# Patient Record
Sex: FEMALE | Race: WHITE | NOT HISPANIC OR LATINO | URBAN - METROPOLITAN AREA
[De-identification: names, ages, dates, MRNs, and addresses within clinical notes are randomized per-mention and may not be internally consistent; named-entity substitution may affect disease eponyms.]

---

## 2017-03-10 ENCOUNTER — EMERGENCY (EMERGENCY)
Facility: HOSPITAL | Age: 82
LOS: 1 days | Discharge: HOME | End: 2017-03-10
Attending: EMERGENCY MEDICINE | Admitting: EMERGENCY MEDICINE
Payer: MEDICARE

## 2017-03-10 VITALS
TEMPERATURE: 98 F | OXYGEN SATURATION: 97 % | RESPIRATION RATE: 20 BRPM | DIASTOLIC BLOOD PRESSURE: 68 MMHG | HEART RATE: 82 BPM | SYSTOLIC BLOOD PRESSURE: 150 MMHG

## 2017-03-10 VITALS
RESPIRATION RATE: 19 BRPM | SYSTOLIC BLOOD PRESSURE: 186 MMHG | HEART RATE: 76 BPM | OXYGEN SATURATION: 98 % | DIASTOLIC BLOOD PRESSURE: 73 MMHG

## 2017-03-10 DIAGNOSIS — M54.12 RADICULOPATHY, CERVICAL REGION: ICD-10-CM

## 2017-03-10 DIAGNOSIS — I10 ESSENTIAL (PRIMARY) HYPERTENSION: ICD-10-CM

## 2017-03-10 DIAGNOSIS — Z91.018 ALLERGY TO OTHER FOODS: ICD-10-CM

## 2017-03-10 DIAGNOSIS — Z88.1 ALLERGY STATUS TO OTHER ANTIBIOTIC AGENTS STATUS: ICD-10-CM

## 2017-03-10 DIAGNOSIS — Z88.0 ALLERGY STATUS TO PENICILLIN: ICD-10-CM

## 2017-03-10 DIAGNOSIS — R51 HEADACHE: ICD-10-CM

## 2017-03-10 LAB
ALBUMIN SERPL ELPH-MCNC: 4 G/DL — SIGNIFICANT CHANGE UP (ref 3.3–5)
ALP SERPL-CCNC: 81 U/L — SIGNIFICANT CHANGE UP (ref 40–120)
ALT FLD-CCNC: 17 U/L RC — SIGNIFICANT CHANGE UP (ref 10–45)
ANION GAP SERPL CALC-SCNC: 13 MMOL/L — SIGNIFICANT CHANGE UP (ref 5–17)
APTT BLD: 31.4 SEC — SIGNIFICANT CHANGE UP (ref 27.5–37.4)
AST SERPL-CCNC: 19 U/L — SIGNIFICANT CHANGE UP (ref 10–40)
BASOPHILS # BLD AUTO: 0 K/UL — SIGNIFICANT CHANGE UP (ref 0–0.2)
BASOPHILS NFR BLD AUTO: 0.8 % — SIGNIFICANT CHANGE UP (ref 0–2)
BILIRUB SERPL-MCNC: 0.3 MG/DL — SIGNIFICANT CHANGE UP (ref 0.2–1.2)
BUN SERPL-MCNC: 16 MG/DL — SIGNIFICANT CHANGE UP (ref 7–23)
CALCIUM SERPL-MCNC: 9.3 MG/DL — SIGNIFICANT CHANGE UP (ref 8.4–10.5)
CHLORIDE SERPL-SCNC: 104 MMOL/L — SIGNIFICANT CHANGE UP (ref 96–108)
CO2 SERPL-SCNC: 27 MMOL/L — SIGNIFICANT CHANGE UP (ref 22–31)
CREAT SERPL-MCNC: 0.72 MG/DL — SIGNIFICANT CHANGE UP (ref 0.5–1.3)
EOSINOPHIL # BLD AUTO: 0.4 K/UL — SIGNIFICANT CHANGE UP (ref 0–0.5)
EOSINOPHIL NFR BLD AUTO: 6.1 % — HIGH (ref 0–6)
GLUCOSE SERPL-MCNC: 104 MG/DL — HIGH (ref 70–99)
HCT VFR BLD CALC: 44.2 % — SIGNIFICANT CHANGE UP (ref 34.5–45)
HGB BLD-MCNC: 14.3 G/DL — SIGNIFICANT CHANGE UP (ref 11.5–15.5)
INR BLD: 1.03 RATIO — SIGNIFICANT CHANGE UP (ref 0.88–1.16)
LYMPHOCYTES # BLD AUTO: 1.5 K/UL — SIGNIFICANT CHANGE UP (ref 1–3.3)
LYMPHOCYTES # BLD AUTO: 23.7 % — SIGNIFICANT CHANGE UP (ref 13–44)
MCHC RBC-ENTMCNC: 30.7 PG — SIGNIFICANT CHANGE UP (ref 27–34)
MCHC RBC-ENTMCNC: 32.4 GM/DL — SIGNIFICANT CHANGE UP (ref 32–36)
MCV RBC AUTO: 94.9 FL — SIGNIFICANT CHANGE UP (ref 80–100)
MONOCYTES # BLD AUTO: 0.5 K/UL — SIGNIFICANT CHANGE UP (ref 0–0.9)
MONOCYTES NFR BLD AUTO: 8.2 % — SIGNIFICANT CHANGE UP (ref 2–14)
NEUTROPHILS # BLD AUTO: 3.8 K/UL — SIGNIFICANT CHANGE UP (ref 1.8–7.4)
NEUTROPHILS NFR BLD AUTO: 61.2 % — SIGNIFICANT CHANGE UP (ref 43–77)
PLATELET # BLD AUTO: 246 K/UL — SIGNIFICANT CHANGE UP (ref 150–400)
POTASSIUM SERPL-MCNC: 4.1 MMOL/L — SIGNIFICANT CHANGE UP (ref 3.5–5.3)
POTASSIUM SERPL-SCNC: 4.1 MMOL/L — SIGNIFICANT CHANGE UP (ref 3.5–5.3)
PROT SERPL-MCNC: 7.2 G/DL — SIGNIFICANT CHANGE UP (ref 6–8.3)
PROTHROM AB SERPL-ACNC: 11.2 SEC — SIGNIFICANT CHANGE UP (ref 10–13.1)
RBC # BLD: 4.66 M/UL — SIGNIFICANT CHANGE UP (ref 3.8–5.2)
RBC # FLD: 13.4 % — SIGNIFICANT CHANGE UP (ref 10.3–14.5)
SODIUM SERPL-SCNC: 144 MMOL/L — SIGNIFICANT CHANGE UP (ref 135–145)
WBC # BLD: 6.2 K/UL — SIGNIFICANT CHANGE UP (ref 3.8–10.5)
WBC # FLD AUTO: 6.2 K/UL — SIGNIFICANT CHANGE UP (ref 3.8–10.5)

## 2017-03-10 PROCEDURE — 83605 ASSAY OF LACTIC ACID: CPT

## 2017-03-10 PROCEDURE — 70450 CT HEAD/BRAIN W/O DYE: CPT | Mod: 26

## 2017-03-10 PROCEDURE — 99284 EMERGENCY DEPT VISIT MOD MDM: CPT | Mod: 25,GC

## 2017-03-10 PROCEDURE — 93005 ELECTROCARDIOGRAM TRACING: CPT

## 2017-03-10 PROCEDURE — 85730 THROMBOPLASTIN TIME PARTIAL: CPT

## 2017-03-10 PROCEDURE — 99284 EMERGENCY DEPT VISIT MOD MDM: CPT | Mod: 25

## 2017-03-10 PROCEDURE — 70450 CT HEAD/BRAIN W/O DYE: CPT

## 2017-03-10 PROCEDURE — 71010: CPT | Mod: 26

## 2017-03-10 PROCEDURE — 82330 ASSAY OF CALCIUM: CPT

## 2017-03-10 PROCEDURE — 72125 CT NECK SPINE W/O DYE: CPT

## 2017-03-10 PROCEDURE — 72125 CT NECK SPINE W/O DYE: CPT | Mod: 26

## 2017-03-10 PROCEDURE — 84295 ASSAY OF SERUM SODIUM: CPT

## 2017-03-10 PROCEDURE — 82803 BLOOD GASES ANY COMBINATION: CPT

## 2017-03-10 PROCEDURE — 85610 PROTHROMBIN TIME: CPT

## 2017-03-10 PROCEDURE — 96374 THER/PROPH/DIAG INJ IV PUSH: CPT

## 2017-03-10 PROCEDURE — 84132 ASSAY OF SERUM POTASSIUM: CPT

## 2017-03-10 PROCEDURE — 82947 ASSAY GLUCOSE BLOOD QUANT: CPT

## 2017-03-10 PROCEDURE — 85027 COMPLETE CBC AUTOMATED: CPT

## 2017-03-10 PROCEDURE — 80053 COMPREHEN METABOLIC PANEL: CPT

## 2017-03-10 PROCEDURE — 85014 HEMATOCRIT: CPT

## 2017-03-10 PROCEDURE — 93010 ELECTROCARDIOGRAM REPORT: CPT

## 2017-03-10 PROCEDURE — 82435 ASSAY OF BLOOD CHLORIDE: CPT

## 2017-03-10 PROCEDURE — 71045 X-RAY EXAM CHEST 1 VIEW: CPT

## 2017-03-10 RX ORDER — ACETAMINOPHEN 500 MG
1000 TABLET ORAL ONCE
Qty: 0 | Refills: 0 | Status: COMPLETED | OUTPATIENT
Start: 2017-03-10 | End: 2017-03-10

## 2017-03-10 RX ORDER — METOCLOPRAMIDE HCL 10 MG
10 TABLET ORAL ONCE
Qty: 0 | Refills: 0 | Status: COMPLETED | OUTPATIENT
Start: 2017-03-10 | End: 2017-03-10

## 2017-03-10 RX ADMIN — Medication 400 MILLIGRAM(S): at 12:22

## 2017-03-10 RX ADMIN — Medication 1000 MILLIGRAM(S): at 13:08

## 2017-03-10 NOTE — ED ADULT NURSE NOTE - OBJECTIVE STATEMENT
89 y/o female patient presents ambulatory to ED brought in by EMS from home with family complaining of headache. Patient states she woke up this morning at 0945am with left sided headache with radiation down the left neck/arm associated with numbness. Patient ambulatory this AM and strength equal bilaterally. Per patient she took 1 advil with minimal relief. Patient currently being treated for bronchitis with antibiotics from PMD. Patient denies SOB and CP, no N/V/D, afebrile in ED, no headache, no lightheadedness/dizziness, no weakness, no abdominal pain or tenderness, no syncope. Patient has hx of HTN.

## 2017-03-10 NOTE — ED PROVIDER NOTE - OBJECTIVE STATEMENT
89F hx HTN presents with posterior HA radiating down LUE beginning this morning.  As per sister who lives with patient, no facial droop withdrawal or other focal deficits noted.  Patient took ibuprofen at home with some improvement.  Has had similar pain previously and follows with chiropractor.  Denies chest pain, sob, fever/chills, visual changes.
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
## 2017-03-10 NOTE — ED PROVIDER NOTE - PLAN OF CARE
Use Tylenol 650mg and/or Motrin 600mg.  Follow-up with your primary doctor.  Follow-up with spinal surgery.  Return for any new/worsening symptoms or concerns.

## 2017-03-10 NOTE — ED PROVIDER NOTE - CARE PLAN
Principal Discharge DX:	Cervical radiculopathy  Instructions for follow-up, activity and diet:	Use Tylenol 650mg and/or Motrin 600mg.  Follow-up with your primary doctor.  Follow-up with spinal surgery.  Return for any new/worsening symptoms or concerns.

## 2017-03-10 NOTE — ED ADULT NURSE REASSESSMENT NOTE - NS ED NURSE REASSESS COMMENT FT1
MD Garcia & Anne made aware of patients elevated BP. Pt denies SOB/chest pain/headache/visual disturbances. Pt to take normal dose of BP meds at home tonight at scheduled time. Pt OK to be discharged as per MD.

## 2017-03-10 NOTE — ED PROVIDER NOTE - ATTENDING CONTRIBUTION TO CARE
I, Dr. Ankit Rodríguez, interviewed the patient and performed a physical exam and spoke to the resident about the plan of care for this patient.  Pt with left side posterior head/neck pain with numbness of L arm, improved with Aleve.  Exam: td paraspinal and trapezius L neck, NV intact, no motor weakness.

## 2018-01-16 ENCOUNTER — INPATIENT (INPATIENT)
Facility: HOSPITAL | Age: 83
LOS: 2 days | Discharge: HOME | DRG: 948 | End: 2018-01-19
Attending: INTERNAL MEDICINE | Admitting: INTERNAL MEDICINE
Payer: MEDICARE

## 2018-01-16 VITALS — RESPIRATION RATE: 18 BRPM | SYSTOLIC BLOOD PRESSURE: 174 MMHG | HEART RATE: 86 BPM | DIASTOLIC BLOOD PRESSURE: 92 MMHG

## 2018-01-16 DIAGNOSIS — Z29.9 ENCOUNTER FOR PROPHYLACTIC MEASURES, UNSPECIFIED: ICD-10-CM

## 2018-01-16 DIAGNOSIS — M25.551 PAIN IN RIGHT HIP: ICD-10-CM
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
## 2018-01-16 DIAGNOSIS — I10 ESSENTIAL (PRIMARY) HYPERTENSION: ICD-10-CM

## 2018-01-16 DIAGNOSIS — M10.9 GOUT, UNSPECIFIED: ICD-10-CM

## 2018-01-16 LAB
ALBUMIN SERPL ELPH-MCNC: 4.2 G/DL — SIGNIFICANT CHANGE UP (ref 3.3–5)
ALP SERPL-CCNC: 76 U/L — SIGNIFICANT CHANGE UP (ref 40–120)
ALT FLD-CCNC: 22 U/L RC — SIGNIFICANT CHANGE UP (ref 10–45)
ANION GAP SERPL CALC-SCNC: 12 MMOL/L — SIGNIFICANT CHANGE UP (ref 5–17)
AST SERPL-CCNC: 22 U/L — SIGNIFICANT CHANGE UP (ref 10–40)
BASOPHILS # BLD AUTO: 0 K/UL — SIGNIFICANT CHANGE UP (ref 0–0.2)
BASOPHILS NFR BLD AUTO: 0 % — SIGNIFICANT CHANGE UP (ref 0–2)
BILIRUB SERPL-MCNC: 0.3 MG/DL — SIGNIFICANT CHANGE UP (ref 0.2–1.2)
BUN SERPL-MCNC: 16 MG/DL — SIGNIFICANT CHANGE UP (ref 7–23)
CALCIUM SERPL-MCNC: 9.6 MG/DL — SIGNIFICANT CHANGE UP (ref 8.4–10.5)
CHLORIDE SERPL-SCNC: 104 MMOL/L — SIGNIFICANT CHANGE UP (ref 96–108)
CO2 SERPL-SCNC: 28 MMOL/L — SIGNIFICANT CHANGE UP (ref 22–31)
CREAT SERPL-MCNC: 0.7 MG/DL — SIGNIFICANT CHANGE UP (ref 0.5–1.3)
EOSINOPHIL # BLD AUTO: 0.2 K/UL — SIGNIFICANT CHANGE UP (ref 0–0.5)
EOSINOPHIL NFR BLD AUTO: 1.9 % — SIGNIFICANT CHANGE UP (ref 0–6)
GLUCOSE SERPL-MCNC: 98 MG/DL — SIGNIFICANT CHANGE UP (ref 70–99)
HCT VFR BLD CALC: 44 % — SIGNIFICANT CHANGE UP (ref 34.5–45)
HGB BLD-MCNC: 14.6 G/DL — SIGNIFICANT CHANGE UP (ref 11.5–15.5)
LYMPHOCYTES # BLD AUTO: 1.2 K/UL — SIGNIFICANT CHANGE UP (ref 1–3.3)
LYMPHOCYTES # BLD AUTO: 10.5 % — LOW (ref 13–44)
MAGNESIUM SERPL-MCNC: 2.2 MG/DL — SIGNIFICANT CHANGE UP (ref 1.6–2.6)
MCHC RBC-ENTMCNC: 32.4 PG — SIGNIFICANT CHANGE UP (ref 27–34)
MCHC RBC-ENTMCNC: 33.1 GM/DL — SIGNIFICANT CHANGE UP (ref 32–36)
MCV RBC AUTO: 97.8 FL — SIGNIFICANT CHANGE UP (ref 80–100)
MONOCYTES # BLD AUTO: 0.6 K/UL — SIGNIFICANT CHANGE UP (ref 0–0.9)
MONOCYTES NFR BLD AUTO: 5.4 % — SIGNIFICANT CHANGE UP (ref 2–14)
NEUTROPHILS # BLD AUTO: 9.8 K/UL — HIGH (ref 1.8–7.4)
NEUTROPHILS NFR BLD AUTO: 82.2 % — HIGH (ref 43–77)
PHOSPHATE SERPL-MCNC: 2.7 MG/DL — SIGNIFICANT CHANGE UP (ref 2.5–4.5)
PLATELET # BLD AUTO: 192 K/UL — SIGNIFICANT CHANGE UP (ref 150–400)
POTASSIUM SERPL-MCNC: 4.1 MMOL/L — SIGNIFICANT CHANGE UP (ref 3.5–5.3)
POTASSIUM SERPL-SCNC: 4.1 MMOL/L — SIGNIFICANT CHANGE UP (ref 3.5–5.3)
PROT SERPL-MCNC: 7.8 G/DL — SIGNIFICANT CHANGE UP (ref 6–8.3)
RBC # BLD: 4.5 M/UL — SIGNIFICANT CHANGE UP (ref 3.8–5.2)
RBC # FLD: 12.5 % — SIGNIFICANT CHANGE UP (ref 10.3–14.5)
SODIUM SERPL-SCNC: 144 MMOL/L — SIGNIFICANT CHANGE UP (ref 135–145)
WBC # BLD: 11.9 K/UL — HIGH (ref 3.8–10.5)
WBC # FLD AUTO: 11.9 K/UL — HIGH (ref 3.8–10.5)

## 2018-01-16 PROCEDURE — 72125 CT NECK SPINE W/O DYE: CPT | Mod: 26

## 2018-01-16 PROCEDURE — 72170 X-RAY EXAM OF PELVIS: CPT | Mod: 26

## 2018-01-16 PROCEDURE — 93010 ELECTROCARDIOGRAM REPORT: CPT

## 2018-01-16 PROCEDURE — 99285 EMERGENCY DEPT VISIT HI MDM: CPT | Mod: 25,GC

## 2018-01-16 PROCEDURE — 99223 1ST HOSP IP/OBS HIGH 75: CPT

## 2018-01-16 PROCEDURE — 70450 CT HEAD/BRAIN W/O DYE: CPT | Mod: 26

## 2018-01-16 PROCEDURE — 71250 CT THORAX DX C-: CPT | Mod: 26

## 2018-01-16 RX ORDER — ACETAMINOPHEN 500 MG
650 TABLET ORAL EVERY 6 HOURS
Qty: 0 | Refills: 0 | Status: DISCONTINUED | OUTPATIENT
Start: 2018-01-16 | End: 2018-01-19

## 2018-01-16 RX ORDER — AMLODIPINE BESYLATE 2.5 MG/1
5 TABLET ORAL DAILY
Qty: 0 | Refills: 0 | Status: DISCONTINUED | OUTPATIENT
Start: 2018-01-16 | End: 2018-01-17

## 2018-01-16 RX ORDER — ENOXAPARIN SODIUM 100 MG/ML
40 INJECTION SUBCUTANEOUS EVERY 24 HOURS
Qty: 0 | Refills: 0 | Status: DISCONTINUED | OUTPATIENT
Start: 2018-01-16 | End: 2018-01-19

## 2018-01-16 RX ORDER — SODIUM CHLORIDE 9 MG/ML
500 INJECTION INTRAMUSCULAR; INTRAVENOUS; SUBCUTANEOUS ONCE
Qty: 0 | Refills: 0 | Status: COMPLETED | OUTPATIENT
Start: 2018-01-16 | End: 2018-01-16

## 2018-01-16 RX ORDER — ACETAMINOPHEN 500 MG
1000 TABLET ORAL ONCE
Qty: 0 | Refills: 0 | Status: COMPLETED | OUTPATIENT
Start: 2018-01-16 | End: 2018-01-16

## 2018-01-16 RX ADMIN — Medication 400 MILLIGRAM(S): at 18:10

## 2018-01-16 RX ADMIN — Medication 1000 MILLIGRAM(S): at 20:30

## 2018-01-16 RX ADMIN — SODIUM CHLORIDE 500 MILLILITER(S): 9 INJECTION INTRAMUSCULAR; INTRAVENOUS; SUBCUTANEOUS at 18:10

## 2018-01-16 NOTE — H&P ADULT - PROBLEM SELECTOR PLAN 4
Reportedly diagnosed recently. Not on meds. Prefers natural medicine. Will limit ETOH and red meats.

## 2018-01-16 NOTE — ED PROVIDER NOTE - PROGRESS NOTE DETAILS
HUFFMAN: A+O x3, GCS 15, C collar , No C/T/L/S spne midine ttp. nonfocal C-spine cleared, c-collar taken off. Patient with too much pain to tolerate walking unassisted. C-spine cleared, c-collar taken off. Patient with too much pain to tolerate walking unassisted. Refusing any opiates. Will give warm compress and admit for PT and pain control.

## 2018-01-16 NOTE — ED PROVIDER NOTE - MEDICAL DECISION MAKING DETAILS
89yo F with slip and fall with b/l hip pain. Will get basic labs, CT head, neck, chest and XR pelvis. Tylenol for pain. 89 yo no PMH presents with slip and fall on stairs. Patient was at [employer] and was walking down stairs when she slipped and fell, landed on buttocks and her neck bent backwards. She denies hitting head, LOC,   Will get basic labs, CT head, neck, chest and XR pelvis. Tylenol for pain. Reassess

## 2018-01-16 NOTE — ED PROVIDER NOTE - NS ED ROS FT
REVIEW OF SYSTEMS:  CONSTITUTIONAL: No fever, weight loss, or fatigue  EYES: No eye pain, visual disturbances, or discharge  ENMT:  No difficulty hearing, tinnitus, vertigo; No sinus or throat pain  NECK: +cervical neck pain  BREASTS: No pain, masses, or nipple discharge  RESPIRATORY: No cough, wheezing, chills or hemoptysis; No shortness of breath  CARDIOVASCULAR: No chest pain, palpitations, dizziness, or leg swelling  GASTROINTESTINAL: No abdominal or epigastric pain. No nausea, vomiting, or hematemesis; No diarrhea or constipation. No melena or hematochezia.  GENITOURINARY: No dysuria, frequency, hematuria, or incontinence  NEUROLOGICAL: No headaches, memory loss, loss of strength, numbness, or tremors  SKIN: No itching, burning, rashes, or lesions   LYMPH NODES: No enlarged glands  ENDOCRINE: No heat or cold intolerance; No hair loss  MUSCULOSKELETAL: +b/l hip pain, R rib pain  PSYCHIATRIC: No depression, anxiety, mood swings, or difficulty sleeping  HEME/LYMPH: No easy bruising, or bleeding gums  ALLERY AND IMMUNOLOGIC: No hives or eczema

## 2018-01-16 NOTE — H&P ADULT - PROBLEM SELECTOR PLAN 2
Seems to be mechanical fall. Pt endorses one episode of syncope almost 10 yrs ago possibly related to hot weather in the summer. Pan imaging negative for fracture.  -F/u final read of imaging  -If pt endorses pain in new areas may need additional scans  -PT consult.

## 2018-01-16 NOTE — ED ADULT TRIAGE NOTE - CHIEF COMPLAINT QUOTE
Witnessed slip and fall this afternoon from sitting position. No head injury, no LOC. Patient c/o bilateral hip pain. NO AC. Patient at baseline mental status A&Ox3.

## 2018-01-16 NOTE — H&P ADULT - NSHPPHYSICALEXAM_GEN_ALL_CORE
Vital Signs Last 24 Hrs  T(C): 37.1 (01-16-18 @ 17:25), Max: 37.1 (01-16-18 @ 17:25)  T(F): 98.8 (01-16-18 @ 17:25), Max: 98.8 (01-16-18 @ 17:25)  HR: 89 (01-16-18 @ 17:30) (86 - 104)  BP: 182/90 (01-16-18 @ 17:30) (174/92 - 200/97)  BP(mean): --  RR: 17 (01-16-18 @ 17:30) (17 - 18)  SpO2: 95% (01-16-18 @ 17:30) (85% - 95%)

## 2018-01-16 NOTE — ED PROVIDER NOTE - ATTENDING CONTRIBUTION TO CARE
89 yo no PMH presents with slip and fall on stairs. Patient was at [employer] and was walking down stairs when she slipped and fell, landed on buttocks and her neck bent backwards. She denies hitting head, LOC,   Will get basic labs, CT head, neck, chest and XR pelvis. Tylenol for pain. Reassess

## 2018-01-16 NOTE — H&P ADULT - PROBLEM SELECTOR PLAN 1
R posterior hip pain was limiting ability to stand. Imaging negative for fractures. Overall, pain seems somewhat improved compared to presentation. minimally reproducible on palpation in R posterior hip. Less so in neck and R shoulder pain no longer present.  -Cont. tylenol PRN pain control  -PT consult  -Pt is amenable to [facility] but may be able to d/c home if closer to baseline tomorrow

## 2018-01-16 NOTE — H&P ADULT - NSHPSOCIALHISTORY_GEN_ALL_CORE
Lives with her sister. No ambulatory assist devices. Usually very active. Denies tobacco or ETOH Lives with her sister. No ambulatory assist devices. Usually very active. Denies tobacco or ETOH. No children, Friend and niece jointly act as HCPs

## 2018-01-16 NOTE — H&P ADULT - HISTORY OF PRESENT ILLNESS
89F w/ hx of HTN, CAD s/p BREANNA decades ago?, PAD, gout? p/w fall and persistent pain. Pt states she was in her usual state of health and was visiting her  today. While walking up the stairs at the house she reached out for the railing, missed the railing and fell backwards on her back down 3 steps. Pt states she hit her lower back and neck on the stairs. She denies chest pain, SOB, palpitations, LOC, dizziness. She laid on the floor waiting for the ambulance to come after 911 was called. While in ER patient attempted to stand and had severe pain in her back R hip, L shoulder and neck. The pain the hip limited her ability to remain standing.    In ER: Given tylenol 1gm, NS 500ccs

## 2018-01-16 NOTE — ED ADULT NURSE NOTE - OBJECTIVE STATEMENT
89 yo female with PMH HTN brought to ED by EMS s/p slip and fall. Patient was getting her hair done when she slipped and fell down two steps landing on her buttocks. Patient denies head injury and LOC. Now reporting low back pain and right sided neck pain. Patient stayed on ground for ~half an hour before EMS arrived. On arrival to ED, patient A&Ox4. C-collar in place. Respirations unlabored. Abdomen is soft, nondistended, nontender to palpation. Full ROM x4 extremities. +low back pain and tenderness to palpation. Skin intact. No rash. Family at bedside

## 2018-01-16 NOTE — ED PROVIDER NOTE - PHYSICAL EXAMINATION
PHYSICAL EXAM:  GENERAL: NAD, well-groomed, c-collar in place, comfortable  HEAD:  Atraumatic, Normocephalic  EYES: EOMI, PERRLA, conjunctiva and sclera clear  ENMT: No tonsillar erythema, exudates, or enlargement; Dry mucous membranes  NECK: Supple, No cervical spine point tenderness, no step-offs  CHEST/LUNG: Clear to percussion bilaterally; No rales, rhonchi, wheezing, or rubs  HEART: Regular rate and rhythm; No murmurs, rubs, or gallops  ABDOMEN: Soft, Nontender, Nondistended; Bowel sounds present  VASCULAR:  2+ Peripheral Pulses, No clubbing, cyanosis, or edema  LYMPH: No lymphadenopathy noted  SKIN: No rashes or lesions  NERVOUS SYSTEM:  AAOx3, moving all extremities, pain with passive flexion of hips bilaterally

## 2018-01-16 NOTE — ED PROVIDER NOTE - CARE PLAN
Principal Discharge DX:	Fall  Goal:	r/o fracture Principal Discharge DX:	Fall  Goal:	r/o fracture  Secondary Diagnosis:	Hip pain, acute, right

## 2018-01-16 NOTE — ED PROVIDER NOTE - OBJECTIVE STATEMENT
89 yo no PMH presents with slip and fall on stairs. Patient was at [facility] and was walking down stairs when she slipped and fell, landed on buttocks and her neck bent backwards. She denies hitting head, LOC, seizure. She laid still until EMS arrived. She reports R scapular pain along with b/l hip pain. She denies confusion, gait disturbance, headache, incontinence.
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
## 2018-01-17 LAB
ANION GAP SERPL CALC-SCNC: 15 MMOL/L — SIGNIFICANT CHANGE UP (ref 5–17)
APPEARANCE UR: CLEAR — SIGNIFICANT CHANGE UP
BASOPHILS # BLD AUTO: 0.03 K/UL — SIGNIFICANT CHANGE UP (ref 0–0.2)
BASOPHILS NFR BLD AUTO: 0.3 % — SIGNIFICANT CHANGE UP (ref 0–2)
BILIRUB UR-MCNC: NEGATIVE — SIGNIFICANT CHANGE UP
BUN SERPL-MCNC: 11 MG/DL — SIGNIFICANT CHANGE UP (ref 7–23)
CALCIUM SERPL-MCNC: 9.2 MG/DL — SIGNIFICANT CHANGE UP (ref 8.4–10.5)
CHLORIDE SERPL-SCNC: 103 MMOL/L — SIGNIFICANT CHANGE UP (ref 96–108)
CO2 SERPL-SCNC: 23 MMOL/L — SIGNIFICANT CHANGE UP (ref 22–31)
COLOR SPEC: YELLOW — SIGNIFICANT CHANGE UP
CREAT SERPL-MCNC: 0.6 MG/DL — SIGNIFICANT CHANGE UP (ref 0.5–1.3)
DIFF PNL FLD: NEGATIVE — SIGNIFICANT CHANGE UP
EOSINOPHIL # BLD AUTO: 0.41 K/UL — SIGNIFICANT CHANGE UP (ref 0–0.5)
EOSINOPHIL NFR BLD AUTO: 3.6 % — SIGNIFICANT CHANGE UP (ref 0–6)
GLUCOSE SERPL-MCNC: 113 MG/DL — HIGH (ref 70–99)
GLUCOSE UR QL: NEGATIVE — SIGNIFICANT CHANGE UP
HCT VFR BLD CALC: 42.2 % — SIGNIFICANT CHANGE UP (ref 34.5–45)
HGB BLD-MCNC: 13.7 G/DL — SIGNIFICANT CHANGE UP (ref 11.5–15.5)
IMM GRANULOCYTES NFR BLD AUTO: 0.2 % — SIGNIFICANT CHANGE UP (ref 0–1.5)
KETONES UR-MCNC: NEGATIVE — SIGNIFICANT CHANGE UP
LEUKOCYTE ESTERASE UR-ACNC: NEGATIVE — SIGNIFICANT CHANGE UP
LYMPHOCYTES # BLD AUTO: 1.43 K/UL — SIGNIFICANT CHANGE UP (ref 1–3.3)
LYMPHOCYTES # BLD AUTO: 12.7 % — LOW (ref 13–44)
MAGNESIUM SERPL-MCNC: 2.1 MG/DL — SIGNIFICANT CHANGE UP (ref 1.6–2.6)
MCHC RBC-ENTMCNC: 30.9 PG — SIGNIFICANT CHANGE UP (ref 27–34)
MCHC RBC-ENTMCNC: 32.5 GM/DL — SIGNIFICANT CHANGE UP (ref 32–36)
MCV RBC AUTO: 95 FL — SIGNIFICANT CHANGE UP (ref 80–100)
MONOCYTES # BLD AUTO: 0.82 K/UL — SIGNIFICANT CHANGE UP (ref 0–0.9)
MONOCYTES NFR BLD AUTO: 7.3 % — SIGNIFICANT CHANGE UP (ref 2–14)
NEUTROPHILS # BLD AUTO: 8.58 K/UL — HIGH (ref 1.8–7.4)
NEUTROPHILS NFR BLD AUTO: 75.9 % — SIGNIFICANT CHANGE UP (ref 43–77)
NITRITE UR-MCNC: NEGATIVE — SIGNIFICANT CHANGE UP
PH UR: 6.5 — SIGNIFICANT CHANGE UP (ref 5–8)
PLATELET # BLD AUTO: 201 K/UL — SIGNIFICANT CHANGE UP (ref 150–400)
POTASSIUM SERPL-MCNC: 4.1 MMOL/L — SIGNIFICANT CHANGE UP (ref 3.5–5.3)
POTASSIUM SERPL-SCNC: 4.1 MMOL/L — SIGNIFICANT CHANGE UP (ref 3.5–5.3)
PROT UR-MCNC: SIGNIFICANT CHANGE UP
RBC # BLD: 4.44 M/UL — SIGNIFICANT CHANGE UP (ref 3.8–5.2)
RBC # FLD: 14.3 % — SIGNIFICANT CHANGE UP (ref 10.3–14.5)
SODIUM SERPL-SCNC: 141 MMOL/L — SIGNIFICANT CHANGE UP (ref 135–145)
SP GR SPEC: 1.02 — SIGNIFICANT CHANGE UP (ref 1.01–1.02)
UROBILINOGEN FLD QL: NEGATIVE — SIGNIFICANT CHANGE UP
WBC # BLD: 11.29 K/UL — HIGH (ref 3.8–10.5)
WBC # FLD AUTO: 11.29 K/UL — HIGH (ref 3.8–10.5)

## 2018-01-17 PROCEDURE — 99232 SBSQ HOSP IP/OBS MODERATE 35: CPT

## 2018-01-17 RX ORDER — ACETAMINOPHEN 500 MG
650 TABLET ORAL ONCE
Qty: 0 | Refills: 0 | Status: COMPLETED | OUTPATIENT
Start: 2018-01-17 | End: 2018-01-17

## 2018-01-17 RX ORDER — AMLODIPINE BESYLATE 2.5 MG/1
5 TABLET ORAL ONCE
Qty: 0 | Refills: 0 | Status: COMPLETED | OUTPATIENT
Start: 2018-01-17 | End: 2018-01-17

## 2018-01-17 RX ORDER — SIMETHICONE 80 MG/1
80 TABLET, CHEWABLE ORAL THREE TIMES A DAY
Qty: 0 | Refills: 0 | Status: DISCONTINUED | OUTPATIENT
Start: 2018-01-17 | End: 2018-01-19

## 2018-01-17 RX ORDER — POLYETHYLENE GLYCOL 3350 17 G/17G
17 POWDER, FOR SOLUTION ORAL DAILY
Qty: 0 | Refills: 0 | Status: DISCONTINUED | OUTPATIENT
Start: 2018-01-17 | End: 2018-01-19

## 2018-01-17 RX ORDER — AMLODIPINE BESYLATE 2.5 MG/1
10 TABLET ORAL DAILY
Qty: 0 | Refills: 0 | Status: DISCONTINUED | OUTPATIENT
Start: 2018-01-18 | End: 2018-01-19

## 2018-01-17 RX ADMIN — Medication 650 MILLIGRAM(S): at 05:13

## 2018-01-17 RX ADMIN — Medication 650 MILLIGRAM(S): at 21:10

## 2018-01-17 RX ADMIN — Medication 650 MILLIGRAM(S): at 20:40

## 2018-01-17 RX ADMIN — Medication 650 MILLIGRAM(S): at 00:30

## 2018-01-17 RX ADMIN — POLYETHYLENE GLYCOL 3350 17 GRAM(S): 17 POWDER, FOR SOLUTION ORAL at 21:58

## 2018-01-17 RX ADMIN — ENOXAPARIN SODIUM 40 MILLIGRAM(S): 100 INJECTION SUBCUTANEOUS at 20:40

## 2018-01-17 RX ADMIN — AMLODIPINE BESYLATE 5 MILLIGRAM(S): 2.5 TABLET ORAL at 05:15

## 2018-01-17 RX ADMIN — AMLODIPINE BESYLATE 5 MILLIGRAM(S): 2.5 TABLET ORAL at 21:58

## 2018-01-17 RX ADMIN — Medication 650 MILLIGRAM(S): at 13:01

## 2018-01-17 NOTE — PROGRESS NOTE ADULT - PROBLEM SELECTOR PLAN 3
Reportedly diagnosed recently. Not on meds. Prefers natural medicine. Will limit alcohol and red meat intake.

## 2018-01-17 NOTE — PHYSICAL THERAPY INITIAL EVALUATION ADULT - ADDITIONAL COMMENTS
Pt lives w/ sister (care for sister) in a condo apt w/ 5 steps to enter. PTA indep w/o an assist device for all ADLs.

## 2018-01-17 NOTE — PROGRESS NOTE ADULT - SUBJECTIVE AND OBJECTIVE BOX
CC: Patient is a 89y old  Female who presents with a chief complaint of Pain after fall (2018 23:04)    SUBJECTIVE:  Resting in ED stretcher.  Feels neck and should pain improved.  Still with lateral lower back discomfort, particularly with sitting up and laying back.  Ambulated with PT and was able to bear weight.  No SOB.  No N/V.  NAD.     MEDICATIONS  (STANDING):  amLODIPine   Tablet 5 milliGRAM(s) Oral daily  enoxaparin Injectable 40 milliGRAM(s) SubCutaneous every 24 hours    MEDICATIONS  (PRN):  acetaminophen   Tablet. 650 milliGRAM(s) Oral every 6 hours PRN Moderate Pain (4 - 6)      Vital Signs Last 24 Hrs  T(C): 36.6 (2018 07:34), Max: 37.1 (2018 17:25)  T(F): 97.8 (2018 07:34), Max: 98.8 (2018 17:25)  HR: 71 (2018 12:25) (67 - 104)  BP: 161/70 (2018 12:25) (141/66 - 200/97)  BP(mean): --  RR: 17 (2018 07:34) (16 - 18)  SpO2: 95% (2018 07:34) (85% - 95%)    CAPILLARY BLOOD GLUCOSE        I&O's Summary      PHYSICAL EXAM:  GENERAL: Looks stated age, NAD  CARDIOVASCULAR: Normal S1, S2  PULMONARY: Lungs clear to auscultation bilaterally. No wheezes/rales/rhonchi  GI: Abdomen soft, Nontender, Nondistended; Bowel sounds present  MSK/Ext:  Trace leg edema bilaterally.  No calf tenderness bilaterally  PSYCH: Normal Affect.      LABS:                        13.7   11.29 )-----------( 201      ( 2018 07:34 )             42.2     -    141  |  103  |  11  ----------------------------<  113<H>  4.1   |  23  |  0.60    Ca    9.2      2018 07:24  Phos  2.7     01-16  Mg     2.1     -17    TPro  7.8  /  Alb  4.2  /  TBili  0.3  /  DBili  x   /  AST  22  /  ALT  22  /  AlkPhos  76  01-16          Urinalysis Basic - ( 2018 11:44 )    Color: Yellow / Appearance: Clear / S.024 / pH: x  Gluc: x / Ketone: Negative  / Bili: Negative / Urobili: Negative   Blood: x / Protein: Trace / Nitrite: Negative   Leuk Esterase: Negative / RBC: x / WBC x   Sq Epi: x / Non Sq Epi: x / Bacteria: x        RADIOLOGY & ADDITIONAL TESTS:    < from: Xray Pelvis AP only (18 @ 18:43) >  IMPRESSION:     There is no acute displaced fracture or dislocation. Soft tissues and   bowel gas pattern is unremarkable. There are productive changes at both   sacroiliac joints. Degenerative changes of the lower lumbar spine. Mild   axial hip joint space narrowing bilaterally. Generalized osteopenia   limiting detailed evaluation.    < end of copied text >  < from: CT Head No Cont (18 @ 19:41) >  IMPRESSION:     CT BRAIN:     No acute intracranial bleeding, mass effect, or shift. No calvarial   fracture.    Air-fluid levels in the right maxillary and left frontal sinuses,   correlate for the presence of acute sinusitis.    CT CERVICAL SPINE:     No fracture or subluxation. No prevertebral soft tissue swelling.   Degenerative changes.    < end of copied text >  < from: CT Chest No Cont (18 @ 19:43) >  IMPRESSION: No displaced rib fracture or pneumothorax.    < end of copied text >

## 2018-01-17 NOTE — PROVIDER CONTACT NOTE (OTHER) - ACTION/TREATMENT ORDERED:
Dr. Gurrola made aware. Will continue to monitor and follow up with orders.
Dr. Gurrola made aware. Will continue to monitor and follow up with orders.
5 of amlodipine given as ordered and 650 of tylenol given as ordered. repeat BP in a hour. no further action at this time. will continue to monitor.

## 2018-01-17 NOTE — PROVIDER CONTACT NOTE (OTHER) - SITUATION
/79 /79  Patient softly distended; bowel sounds present. Patient states, "My last bowel movement was yesterday but I feel full"

## 2018-01-17 NOTE — PROGRESS NOTE ADULT - PROBLEM SELECTOR PLAN 1
Mechanical fall.  Imaging negative for fracture.  Able to ambulate with PT with minimum assist.  Continue Tylenol PRN for pain.  PT recommending rehab.

## 2018-01-17 NOTE — PHYSICAL THERAPY INITIAL EVALUATION ADULT - PERTINENT HX OF CURRENT PROBLEM, REHAB EVAL
89F w/ hx of HTN, CAD s/p BREANNA decades ago?, PAD, gout? p/w fall and persistent pain. CTH, CT C/S (-); Xray Pelvis (-).

## 2018-01-17 NOTE — ED ADULT NURSE REASSESSMENT NOTE - NS ED NURSE REASSESS COMMENT FT1
1915 - report taken from [facility]. Pt. A&Ox4. Pt. in c- collar for neck pain and reports lower back pain s/p mechanical fall down 2 steps. pt. denies LOC/hitting head. Pt. not on blood thinners. Pt. able to move all extremities. No numbness/tingling. Skin warm, dry and intact. Pt. reports pain at a scale of 7/10 but just received IV Tylenol. MD aware. Family at bedside. Safety and comfort provided.

## 2018-01-18 PROCEDURE — 99233 SBSQ HOSP IP/OBS HIGH 50: CPT | Mod: GC

## 2018-01-18 PROCEDURE — 72131 CT LUMBAR SPINE W/O DYE: CPT | Mod: 26

## 2018-01-18 RX ORDER — MORPHINE SULFATE 50 MG/1
2 CAPSULE, EXTENDED RELEASE ORAL ONCE
Qty: 0 | Refills: 0 | Status: DISCONTINUED | OUTPATIENT
Start: 2018-01-18 | End: 2018-01-18

## 2018-01-18 RX ORDER — LIDOCAINE 4 G/100G
1 CREAM TOPICAL DAILY
Qty: 0 | Refills: 0 | Status: DISCONTINUED | OUTPATIENT
Start: 2018-01-18 | End: 2018-01-19

## 2018-01-18 RX ADMIN — Medication 650 MILLIGRAM(S): at 22:35

## 2018-01-18 RX ADMIN — Medication 650 MILLIGRAM(S): at 11:03

## 2018-01-18 RX ADMIN — Medication 650 MILLIGRAM(S): at 23:00

## 2018-01-18 RX ADMIN — Medication 650 MILLIGRAM(S): at 11:33

## 2018-01-18 RX ADMIN — LIDOCAINE 1 PATCH: 4 CREAM TOPICAL at 16:49

## 2018-01-18 RX ADMIN — Medication 650 MILLIGRAM(S): at 06:30

## 2018-01-18 RX ADMIN — ENOXAPARIN SODIUM 40 MILLIGRAM(S): 100 INJECTION SUBCUTANEOUS at 19:59

## 2018-01-18 RX ADMIN — AMLODIPINE BESYLATE 10 MILLIGRAM(S): 2.5 TABLET ORAL at 05:57

## 2018-01-18 RX ADMIN — Medication 650 MILLIGRAM(S): at 05:57

## 2018-01-18 NOTE — PROGRESS NOTE ADULT - PROBLEM SELECTOR PLAN 2
With elevated BP on amlodipine 10 mg   - likely with component of pain and anxiety  - c/w amlodipine 10 mg for now  - will add addl anti-HTN agent if persistently elevated despite adequate pain control

## 2018-01-18 NOTE — PROGRESS NOTE ADULT - ASSESSMENT
89 PMH of HTN, CAD s/p  BREANNA (remote hx), PAD, and gout, who p/w mechanical fall, pending placement for TIFFANIE.

## 2018-01-18 NOTE — PROGRESS NOTE ADULT - PROBLEM SELECTOR PLAN 1
Patient with mechanical fall on stairs, with trauma to neck and lower back. Previously ambulatory without assist devices at baseline. No apparent other cardiac, cerebral, or neurologic etiology. CT head, c-spine, chest, and lumbar spine negative for fracture or pathology.   - evaluated by PT, recommending rehabilitation   - c/w pain management w/ lidoderm patch, tylenol  - awaiting placement for TIFFANIE  - ambulate w/ assistance  - avoid sedating agents

## 2018-01-18 NOTE — PROGRESS NOTE ADULT - SUBJECTIVE AND OBJECTIVE BOX
Jose Denis, PGY1  Pager: [phone]  After 7: Night Float pager  Alternate contact:     Patient is a 89y old  Female who presents with a chief complaint of Pain after fall (2018 23:04)      SUBJECTIVE / OVERNIGHT EVENTS:  Patient with poor sleep overnight due to noise/ disturbances. Otherwise, endorsing pain in left aspect of neck and lower back pain- 6/10. Denying fevers, chills, chest pain, dyspnea, or abdominal pain.       MEDICATIONS  (STANDING):  amLODIPine   Tablet 10 milliGRAM(s) Oral daily  enoxaparin Injectable 40 milliGRAM(s) SubCutaneous every 24 hours    MEDICATIONS  (PRN):  acetaminophen   Tablet. 650 milliGRAM(s) Oral every 6 hours PRN Moderate Pain (4 - 6)  polyethylene glycol 3350 17 Gram(s) Oral daily PRN Constipation  simethicone 80 milliGRAM(s) Chew three times a day PRN Gas      Vital Signs Last 24 Hrs  T(C): 36.7 (2018 13:37), Max: 36.8 (2018 04:54)  T(F): 98 (2018 13:37), Max: 98.3 (2018 04:54)  HR: 73 (2018 13:37) (73 - 81)  BP: 154/76 (2018 13:37) (154/76 - 192/79)  BP(mean): --  RR: 18 (2018 13:37) (16 - 18)  SpO2: 91% (2018 13:37) (89% - 94%)  CAPILLARY BLOOD GLUCOSE        I&O's Summary    2018 07:  -  2018 07:00  --------------------------------------------------------  IN: 480 mL / OUT: 700 mL / NET: -220 mL    2018 07:01  -  2018 15:36  --------------------------------------------------------  IN: 0 mL / OUT: 300 mL / NET: -300 mL        PHYSICAL EXAM:  GENERAL: NAD, resting in bed.   HEENT: NCAT. cervical paravertebral tenderness  EYES: PERRL, conjunctiva and sclera clear, no photophobia  NECK: No JVD  CHEST/LUNG: Clear to auscultation bilaterally; No wheeze  HEART: Regular rate and rhythm; No murmurs   ABDOMEN: Soft, Nontender, Nondistended; Bowel sounds present  EXTREMITIES:  2+ Peripheral Pulses. Mild discoloration in bilateral lower extremities. Tenderness overlying shins.   PSYCH: AAOx2-3  NEUROLOGY: 3/5 strength in upper and lower extremities. Sensation in tact throughout.   SKIN: No rashes or lesions    LABS:                        13.7   11.29 )-----------( 201      ( 2018 07:34 )             42.2         141  |  103  |  11  ----------------------------<  113<H>  4.1   |  23  |  0.60    Ca    9.2      2018 07:24  Phos  2.7       Mg     2.1         TPro  7.8  /  Alb  4.2  /  TBili  0.3  /  DBili  x   /  AST  22  /  ALT  22  /  AlkPhos  76            Urinalysis Basic - ( 2018 11:44 )    Color: Yellow / Appearance: Clear / S.024 / pH: x  Gluc: x / Ketone: Negative  / Bili: Negative / Urobili: Negative   Blood: x / Protein: Trace / Nitrite: Negative   Leuk Esterase: Negative / RBC: x / WBC x   Sq Epi: x / Non Sq Epi: x / Bacteria: x        RADIOLOGY & ADDITIONAL TESTS:    Imaging Personally Reviewed:    Consultant(s) Notes Reviewed:      Care Discussed with Consultants/Other Providers: Jose Denis, PGY1  Pager: [phone]  After 7: Night Float pager  Alternate contact:     Patient is a 89y old  Female who presents with a chief complaint of Pain after fall (2018 23:04)      SUBJECTIVE / OVERNIGHT EVENTS:  Patient with poor sleep overnight due to noise/ disturbances. Otherwise, endorsing pain in left aspect of neck and lower back pain- 6/10. Denying fevers, chills, chest pain, dyspnea, or abdominal pain.       MEDICATIONS  (STANDING):  amLODIPine   Tablet 10 milliGRAM(s) Oral daily  enoxaparin Injectable 40 milliGRAM(s) SubCutaneous every 24 hours    MEDICATIONS  (PRN):  acetaminophen   Tablet. 650 milliGRAM(s) Oral every 6 hours PRN Moderate Pain (4 - 6)  polyethylene glycol 3350 17 Gram(s) Oral daily PRN Constipation  simethicone 80 milliGRAM(s) Chew three times a day PRN Gas      Vital Signs Last 24 Hrs  T(C): 36.7 (2018 13:37), Max: 36.8 (2018 04:54)  T(F): 98 (2018 13:37), Max: 98.3 (2018 04:54)  HR: 73 (2018 13:37) (73 - 81)  BP: 154/76 (2018 13:37) (154/76 - 192/79)  BP(mean): --  RR: 18 (2018 13:37) (16 - 18)  SpO2: 91% (2018 13:37) (89% - 94%)  CAPILLARY BLOOD GLUCOSE        I&O's Summary    2018 07:  -  2018 07:00  --------------------------------------------------------  IN: 480 mL / OUT: 700 mL / NET: -220 mL    2018 07:01  -  2018 15:36  --------------------------------------------------------  IN: 0 mL / OUT: 300 mL / NET: -300 mL        PHYSICAL EXAM:  GENERAL: NAD, resting in bed.   HEENT: NCAT. cervical paravertebral tenderness  EYES: PERRL, conjunctiva and sclera clear, no photophobia  NECK: No JVD  CHEST/LUNG: Clear to auscultation bilaterally; No wheeze  HEART: Regular rate and rhythm; No murmurs   ABDOMEN: Soft, Nontender, Nondistended; Bowel sounds present  EXTREMITIES:  2+ Peripheral Pulses. Mild discoloration in bilateral lower extremities. Tenderness overlying shins.  4/5 muscle strenghtr b/l hip flexors.   PSYCH: AAOx2-3  NEUROLOGY: 4/5 strength in upper and lower extremities. Sensation in tact throughout.   SKIN: No rashes or lesions  MSK: left trapezious hypertonicity and TTP.  L5 spinal tenderness to palpation.      LABS:                        13.7   11.29 )-----------( 201      ( 2018 07:34 )             42.2         141  |  103  |  11  ----------------------------<  113<H>  4.1   |  23  |  0.60    Ca    9.2      2018 07:24  Phos  2.7       Mg     2.1         TPro  7.8  /  Alb  4.2  /  TBili  0.3  /  DBili  x   /  AST  22  /  ALT  22  /  AlkPhos  76  -          Urinalysis Basic - ( 2018 11:44 )    Color: Yellow / Appearance: Clear / S.024 / pH: x  Gluc: x / Ketone: Negative  / Bili: Negative / Urobili: Negative   Blood: x / Protein: Trace / Nitrite: Negative   Leuk Esterase: Negative / RBC: x / WBC x   Sq Epi: x / Non Sq Epi: x / Bacteria: x        RADIOLOGY & ADDITIONAL TESTS:    Imaging Personally Reviewed:    Consultant(s) Notes Reviewed:      Care Discussed with Consultants/Other Providers:

## 2018-01-19 ENCOUNTER — TRANSCRIPTION ENCOUNTER (OUTPATIENT)
Age: 83
End: 2018-01-19

## 2018-01-19 VITALS
HEART RATE: 63 BPM | OXYGEN SATURATION: 96 % | TEMPERATURE: 98 F | RESPIRATION RATE: 18 BRPM | DIASTOLIC BLOOD PRESSURE: 86 MMHG | SYSTOLIC BLOOD PRESSURE: 174 MMHG

## 2018-01-19 PROCEDURE — 93005 ELECTROCARDIOGRAM TRACING: CPT

## 2018-01-19 PROCEDURE — 85027 COMPLETE CBC AUTOMATED: CPT

## 2018-01-19 PROCEDURE — 99285 EMERGENCY DEPT VISIT HI MDM: CPT | Mod: 25

## 2018-01-19 PROCEDURE — 84100 ASSAY OF PHOSPHORUS: CPT

## 2018-01-19 PROCEDURE — 99239 HOSP IP/OBS DSCHRG MGMT >30: CPT

## 2018-01-19 PROCEDURE — 83735 ASSAY OF MAGNESIUM: CPT

## 2018-01-19 PROCEDURE — 72170 X-RAY EXAM OF PELVIS: CPT

## 2018-01-19 PROCEDURE — 81003 URINALYSIS AUTO W/O SCOPE: CPT

## 2018-01-19 PROCEDURE — 72131 CT LUMBAR SPINE W/O DYE: CPT

## 2018-01-19 PROCEDURE — 80048 BASIC METABOLIC PNL TOTAL CA: CPT

## 2018-01-19 PROCEDURE — 96374 THER/PROPH/DIAG INJ IV PUSH: CPT

## 2018-01-19 PROCEDURE — 80053 COMPREHEN METABOLIC PANEL: CPT

## 2018-01-19 PROCEDURE — 97162 PT EVAL MOD COMPLEX 30 MIN: CPT

## 2018-01-19 PROCEDURE — 72125 CT NECK SPINE W/O DYE: CPT

## 2018-01-19 PROCEDURE — 71250 CT THORAX DX C-: CPT

## 2018-01-19 PROCEDURE — 70450 CT HEAD/BRAIN W/O DYE: CPT

## 2018-01-19 RX ORDER — AMLODIPINE BESYLATE 2.5 MG/1
1 TABLET ORAL
Qty: 0 | Refills: 0 | COMMUNITY

## 2018-01-19 RX ORDER — AMLODIPINE BESYLATE 2.5 MG/1
1 TABLET ORAL
Qty: 0 | Refills: 0 | COMMUNITY
Start: 2018-01-19

## 2018-01-19 RX ORDER — DOCUSATE SODIUM 100 MG
1 CAPSULE ORAL
Qty: 1 | Refills: 0 | OUTPATIENT
Start: 2018-01-19

## 2018-01-19 RX ORDER — POLYETHYLENE GLYCOL 3350 17 G/17G
17 POWDER, FOR SOLUTION ORAL
Qty: 0 | Refills: 0 | COMMUNITY
Start: 2018-01-19

## 2018-01-19 RX ORDER — SENNA PLUS 8.6 MG/1
2 TABLET ORAL
Qty: 0 | Refills: 0 | COMMUNITY
Start: 2018-01-19

## 2018-01-19 RX ORDER — ACETAMINOPHEN 500 MG
2 TABLET ORAL
Qty: 0 | Refills: 0 | COMMUNITY
Start: 2018-01-19

## 2018-01-19 RX ORDER — AMLODIPINE BESYLATE 2.5 MG/1
1 TABLET ORAL
Qty: 0 | Refills: 0 | DISCHARGE
Start: 2018-01-19

## 2018-01-19 RX ORDER — IPRATROPIUM BROMIDE 21 MCG
2 AEROSOL, SPRAY (ML) NASAL
Qty: 0 | Refills: 0 | COMMUNITY

## 2018-01-19 RX ORDER — DOCUSATE SODIUM 100 MG
100 CAPSULE ORAL DAILY
Qty: 0 | Refills: 0 | Status: DISCONTINUED | OUTPATIENT
Start: 2018-01-19 | End: 2018-01-19

## 2018-01-19 RX ORDER — DOCUSATE SODIUM 100 MG
1 CAPSULE ORAL
Qty: 0 | Refills: 0 | COMMUNITY
Start: 2018-01-19

## 2018-01-19 RX ORDER — SENNA PLUS 8.6 MG/1
2 TABLET ORAL AT BEDTIME
Qty: 0 | Refills: 0 | Status: DISCONTINUED | OUTPATIENT
Start: 2018-01-19 | End: 2018-01-19

## 2018-01-19 RX ORDER — SIMETHICONE 80 MG/1
1 TABLET, CHEWABLE ORAL
Qty: 0 | Refills: 0 | COMMUNITY
Start: 2018-01-19

## 2018-01-19 RX ADMIN — LIDOCAINE 1 PATCH: 4 CREAM TOPICAL at 05:00

## 2018-01-19 RX ADMIN — Medication 100 MILLIGRAM(S): at 11:19

## 2018-01-19 RX ADMIN — LIDOCAINE 1 PATCH: 4 CREAM TOPICAL at 11:19

## 2018-01-19 RX ADMIN — Medication 650 MILLIGRAM(S): at 11:26

## 2018-01-19 RX ADMIN — AMLODIPINE BESYLATE 10 MILLIGRAM(S): 2.5 TABLET ORAL at 06:16

## 2018-01-19 NOTE — PROGRESS NOTE ADULT - ATTENDING COMMENTS
Agree with above.  No fracture on CT.  Pain improved with lidocaine patch. D/c today 43 minutes.  Bowel regimen to ensure BM
Agree with above.  Mechanical fall,a ble to walk with PT.  Plan for rehab.   CT ordered for L5 spinal tenderness, negative.  D/C planning.  Leukocytosis likely reactive to fall .  Avoid opioids as prior psychosis with it per friend at bedside.

## 2018-01-19 NOTE — DISCHARGE NOTE ADULT - PLAN OF CARE
Prevention You came to the hospital after a fall. On exam, you were noted to have significant weakness. No underlying reason for your fall such as infection, heart or neurologic was found. You were recommended to go to subacute rehabilitation to improve balance training. Please avoid sedating agents to prevent inciting future falls. Maintenance of normal blood pressure You blood pressures were noted to be elevated. Please continue to amlodipine 10 mg daily. Patient has not had bowel movement during hospital admission. Started on miralax and senna. If no bowel movement in 2 day, please uptitrate colace until bowel movement achieved. Patient has not had bowel movement during hospital admission. Started on miralax and senna. If no bowel movement in 2 day, please uptitrate bowel regimen until bowel movement achieved.

## 2018-01-19 NOTE — DISCHARGE NOTE ADULT - CARE PLAN
Principal Discharge DX:	Fall  Goal:	Prevention  Assessment and plan of treatment:	You came to the hospital after a fall. On exam, you were noted to have significant weakness. No underlying reason for your fall such as infection, heart or neurologic was found. You were recommended to go to subacute rehabilitation to improve balance training. Please avoid sedating agents to prevent inciting future falls.  Secondary Diagnosis:	Hypertension  Goal:	Maintenance of normal blood pressure  Assessment and plan of treatment:	You blood pressures were noted to be elevated. Please continue to amlodipine 10 mg daily. Principal Discharge DX:	Fall  Goal:	Prevention  Assessment and plan of treatment:	You came to the hospital after a fall. On exam, you were noted to have significant weakness. No underlying reason for your fall such as infection, heart or neurologic was found. You were recommended to go to subacute rehabilitation to improve balance training. Please avoid sedating agents to prevent inciting future falls.  Secondary Diagnosis:	Hypertension  Goal:	Maintenance of normal blood pressure  Assessment and plan of treatment:	You blood pressures were noted to be elevated. Please continue to amlodipine 10 mg daily.  Secondary Diagnosis:	Constipation  Assessment and plan of treatment:	Patient has not had bowel movement during hospital admission. Started on miralax and senna. If no bowel movement in 2 day, please uptitrate colace until bowel movement achieved. Principal Discharge DX:	Fall  Goal:	Prevention  Assessment and plan of treatment:	You came to the hospital after a fall. On exam, you were noted to have significant weakness. No underlying reason for your fall such as infection, heart or neurologic was found. You were recommended to go to subacute rehabilitation to improve balance training. Please avoid sedating agents to prevent inciting future falls.  Secondary Diagnosis:	Hypertension  Goal:	Maintenance of normal blood pressure  Assessment and plan of treatment:	You blood pressures were noted to be elevated. Please continue to amlodipine 10 mg daily.  Secondary Diagnosis:	Constipation  Assessment and plan of treatment:	Patient has not had bowel movement during hospital admission. Started on miralax and senna. If no bowel movement in 2 day, please uptitrate bowel regimen until bowel movement achieved.

## 2018-01-19 NOTE — PROGRESS NOTE ADULT - PROBLEM SELECTOR PLAN 1
Patient with mechanical fall on stairs, with trauma to neck and lower back. Previously ambulatory without assist devices at baseline. No apparent other cardiac, cerebral, or neurologic etiology. CT head, c-spine, chest, and lumbar spine negative for fracture or pathology. CT lumbar neg for vertebral fracture  - evaluated by PT, recommending rehabilitation   - c/w pain management w/ lidoderm patch, tylenol  - avoid opioids as patient intolerant  - awaiting placement for TIFFANIE  - ambulate w/ assistance  - avoid sedating agents  - c/w incentive spirometry

## 2018-01-19 NOTE — DISCHARGE NOTE ADULT - HOSPITAL COURSE
89F w/ hx of HTN, CAD s/p BREANNA decades ago?, PAD, gout? p/w fall and persistent pain. Pt states she was in her usual state of health and was visiting her  today. While walking up the stairs at the house she reached out for the railing, missed the railing and fell backwards on her back down 3 steps. Pt states she hit her lower back and neck on the stairs. She denies chest pain, SOB, palpitations, LOC, dizziness. She laid on the floor waiting for the ambulance to come after 911 was called. While in ER patient attempted to stand and had severe pain in her back R hip, L shoulder and neck. The pain the hip limited her ability to remain standing. 89 F PMH of HTN, CAD s/p BREANNA (remote), PAD, and gout who p/w persistent pain and inability ambulate after mechanical fall. Per patient, she fell backwards after missing the rail while going up stairs.  She endorses trauma to her lower back and neck. She denies any precipitating chest pain, SOB, palpitations, LOC, or dizziness. In ER, patient with stable VS, no over trauma noted on exam. However, she was noted to have significant weakness in b/l upper and lower extremities. Lab work and EKG not suggestive of cardiac, cerebral, or neurologic etiology. CT head, c-spine, chest, and lumbar spine negative for fracture or hemorrhage. Patient's pain was well managed with Tylenol and lidoderm patch; she declined opioids given intolerance in past. Patient previously ambulatory without assist devices at baseline, lived with sister. She was evaluated by PT, recommending rehabilitation. Patient medically stable for discharge with outpatient follow up.

## 2018-01-19 NOTE — DISCHARGE NOTE ADULT - CARE PROVIDER_API CALL
Esequiel [place]apoka  Internal Medicine  [address] [place] [address]  Phone: [phone]  Fax: (       -

## 2018-01-19 NOTE — PROGRESS NOTE ADULT - SUBJECTIVE AND OBJECTIVE BOX
Jose Denis, PGY1  Pager: [phone]  After 7: Night Float pager  Alternate contact:     Patient is a 89y old  Female who presents with a chief complaint of Pain after fall (2018 23:04)      SUBJECTIVE / OVERNIGHT EVENTS:  No acute events overnight. Pain overall improving. Denies fever, chest pain, abdominal pain.       MEDICATIONS  (STANDING):  amLODIPine   Tablet 10 milliGRAM(s) Oral daily  enoxaparin Injectable 40 milliGRAM(s) SubCutaneous every 24 hours  lidocaine   Patch 1 Patch Transdermal daily    MEDICATIONS  (PRN):  acetaminophen   Tablet. 650 milliGRAM(s) Oral every 6 hours PRN Moderate Pain (4 - 6)  polyethylene glycol 3350 17 Gram(s) Oral daily PRN Constipation  simethicone 80 milliGRAM(s) Chew three times a day PRN Gas      Vital Signs Last 24 Hrs  T(C): 36.4 (2018 05:51), Max: 36.7 (2018 13:37)  T(F): 97.5 (2018 05:51), Max: 98 (2018 13:37)  HR: 71 (2018 05:51) (71 - 83)  BP: 169/74 (2018 05:51) (154/76 - 169/74)  BP(mean): --  RR: 18 (2018 05:51) (17 - 18)  SpO2: 93% (2018 05:51) (89% - 93%)  CAPILLARY BLOOD GLUCOSE        I&O's Summary    2018 07:01  -  2018 07:00  --------------------------------------------------------  IN: 480 mL / OUT: 300 mL / NET: 180 mL        PHYSICAL EXAM:  GENERAL: NAD, resting in bed.   HEENT: NCAT. cervical paravertebral tenderness  EYES: PERRL, conjunctiva and sclera clear, no photophobia  NECK: No JVD  CHEST/LUNG: Clear to auscultation bilaterally; No wheeze  HEART: Regular rate and rhythm; No murmurs   ABDOMEN: Soft, Nontender, Nondistended; Bowel sounds present  EXTREMITIES:  2+ Peripheral Pulses. Mild discoloration in bilateral lower extremities. Tenderness overlying shins.    PSYCH: AAOx2-3  NEUROLOGY: 4/5 strength in upper and lower extremities. Sensation in tact throughout.   SKIN: No rashes or lesions  MSK: left trapezius hypertonicity and TTP.  Mild L5 spinal tenderness to palpation.        LABS:                        13.7   11.29 )-----------( 201      ( 2018 07:34 )             42.2                 Urinalysis Basic - ( 2018 11:44 )    Color: Yellow / Appearance: Clear / S.024 / pH: x  Gluc: x / Ketone: Negative  / Bili: Negative / Urobili: Negative   Blood: x / Protein: Trace / Nitrite: Negative   Leuk Esterase: Negative / RBC: x / WBC x   Sq Epi: x / Non Sq Epi: x / Bacteria: x        RADIOLOGY & ADDITIONAL TESTS:    Imaging Personally Reviewed:    < from: CT Lumbar Spine No Cont (18 @ 12:07) >  There is generalized.. Mild Schmorl's nodes are identified at level. No   acute fractures patient's are identified. There is a grade 1   anterolisthesis of L4 on L5. Degenerative facet changes are identified at   L4-5. Sclerosis within the S1 vertebral body is stable since the prior   exam.    < end of copied text >      Consultant(s) Notes Reviewed:      Care Discussed with Consultants/Other Providers:

## 2018-01-19 NOTE — DISCHARGE NOTE ADULT - MEDICATION SUMMARY - MEDICATIONS TO TAKE
I will START or STAY ON the medications listed below when I get home from the hospital:    acetaminophen 325 mg oral tablet  -- 2 tab(s) by mouth every 6 hours, As needed, for pain  -- Indication: For Pain    amLODIPine 10 mg oral tablet  -- 1 tab(s) by mouth once a day  -- Indication: For Hypertension    docusate sodium 100 mg oral capsule  -- 1 cap(s) by mouth once a day  -- Indication: For constipation    polyethylene glycol 3350 oral powder for reconstitution  -- 17 gram(s) by mouth once a day, As needed, Constipation  -- Indication: For constipation    senna oral tablet  -- 2 tab(s) by mouth once a day (at bedtime)  -- Indication: For constipation    simethicone 80 mg oral tablet, chewable  -- 1 tab(s) by mouth 3 times a day, As needed, Gas  -- Indication: For Gas distention

## 2018-01-19 NOTE — DISCHARGE NOTE ADULT - MEDICATION SUMMARY - MEDICATIONS TO STOP TAKING
I will STOP taking the medications listed below when I get home from the hospital:    Colace 50 mg oral capsule  -- 1 cap(s) by mouth 2 times a day as needed for constipation  -- Medication should be taken with plenty of water.

## 2018-01-19 NOTE — DISCHARGE NOTE ADULT - PATIENT PORTAL LINK FT
“You can access the Follow[employer] Patient Portal, offered by [facility], by registering with the following website: [email or web address]”

## 2018-08-29 ENCOUNTER — INPATIENT (INPATIENT)
Facility: HOSPITAL | Age: 83
LOS: 7 days | Discharge: NURSING, REHAB OR LONG-TERM CARE | DRG: 391 | End: 2018-09-06
Attending: HOSPITALIST | Admitting: STUDENT IN AN ORGANIZED HEALTH CARE EDUCATION/TRAINING PROGRAM
Payer: MEDICARE

## 2018-08-29 VITALS — WEIGHT: 106.92 LBS | HEIGHT: 64 IN

## 2018-08-29 DIAGNOSIS — R62.7 ADULT FAILURE TO THRIVE: ICD-10-CM

## 2018-08-29 DIAGNOSIS — R13.10 DYSPHAGIA, UNSPECIFIED: ICD-10-CM

## 2018-08-29 DIAGNOSIS — Z71.89 OTHER SPECIFIED COUNSELING: ICD-10-CM

## 2018-08-29 DIAGNOSIS — Z29.9 ENCOUNTER FOR PROPHYLACTIC MEASURES, UNSPECIFIED: ICD-10-CM

## 2018-08-29 DIAGNOSIS — I10 ESSENTIAL (PRIMARY) HYPERTENSION: ICD-10-CM

## 2018-08-29 DIAGNOSIS — J38.1 POLYP OF VOCAL CORD AND LARYNX: Chronic | ICD-10-CM

## 2018-08-29 LAB
ALBUMIN SERPL ELPH-MCNC: 4 G/DL — SIGNIFICANT CHANGE UP (ref 3.3–5)
ALP SERPL-CCNC: 66 U/L — SIGNIFICANT CHANGE UP (ref 40–120)
ALT FLD-CCNC: 11 U/L — SIGNIFICANT CHANGE UP (ref 10–45)
ANION GAP SERPL CALC-SCNC: 15 MMOL/L — SIGNIFICANT CHANGE UP (ref 5–17)
APPEARANCE UR: CLEAR — SIGNIFICANT CHANGE UP
APTT BLD: 26.7 SEC — LOW (ref 27.5–37.4)
AST SERPL-CCNC: 19 U/L — SIGNIFICANT CHANGE UP (ref 10–40)
BASOPHILS # BLD AUTO: 0.1 K/UL — SIGNIFICANT CHANGE UP (ref 0–0.2)
BASOPHILS NFR BLD AUTO: 0.8 % — SIGNIFICANT CHANGE UP (ref 0–2)
BILIRUB SERPL-MCNC: 0.4 MG/DL — SIGNIFICANT CHANGE UP (ref 0.2–1.2)
BILIRUB UR-MCNC: NEGATIVE — SIGNIFICANT CHANGE UP
BUN SERPL-MCNC: 11 MG/DL — SIGNIFICANT CHANGE UP (ref 7–23)
CALCIUM SERPL-MCNC: 9.1 MG/DL — SIGNIFICANT CHANGE UP (ref 8.4–10.5)
CHLORIDE SERPL-SCNC: 106 MMOL/L — SIGNIFICANT CHANGE UP (ref 96–108)
CO2 SERPL-SCNC: 22 MMOL/L — SIGNIFICANT CHANGE UP (ref 22–31)
COLOR SPEC: COLORLESS — SIGNIFICANT CHANGE UP
CREAT SERPL-MCNC: 0.63 MG/DL — SIGNIFICANT CHANGE UP (ref 0.5–1.3)
DIFF PNL FLD: NEGATIVE — SIGNIFICANT CHANGE UP
EOSINOPHIL # BLD AUTO: 0.1 K/UL — SIGNIFICANT CHANGE UP (ref 0–0.5)
EOSINOPHIL NFR BLD AUTO: 1.8 % — SIGNIFICANT CHANGE UP (ref 0–6)
GAS PNL BLDV: SIGNIFICANT CHANGE UP
GLUCOSE SERPL-MCNC: 84 MG/DL — SIGNIFICANT CHANGE UP (ref 70–99)
GLUCOSE UR QL: NEGATIVE — SIGNIFICANT CHANGE UP
HCT VFR BLD CALC: 46.5 % — HIGH (ref 34.5–45)
HGB BLD-MCNC: 14.5 G/DL — SIGNIFICANT CHANGE UP (ref 11.5–15.5)
INR BLD: 1.06 RATIO — SIGNIFICANT CHANGE UP (ref 0.88–1.16)
KETONES UR-MCNC: NEGATIVE — SIGNIFICANT CHANGE UP
LEUKOCYTE ESTERASE UR-ACNC: NEGATIVE — SIGNIFICANT CHANGE UP
LYMPHOCYTES # BLD AUTO: 1.6 K/UL — SIGNIFICANT CHANGE UP (ref 1–3.3)
LYMPHOCYTES # BLD AUTO: 22.1 % — SIGNIFICANT CHANGE UP (ref 13–44)
MCHC RBC-ENTMCNC: 29.2 PG — SIGNIFICANT CHANGE UP (ref 27–34)
MCHC RBC-ENTMCNC: 31.1 GM/DL — LOW (ref 32–36)
MCV RBC AUTO: 93.8 FL — SIGNIFICANT CHANGE UP (ref 80–100)
MONOCYTES # BLD AUTO: 0.4 K/UL — SIGNIFICANT CHANGE UP (ref 0–0.9)
MONOCYTES NFR BLD AUTO: 6 % — SIGNIFICANT CHANGE UP (ref 2–14)
NEUTROPHILS # BLD AUTO: 5 K/UL — SIGNIFICANT CHANGE UP (ref 1.8–7.4)
NEUTROPHILS NFR BLD AUTO: 69.2 % — SIGNIFICANT CHANGE UP (ref 43–77)
NITRITE UR-MCNC: NEGATIVE — SIGNIFICANT CHANGE UP
PH UR: 7 — SIGNIFICANT CHANGE UP (ref 5–8)
PLATELET # BLD AUTO: 227 K/UL — SIGNIFICANT CHANGE UP (ref 150–400)
POTASSIUM SERPL-MCNC: 4.4 MMOL/L — SIGNIFICANT CHANGE UP (ref 3.5–5.3)
POTASSIUM SERPL-SCNC: 4.4 MMOL/L — SIGNIFICANT CHANGE UP (ref 3.5–5.3)
PROT SERPL-MCNC: 7.2 G/DL — SIGNIFICANT CHANGE UP (ref 6–8.3)
PROT UR-MCNC: NEGATIVE — SIGNIFICANT CHANGE UP
PROTHROM AB SERPL-ACNC: 11.5 SEC — SIGNIFICANT CHANGE UP (ref 9.8–12.7)
RBC # BLD: 4.96 M/UL — SIGNIFICANT CHANGE UP (ref 3.8–5.2)
RBC # FLD: 14.6 % — HIGH (ref 10.3–14.5)
SODIUM SERPL-SCNC: 143 MMOL/L — SIGNIFICANT CHANGE UP (ref 135–145)
SP GR SPEC: 1.01 — LOW (ref 1.01–1.02)
UROBILINOGEN FLD QL: NEGATIVE — SIGNIFICANT CHANGE UP
WBC # BLD: 7.2 K/UL — SIGNIFICANT CHANGE UP (ref 3.8–10.5)
WBC # FLD AUTO: 7.2 K/UL — SIGNIFICANT CHANGE UP (ref 3.8–10.5)

## 2018-08-29 PROCEDURE — 99285 EMERGENCY DEPT VISIT HI MDM: CPT | Mod: GC,25

## 2018-08-29 PROCEDURE — 71045 X-RAY EXAM CHEST 1 VIEW: CPT | Mod: 26

## 2018-08-29 PROCEDURE — 70450 CT HEAD/BRAIN W/O DYE: CPT | Mod: 26

## 2018-08-29 PROCEDURE — 99223 1ST HOSP IP/OBS HIGH 75: CPT

## 2018-08-29 PROCEDURE — 93010 ELECTROCARDIOGRAM REPORT: CPT

## 2018-08-29 RX ORDER — SODIUM CHLORIDE 9 MG/ML
1000 INJECTION, SOLUTION INTRAVENOUS
Qty: 0 | Refills: 0 | Status: DISCONTINUED | OUTPATIENT
Start: 2018-08-29 | End: 2018-09-01

## 2018-08-29 RX ORDER — SIMETHICONE 80 MG/1
80 TABLET, CHEWABLE ORAL THREE TIMES A DAY
Qty: 0 | Refills: 0 | Status: DISCONTINUED | OUTPATIENT
Start: 2018-08-29 | End: 2018-09-06

## 2018-08-29 RX ORDER — HYDRALAZINE HCL 50 MG
5 TABLET ORAL ONCE
Qty: 0 | Refills: 0 | Status: COMPLETED | OUTPATIENT
Start: 2018-08-29 | End: 2018-08-30

## 2018-08-29 RX ORDER — AMLODIPINE BESYLATE 2.5 MG/1
10 TABLET ORAL DAILY
Qty: 0 | Refills: 0 | Status: DISCONTINUED | OUTPATIENT
Start: 2018-08-29 | End: 2018-09-06

## 2018-08-29 RX ORDER — DOCUSATE SODIUM 100 MG
100 CAPSULE ORAL DAILY
Qty: 0 | Refills: 0 | Status: DISCONTINUED | OUTPATIENT
Start: 2018-08-29 | End: 2018-09-04

## 2018-08-29 RX ORDER — POLYETHYLENE GLYCOL 3350 17 G/17G
17 POWDER, FOR SOLUTION ORAL DAILY
Qty: 0 | Refills: 0 | Status: DISCONTINUED | OUTPATIENT
Start: 2018-08-29 | End: 2018-09-06

## 2018-08-29 RX ORDER — HEPARIN SODIUM 5000 [USP'U]/ML
5000 INJECTION INTRAVENOUS; SUBCUTANEOUS EVERY 8 HOURS
Qty: 0 | Refills: 0 | Status: DISCONTINUED | OUTPATIENT
Start: 2018-08-29 | End: 2018-09-06

## 2018-08-29 RX ORDER — SENNA PLUS 8.6 MG/1
2 TABLET ORAL AT BEDTIME
Qty: 0 | Refills: 0 | Status: DISCONTINUED | OUTPATIENT
Start: 2018-08-29 | End: 2018-09-06

## 2018-08-29 RX ADMIN — SODIUM CHLORIDE 70 MILLILITER(S): 9 INJECTION, SOLUTION INTRAVENOUS at 23:21

## 2018-08-29 NOTE — H&P ADULT - NSHPREVIEWOFSYSTEMS_GEN_ALL_CORE
CONSTITUTIONAL: generalized weakness, weight loss  EYES/ENT: No visual changes, +dysphagia; denies odynophagia  NECK: No pain or stiffness  RESPIRATORY: No shortness of breath  CARDIOVASCULAR: No chest pain  EXTREMITIES: no le edema  MUSCULOSKELETAL: no joint pain, swelling  GASTROINTESTINAL: No abdominal or epigastric pain. No nausea, vomiting, or hematemesis; +bloating  BACK: No back pain  GENITOURINARY: No dysuria  NEUROLOGICAL: No numbness or weakness  SKIN: No itching, burning, rashes, or lesions   PSYCH: no agitation  All other review of systems is negative unless indicated above.

## 2018-08-29 NOTE — ED PROVIDER NOTE - MEDICAL DECISION MAKING DETAILS
89 F PMH of HTN, CAD s/p BREANNA (remote), PAD, and gout presenting in the ED with 10 lb weight loss, loss of appetite, difficulty swallowing over the past couple weeks.

## 2018-08-29 NOTE — ED ADULT NURSE REASSESSMENT NOTE - NS ED NURSE REASSESS COMMENT FT1
Dysphagia screen ordered per MD regardless of known dysphagia. Patient sitting at 90 degrees. 5mL water administered. Patient coughed and spit water out. Unable to tolerate PO. MD notified. NPO orders continued.

## 2018-08-29 NOTE — ED ADULT NURSE NOTE - OBJECTIVE STATEMENT
89y female coming in from home c/o difficulty swallowing. A&Ox3 and hypertensive. Hx of HTN, skin cancer, neuropathy and PAD. Pt c/o difficulty swallowing for a few weeks. Pt saw multiple PMD without diagnosis or improvement. Pt is poor historian. Friends at bedside report pt unable to tolerate PO food/fluid and is malnourished. Friends jose l report pt c/o epigastric pain with increased belching and flatulence.  Pt saw GI specialist yesterday who recommend pt go to hospital for speech and swallow evaluation. EMS place 20G IV right forearm and administer 1L normal saline. Pt denies chest pain, sob, fever/chills, n/v/d. Lungs CTA. Abdomen soft, nontender. 89y female coming in from home c/o difficulty swallowing. A&Ox3 and hypertensive. Hx of HTN, skin cancer, neuropathy and PAD. Pt c/o difficulty swallowing for a few weeks. Pt saw multiple PMD without diagnosis or improvement. Pt is poor historian. Friends at bedside report pt unable to tolerate PO food/fluid and is malnourished. Friends also report pt c/o epigastric pain with increased belching and flatulence and decreased appetite.  Pt saw GI specialist yesterday who recommend pt go to hospital for speech and swallow evaluation. EMS place 20G IV right forearm and administer 1L normal saline. Fingerstick 118.  Pt denies chest pain, sob, fever/chills, n/v/d. Lungs CTA. Abdomen soft, nontender.

## 2018-08-29 NOTE — ED PROVIDER NOTE - OBJECTIVE STATEMENT
89 F PMH of HTN, CAD s/p BREANNA (remote), PAD, and gout 89 F PMH of HTN, CAD s/p BREANNA (remote), PAD, and gout presenting in the ED with 10 lb weight loss, loss of appetite, difficulty swallowing over the past couple weeks. Patient was seen in an OSH yesterday, where her provider wanted to admit her for a speech and swallow, UA/CXR for infection, and CT chest, abdomen, pelvis for malignancy, but she left in favor for this hospital. Patient has been endorsing difficulty swallowing for a few weeks, describing 89 F PMH of HTN, CAD s/p BREANNA (remote), PAD, and gout presenting in the ED with 10 lb weight loss, loss of appetite, difficulty swallowing over the past couple weeks. Patient was seen in an OSH yesterday, where her provider wanted to admit her for a speech and swallow, UA/CXR for infection, and CT chest, abdomen, pelvis for malignancy, but she left in favor for this hospital. Patient has been endorsing difficulty swallowing for a few weeks, describing it as "getting stuck", denies pain with swallowing. Patient has not been able to tolerate solids or liquids. Per patients friends, who have been living with her, patient has also been having difficulty ambulating. Patient also endorses intermittent epigastric pain that has been going on for months with associated nausea. Pain occurs more at night, does not radiate, and self-resolves. Patient denies diarrhea, melena, hematochezia. Patient currently denies fever, chills, nausea, vomiting, CP, SOB, abdominal pain. 89 F PMH of HTN, CAD s/p BREANNA (remote), PAD, and gout presenting in the ED with 10 lb weight loss, loss of appetite, difficulty swallowing over the past couple weeks. Patient was seen in an OSH yesterday, where her provider wanted to admit her for a speech and swallow, UA/CXR for infection, and CT chest, abdomen, pelvis for malignancy, but she left in favor for this hospital. Patient has been endorsing difficulty swallowing for a few weeks, describing it as "getting stuck", denies pain with swallowing. Patient has not been able to tolerate solids or liquids. Patient endorses having had vocal cord polypectomy many years ago.  Per patients friends, who have been living with her, patient has also been having difficulty ambulating. Patient also endorses intermittent epigastric pain that has been going on for months with associated nausea. Pain occurs more at night, does not radiate, and self-resolves. Patient denies diarrhea, melena, hematochezia. Patient currently denies fever, chills, nausea, vomiting, CP, SOB, abdominal pain.

## 2018-08-29 NOTE — ED ADULT NURSE NOTE - NSIMPLEMENTINTERV_GEN_ALL_ED
Implemented All Fall with Harm Risk Interventions:  [place] to call system. Call bell, personal items and telephone within reach. Instruct patient to call for assistance. Room bathroom lighting operational. Non-slip footwear when patient is off stretcher. Physically safe environment: no spills, clutter or unnecessary equipment. Stretcher in lowest position, wheels locked, appropriate side rails in place. Provide visual cue, wrist band, yellow gown, etc. Monitor gait and stability. Monitor for mental status changes and reorient to person, place, and time. Review medications for side effects contributing to fall risk. Reinforce activity limits and safety measures with patient and family. Provide visual clues: red socks.

## 2018-08-29 NOTE — H&P ADULT - PROBLEM SELECTOR PLAN 5
I was unable to have a complete discussion regarding goals of care and advanced directives.  Will need to have goals of care discussion in morning (currently patient wants to assign her friend, Estefany Miranda as HCP, but patient is not willing to have discussion regarding further testing, goals of care, or resuscitative measures

## 2018-08-29 NOTE — H&P ADULT - NSHPLABSRESULTS_GEN_ALL_CORE
Labs personally reviewed:                          14.5   7.2   )-----------( 227      ( 29 Aug 2018 18:42 )             46.5         143  |  106  |  11  ----------------------------<  84  4.4   |  22  |  0.63    Ca    9.1      29 Aug 2018 18:42  Mg     2.3         TPro  7.2  /  Alb  4.0  /  TBili  0.4  /  DBili  x   /  AST  19  /  ALT  11  /  AlkPhos  66          LIVER FUNCTIONS - ( 29 Aug 2018 18:42 )  Alb: 4.0 g/dL / Pro: 7.2 g/dL / ALK PHOS: 66 U/L / ALT: 11 U/L / AST: 19 U/L / GGT: x           PT/INR - ( 29 Aug 2018 18:42 )   PT: 11.5 sec;   INR: 1.06 ratio         PTT - ( 29 Aug 2018 18:42 )  PTT:26.7 sec  Urinalysis Basic - ( 29 Aug 2018 18:55 )    Color: Colorless / Appearance: Clear / S.008 / pH: x  Gluc: x / Ketone: Negative  / Bili: Negative / Urobili: Negative   Blood: x / Protein: Negative / Nitrite: Negative   Leuk Esterase: Negative / RBC: x / WBC x   Sq Epi: x / Non Sq Epi: x / Bacteria: x      CAPILLARY BLOOD GLUCOSE          Imaging:  CXR personally reviewed: no focal opacity  CT head reviewed: volume loss, microvascular disease    Reviewed previous records:   2018 CT lumbosacral spine: generalized osteopenia, Grade 1 anterolisthesis L4/L5  CT chest/CT head/CT cervical spine: air fluid level right maxillary and left fronta sinus; b/l ethmoid and left maxillary sinus mucosal thickening.    EKG personally reviewed: nsr, no acute st changes

## 2018-08-29 NOTE — H&P ADULT - PROBLEM SELECTOR PLAN 1
Patient with dysphagia to solid and liquid, along with weight loss with ?hx of GERD, hiatal hernia, concern for possible malignancy  unclear what work up patient will be amenable to, at this time she is crying with any suggestion for further testing  Can discuss further tomorrow  for now, will order speech and swallow evaluation  keep NPO  Start IVF

## 2018-08-29 NOTE — H&P ADULT - NSHPPHYSICALEXAM_GEN_ALL_CORE
PHYSICAL EXAM:  Vital Signs Last 24 Hrs  T(C): 36.4 (08-29-18 @ 17:58)  T(F): 97.5 (08-29-18 @ 17:58), Max: 99 (08-29-18 @ 17:55)  HR: 76 (08-29-18 @ 17:58) (74 - 76)  BP: 191/89 (08-29-18 @ 17:58)  BP(mean): --  RR: 18 (08-29-18 @ 17:58) (18 - 18)  SpO2: 96% (08-29-18 @ 17:58) (95% - 96%)  Wt(kg): --    Constitutional: NAD, awake and alert  EYES: EOMI  ENMT:  Normal Hearing, no tonsillar exudates ; dry mucous membrane  Neck: Soft and supple, No JVD  Lungs: Breath sounds are clear bilaterally, No wheezing, rales or rhonchi  Heart: S1 and S2, regular rate and rhythm, no Murmurs, gallops or rubs  Abdomen: Bowel Sounds present, soft, nontender, nondistended, no guarding, no rebound  Extremities: No cyanosis or clubbing; warm to touch  Vascular: 2+ peripheral pulses lower ex  Neurological: A/O x 3, no focal deficits  Musculoskeletal: 5/5 strength b/l upper and lower extremities  Skin: chronic skin changes b/l LE  Psych: no depression or anhedonia  HEME: no bruises, no nose bleeds PHYSICAL EXAM:  Vital Signs Last 24 Hrs  T(C): 36.4 (08-29-18 @ 17:58)  T(F): 97.5 (08-29-18 @ 17:58), Max: 99 (08-29-18 @ 17:55)  HR: 76 (08-29-18 @ 17:58) (74 - 76)  BP: 191/89 (08-29-18 @ 17:58)  BP(mean): --  RR: 18 (08-29-18 @ 17:58) (18 - 18)  SpO2: 96% (08-29-18 @ 17:58) (95% - 96%)  Wt(kg): --    Constitutional: NAD, awake and alert  EYES: EOMI  ENMT:  dry mucous membrane  Neck: Soft and supple, No JVD  Lungs: Breath sounds are clear bilaterally, No wheezing, rales or rhonchi  Heart: S1 and S2, regular rate and rhythm, no Murmurs  Abdomen: Bowel Sounds present, soft, nontender, nondistended, no guarding, no rebound  Extremities: No cyanosis or clubbing; warm to touch  Vascular: 2+ peripheral pulses lower ex  Neurological: A/O x 3, no focal deficits  Musculoskeletal: 5/5 strength b/l upper and lower extremities  Skin: chronic skin changes b/l LE  Psych: no depression or anhedonia  HEME: no bruises, no nose bleeds

## 2018-08-29 NOTE — ED PROVIDER NOTE - CARE PLAN
Principal Discharge DX:	Dysphagia, unspecified type  Secondary Diagnosis:	Failure to thrive in adult

## 2018-08-29 NOTE — H&P ADULT - ASSESSMENT
89 yo female with PMH of HTN, CAD s/p stent, PAD, gout, presents here with dysphagia, weight loss and difficulty ambulating.

## 2018-08-29 NOTE — H&P ADULT - PROBLEM SELECTOR PLAN 2
Likely due to underlying dysphagia and not able to tolerate PO  no signs of infection  ?malignancy  Will need to have goals of care discussion in morning (currently patient wants to assign her friend, Estefany Miranda as HCP, but patient is not willing to discuss further regarding goals of care, or resuscitative measures)  Will get PT evaluation  check B12, RPR, vitamin D level

## 2018-08-29 NOTE — ED PROVIDER NOTE - ATTENDING CONTRIBUTION TO CARE
attending Cathieack: 89yF h/o HTN, CAD s/p BREANNA (remote), PAD, and gout presents with subacute complaints of unintentional weight loss, decreased PO intake and difficulty swallowing (sensation of food "getting stuck"). FTT with concern for possible stroke (?dysphagia) vs malignancy. Will obtain labs, UA, CTH, cxr and admit

## 2018-08-29 NOTE — H&P ADULT - HISTORY OF PRESENT ILLNESS
89 yo female with PMH of HTN, CAD s/p stent, PAD, gout, presents here with dysphagia, weight loss and difficulty ambulating.      Patient was admitted in January for difficulty ambulating after a mechanical fall with trauma to the back and neck.  She had significant weakness b/l upper and lower extremities.  CT head, neck, lumbosacral, CT chest were all negative.  Patient was discharged to rehab. 89 yo female with PMH of HTN, CAD s/p stent, PAD, gout, presents here with dysphagia, weight loss and difficulty ambulating.  History is limited as patient cannot provide details.  She lives with her friend (Meena [phone]), whom I could not reach over the phone.  Limited history obtained from patient and her friend, Estefany Miranda.     Patient has been having difficulty swallowing for months, recently getting worse.  She says that she had difficulty swallowing solid before, now has difficulty swallowing liquid as well.  At night, she tends to choke on her own spit.  She has not been able to eat or drink for last few days.  She denies pain with swallowing. She has been having weight loss over last few months, unable to quantify.  Per friend, patient has become so weak that now she has trouble walking.  Patient walks slow and feels like she will fall.  Patient saw a doctor yesterday who suggested that she get admitted for further work up.  Patient otherwise denies nausea, vomiting, diarrhea, fever, cough, SOB.    Patient was admitted in January for difficulty ambulating after a mechanical fall with trauma to the back and neck.  She had significant weakness b/l upper and lower extremities.  CT head, neck, lumbosacral, CT chest were all negative.  Patient was discharged to rehab.  During my interview, patient becomes anxious and starts crying with questions and when suggested further testing.

## 2018-08-30 LAB
24R-OH-CALCIDIOL SERPL-MCNC: 28.8 NG/ML — LOW (ref 30–80)
ALBUMIN SERPL ELPH-MCNC: 4 G/DL — SIGNIFICANT CHANGE UP (ref 3.3–5)
ALP SERPL-CCNC: 64 U/L — SIGNIFICANT CHANGE UP (ref 40–120)
ALT FLD-CCNC: 10 U/L — SIGNIFICANT CHANGE UP (ref 10–45)
ANION GAP SERPL CALC-SCNC: 12 MMOL/L — SIGNIFICANT CHANGE UP (ref 5–17)
AST SERPL-CCNC: 17 U/L — SIGNIFICANT CHANGE UP (ref 10–40)
BASOPHILS # BLD AUTO: 0.04 K/UL — SIGNIFICANT CHANGE UP (ref 0–0.2)
BASOPHILS NFR BLD AUTO: 0.5 % — SIGNIFICANT CHANGE UP (ref 0–2)
BILIRUB SERPL-MCNC: 0.5 MG/DL — SIGNIFICANT CHANGE UP (ref 0.2–1.2)
BUN SERPL-MCNC: 8 MG/DL — SIGNIFICANT CHANGE UP (ref 7–23)
CALCIUM SERPL-MCNC: 9.6 MG/DL — SIGNIFICANT CHANGE UP (ref 8.4–10.5)
CHLORIDE SERPL-SCNC: 104 MMOL/L — SIGNIFICANT CHANGE UP (ref 96–108)
CO2 SERPL-SCNC: 27 MMOL/L — SIGNIFICANT CHANGE UP (ref 22–31)
CREAT SERPL-MCNC: 0.61 MG/DL — SIGNIFICANT CHANGE UP (ref 0.5–1.3)
CULTURE RESULTS: SIGNIFICANT CHANGE UP
EOSINOPHIL # BLD AUTO: 0.27 K/UL — SIGNIFICANT CHANGE UP (ref 0–0.5)
EOSINOPHIL NFR BLD AUTO: 3.1 % — SIGNIFICANT CHANGE UP (ref 0–6)
GLUCOSE SERPL-MCNC: 122 MG/DL — HIGH (ref 70–99)
HCT VFR BLD CALC: 45 % — SIGNIFICANT CHANGE UP (ref 34.5–45)
HGB BLD-MCNC: 14.5 G/DL — SIGNIFICANT CHANGE UP (ref 11.5–15.5)
IMM GRANULOCYTES NFR BLD AUTO: 0.1 % — SIGNIFICANT CHANGE UP (ref 0–1.5)
LYMPHOCYTES # BLD AUTO: 2.13 K/UL — SIGNIFICANT CHANGE UP (ref 1–3.3)
LYMPHOCYTES # BLD AUTO: 24.1 % — SIGNIFICANT CHANGE UP (ref 13–44)
MAGNESIUM SERPL-MCNC: 2.3 MG/DL — SIGNIFICANT CHANGE UP (ref 1.6–2.6)
MCHC RBC-ENTMCNC: 30.5 PG — SIGNIFICANT CHANGE UP (ref 27–34)
MCHC RBC-ENTMCNC: 32.2 GM/DL — SIGNIFICANT CHANGE UP (ref 32–36)
MCV RBC AUTO: 94.7 FL — SIGNIFICANT CHANGE UP (ref 80–100)
MONOCYTES # BLD AUTO: 0.85 K/UL — SIGNIFICANT CHANGE UP (ref 0–0.9)
MONOCYTES NFR BLD AUTO: 9.6 % — SIGNIFICANT CHANGE UP (ref 2–14)
NEUTROPHILS # BLD AUTO: 5.52 K/UL — SIGNIFICANT CHANGE UP (ref 1.8–7.4)
NEUTROPHILS NFR BLD AUTO: 62.6 % — SIGNIFICANT CHANGE UP (ref 43–77)
PHOSPHATE SERPL-MCNC: 3.3 MG/DL — SIGNIFICANT CHANGE UP (ref 2.5–4.5)
PLATELET # BLD AUTO: 256 K/UL — SIGNIFICANT CHANGE UP (ref 150–400)
POTASSIUM SERPL-MCNC: 3.7 MMOL/L — SIGNIFICANT CHANGE UP (ref 3.5–5.3)
POTASSIUM SERPL-SCNC: 3.7 MMOL/L — SIGNIFICANT CHANGE UP (ref 3.5–5.3)
PROT SERPL-MCNC: 7 G/DL — SIGNIFICANT CHANGE UP (ref 6–8.3)
RBC # BLD: 4.75 M/UL — SIGNIFICANT CHANGE UP (ref 3.8–5.2)
RBC # FLD: 15.6 % — HIGH (ref 10.3–14.5)
SODIUM SERPL-SCNC: 143 MMOL/L — SIGNIFICANT CHANGE UP (ref 135–145)
SPECIMEN SOURCE: SIGNIFICANT CHANGE UP
T PALLIDUM AB TITR SER: NEGATIVE — SIGNIFICANT CHANGE UP
VIT B12 SERPL-MCNC: >2000 PG/ML — HIGH (ref 232–1245)
WBC # BLD: 8.82 K/UL — SIGNIFICANT CHANGE UP (ref 3.8–10.5)
WBC # FLD AUTO: 8.82 K/UL — SIGNIFICANT CHANGE UP (ref 3.8–10.5)

## 2018-08-30 PROCEDURE — 99233 SBSQ HOSP IP/OBS HIGH 50: CPT

## 2018-08-30 RX ORDER — ACETAMINOPHEN 500 MG
750 TABLET ORAL ONCE
Qty: 0 | Refills: 0 | Status: COMPLETED | OUTPATIENT
Start: 2018-08-30 | End: 2018-08-30

## 2018-08-30 RX ADMIN — Medication 750 MILLIGRAM(S): at 23:00

## 2018-08-30 RX ADMIN — Medication 300 MILLIGRAM(S): at 22:31

## 2018-08-30 RX ADMIN — Medication 5 MILLIGRAM(S): at 00:20

## 2018-08-30 RX ADMIN — SODIUM CHLORIDE 70 MILLILITER(S): 9 INJECTION, SOLUTION INTRAVENOUS at 22:32

## 2018-08-30 RX ADMIN — Medication 100 MILLIGRAM(S): at 15:02

## 2018-08-30 RX ADMIN — HEPARIN SODIUM 5000 UNIT(S): 5000 INJECTION INTRAVENOUS; SUBCUTANEOUS at 22:33

## 2018-08-30 RX ADMIN — HEPARIN SODIUM 5000 UNIT(S): 5000 INJECTION INTRAVENOUS; SUBCUTANEOUS at 05:46

## 2018-08-30 RX ADMIN — HEPARIN SODIUM 5000 UNIT(S): 5000 INJECTION INTRAVENOUS; SUBCUTANEOUS at 15:02

## 2018-08-30 RX ADMIN — SODIUM CHLORIDE 70 MILLILITER(S): 9 INJECTION, SOLUTION INTRAVENOUS at 17:57

## 2018-08-30 RX ADMIN — AMLODIPINE BESYLATE 10 MILLIGRAM(S): 2.5 TABLET ORAL at 09:48

## 2018-08-30 NOTE — SWALLOW BEDSIDE ASSESSMENT ADULT - SWALLOW EVAL: PATIENT/FAMILY GOALS STATEMENT
History obtained from pt, pt's friend Estefany at the bedside and pt's friend Meena via telephone conversation. Per report excessive, white phlegm comes up in the throat, all night long, pt is fearful of choking and is forced to sit up. Lately it has worsened. Pt's throat fills with mucous and when she attempts to swallow mucous "her eyes widen and then she chokes and spits it up." Pt has a "swallow cup and a spit cup" at night. Meena reports that pt does not have difficulty with solid food but difficulty with liquids. Reporting when drinking liquid she spits up phlegm for "one full hour" and that she "chokes unless she spits it out." Meena also reported pt had difficulty swallowing applesauce recently, in which she was bringing up phlegm. Pt denies changes in vocal quality. 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 No further ENT hx or f/u. History obtained from pt, pt's friend Estefany at the bedside and pt's friend Meena via telephone conversation. Per report excessive, white phlegm comes up in the throat, all night long, pt is fearful of choking and is forced to sit up when sleeping. Lately it has worsened. Pt's throat fills with mucous and when she attempts to swallow water "her eyes widen and then she chokes and spits it up." Pt has a "swallow cup and a spit cup" at night. Meena reports that pt does not have difficulty with solid food but difficulty with liquids. Reporting when drinking liquid she spits up phlegm for "one full hour" and that she "chokes unless she spits it out." Meena also reported pt had difficulty swallowing applesauce recently, in which she was bringing up phlegm. Pt denies changes in vocal quality. 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 No further ENT hx or f/u.

## 2018-08-30 NOTE — PROGRESS NOTE ADULT - SUBJECTIVE AND OBJECTIVE BOX
Moi Ramirez MD  Pager [phone]  Spectra [phone]  Cell Phone [phone]    Patient is a 89y old  Female who presents with a chief complaint of difficulty swallowing, weight loss (29 Aug 2018 21:38)        SUBJECTIVE / OVERNIGHT EVENTS: No new events overnight. Patient upset that she is still in the ED.       MEDICATIONS  (STANDING):  amLODIPine   Tablet 10 milliGRAM(s) Oral daily  dextrose 5% + sodium chloride 0.45%. 1000 milliLiter(s) (70 mL/Hr) IV Continuous <Continuous>  docusate sodium 100 milliGRAM(s) Oral daily  heparin  Injectable 5000 Unit(s) SubCutaneous every 8 hours  senna 2 Tablet(s) Oral at bedtime    MEDICATIONS  (PRN):  polyethylene glycol 3350 17 Gram(s) Oral daily PRN Constipation  simethicone 80 milliGRAM(s) Chew three times a day PRN Gas      Vital Signs Last 24 Hrs  T(C): 36.5 (30 Aug 2018 07:51), Max: 37.2 (29 Aug 2018 17:55)  T(F): 97.7 (30 Aug 2018 07:51), Max: 99 (29 Aug 2018 17:55)  HR: 62 (30 Aug 2018 07:51) (62 - 97)  BP: 150/72 (30 Aug 2018 08:20) (140/80 - 192/85)  BP(mean): --  RR: 18 (30 Aug 2018 07:51) (17 - 18)  SpO2: 98% (30 Aug 2018 07:51) (95% - 99%)  CAPILLARY BLOOD GLUCOSE        I&O's Summary        PHYSICAL EXAM  GENERAL: NAD, cachetic appearing  HEAD:  Atraumatic, Normocephalic  EYES: EOMI, PERRLA, conjunctiva and sclera clear  CHEST/LUNG: Clear to auscultation bilaterally; No wheeze  HEART: Regular rate and rhythm; No murmurs, rubs, or gallops  ABDOMEN: Soft, Nontender, Nondistended; Bowel sounds present  EXTREMITIES:  2+ Peripheral Pulses, No clubbing, cyanosis; trace pedal edema b/l        LABS:                        14.5   8.82  )-----------( 256      ( 30 Aug 2018 08:04 )             45.0     08-30    143  |  104  |  8   ----------------------------<  122<H>  3.7   |  27  |  0.61    Ca    9.6      30 Aug 2018 05:51  Phos  3.3     -  Mg     2.3         TPro  7.0  /  Alb  4.0  /  TBili  0.5  /  DBili  x   /  AST  17  /  ALT  10  /  AlkPhos  64  08-30    PT/INR - ( 29 Aug 2018 18:42 )   PT: 11.5 sec;   INR: 1.06 ratio         PTT - ( 29 Aug 2018 18:42 )  PTT:26.7 sec      Urinalysis Basic - ( 29 Aug 2018 18:55 )    Color: Colorless / Appearance: Clear / S.008 / pH: x  Gluc: x / Ketone: Negative  / Bili: Negative / Urobili: Negative   Blood: x / Protein: Negative / Nitrite: Negative   Leuk Esterase: Negative / RBC: x / WBC x   Sq Epi: x / Non Sq Epi: x / Bacteria: x

## 2018-08-30 NOTE — SWALLOW BEDSIDE ASSESSMENT ADULT - SWALLOW EVAL: DIAGNOSIS
Pt presents with an oral and suspected pharyngeal dysphagia characterized by delayed oral transit time, reduced hyolaryngeal elevation upon palpation, repeat swallows post intake, c/o "it is slow to go down" re: puree texture and honey thick liquid. Pt with instance of effortful swallow and subtle cough post intake of thin puree texture suggestive of laryngeal penetration/aspiration.

## 2018-08-30 NOTE — SWALLOW BEDSIDE ASSESSMENT ADULT - ASR SWALLOW REFERRAL
Consider GI consult given pt/family report Consider GI consult given pt/family report; Consider ENT consult given dysphonia

## 2018-08-30 NOTE — SWALLOW BEDSIDE ASSESSMENT ADULT - SLP PERTINENT HISTORY OF CURRENT PROBLEM
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 89 yo female with PMH of HTN, CAD s/p stent, PAD, gout, presents here with dysphagia, weight loss and difficulty ambulating.  History is limited as patient cannot provide details.  She lives with her friend (Meena [phone]), whom I could not be reached over the phone at the time.  Limited history obtained from patient and her friend, Estefany Miranda. Patient has been having difficulty swallowing for months, recently getting worse.  She says that she had difficulty swallowing solid before, now has difficulty swallowing liquid as well.  At night, she tends to choke on her own spit.  She has not been able to eat or drink for last few days.  She denies pain with swallowing. She has been having weight loss over last few months, unable to quantify.

## 2018-08-30 NOTE — PROGRESS NOTE ADULT - PROBLEM SELECTOR PLAN 2
Likely due to underlying dysphagia and inability to tolerate PO  Patient lives with her sister who is currently in [facility] for TIFFANIE - needs SW eval for safe discharge plan  Await PT evaluation for possibility of TIFFANIE  await RPR, vitamin D level

## 2018-08-30 NOTE — CHART NOTE - NSCHARTNOTEFT_GEN_A_CORE
MEDICINE JOSE BEASLEY  Patient is a 89y old  Female who presents with a chief complaint of difficulty swallowing and weight loss. (29 Aug 2018 21:38)       Event Summary:  Called by RN to report patient with hypertension - BP of 192/85.  Patient seen and examined at the bedside.  Per patient, she does not remember if she took her home dose of Norvasc this AM.  She does not endorse any complaints at this time.  Denies dizziness, headache, chest pain, palpitations, shortness of breath, abdominal pain, nausea, vomiting and diarrhea.      Vital Signs Last 24 Hrs  T(C): 36.4 (29 Aug 2018 23:53), Max: 37.2 (29 Aug 2018 17:55)  T(F): 97.6 (29 Aug 2018 23:53), Max: 99 (29 Aug 2018 17:55)  HR: 74 (29 Aug 2018 23:53) (74 - 97)  BP: 192/85 (29 Aug 2018 23:53) (179/74 - 192/85)  RR: 17 (29 Aug 2018 23:53) (17 - 18)  SpO2: 99% (29 Aug 2018 23:53) (95% - 99%)      PHYSICAL EXAM:  GENERAL: Laying down, in no acute distress  HEART: +S1/S2.  Regular rate and rhythm.  No murmurs, rubs or gallops  CHEST/LUNG: Breath sounds clear to auscultation bilaterally.  No wheezes, rales, rhonchi or crackles  ABDOMEN: Bowel sounds present in all 4 quadrants.  Soft, Nontender, Nondistended      HPI:  Patient is a 89 year old female with a PMHx of HTN, CAD (S/P stent), PAD and gout who presented with dysphagia, weight loss and difficulty ambulating. (29 Aug 2018 21:38)  Now with hypertension.      PLAN: Hypertension  - IV Hydralazine 5mg x1 ordered  - Will re-assess BP  - Continue with home medication Norvasc  - Can consider changing regimen if BP remains elevated  - Will continue to monitor closely  - Primary team to follow up in AM      #[id number]  Maddie Persaud PA-C  Medicine Department

## 2018-08-30 NOTE — CHART NOTE - NSCHARTNOTEFT_GEN_A_CORE
Medicine Accept Note:     89 yo female with PMH of HTN, CAD s/p stent, PAD, gout, presents here with dysphagia, weight loss and difficulty ambulating possibly 2/2 underlying malignancy vs progressive decline from inability to tolerate po.    To do:   [ ] f/u speech and swallow c/s for dysphagia Medicine Accept Note:     89 yo female with PMH of HTN, CAD s/p stent, PAD, gout, presents here with dysphagia, weight loss and difficulty ambulating possibly 2/2 underlying malignancy vs progressive decline from inability to tolerate po.    To do:   [ ] MBS tomorrow as per speech and swallow, possible esophagram to evaluate for any malignancy   [ ] Consider GI c/s, awaiting MBS, esophagram       Yohannes Reid, PGY-2

## 2018-08-30 NOTE — SWALLOW BEDSIDE ASSESSMENT ADULT - ASR SWALLOW RECOMMEND DIAG
MD, please write order for Modified Barium Swallow Study. Will schedule exam upon receipt of order in Radiology. Per MD Reid, pt cleared for intake of barium./VFSS/MBS

## 2018-08-30 NOTE — SWALLOW BEDSIDE ASSESSMENT ADULT - PHARYNGEAL PHASE
Decreased laryngeal elevation/Instance of effortful swallow and subtle cough post intake of thin puree texture suggestive of laryngeal penetration/aspiration./Multiple swallows

## 2018-08-30 NOTE — SWALLOW BEDSIDE ASSESSMENT ADULT - NS SPL SWALLOW CLINIC TRIAL FT
Pt reported "it is slow to go down" referring to all PO consistencies, when asked location pt pointed inside oral cavity. Unable to elaborate further.

## 2018-08-30 NOTE — SWALLOW BEDSIDE ASSESSMENT ADULT - SLP GENERAL OBSERVATIONS
Pt received in bed. +2L NC. Cachectic. Grossly A&Ox2 (to self and grossly to hospital). Word finding difficulty noted. Impaired ability to express wants/needs/history. + command following. Vocal quality dysphonic. Pt's friends questioned this clinician whether pt had a stroke due to word finding difficulty->MD Reid made aware of pt/family concerns. Pt also expressed to this service pressure pointing to substernal region, MD Reid made aware.

## 2018-08-30 NOTE — SWALLOW BEDSIDE ASSESSMENT ADULT - COMMENTS
Hx cont: Per friend, patient has become so weak that now she has trouble walking.  Patient walks slow and feels like she will fall.  Patient saw a doctor yesterday who suggested that she get admitted for further work up.  Patient otherwise denies nausea, vomiting, diarrhea, fever, cough, SOB.  Patient was admitted in January for difficulty ambulating after a mechanical fall with trauma to the back and neck.  She had significant weakness b/l upper and lower extremities.  CT head, neck, lumbosacral, CT chest were all negative.  Patient was discharged to rehab.  During MD interview, patient became anxious and started crying with questions and when suggested further testing. Of note, PMHx significant for hiatal hernia and Past Sx Hx Polyp, vocal cord. Patient with dysphagia to solid and liquid, along with weight loss with ?hx of GERD, hiatal hernia, concern for possible malignancy  unclear what work up patient will be amenable to, at this time she is crying with any suggestion for further testing. Will order speech and swallow evaluation, keep NPO. Failure to thrive in adult likely due to underlying dysphagia and not able to tolerate PO. No signs of infection. GOC discussion pending. Medicine: Appears to have progressive decline in functional status.  IMAGIN/29 CXR: Clear lungs   CT head: No change from 2018, volume loss and microvascular disease, no acute hemorrhage or midline shift, if symptoms persist consider follow-up CT or MRI if no contraindications

## 2018-08-31 DIAGNOSIS — R12 HEARTBURN: ICD-10-CM

## 2018-08-31 LAB
ALBUMIN SERPL ELPH-MCNC: 3.8 G/DL — SIGNIFICANT CHANGE UP (ref 3.3–5)
ALP SERPL-CCNC: 66 U/L — SIGNIFICANT CHANGE UP (ref 40–120)
ALT FLD-CCNC: 10 U/L — SIGNIFICANT CHANGE UP (ref 10–45)
ANION GAP SERPL CALC-SCNC: 12 MMOL/L — SIGNIFICANT CHANGE UP (ref 5–17)
AST SERPL-CCNC: 17 U/L — SIGNIFICANT CHANGE UP (ref 10–40)
BILIRUB SERPL-MCNC: 0.4 MG/DL — SIGNIFICANT CHANGE UP (ref 0.2–1.2)
BUN SERPL-MCNC: 6 MG/DL — LOW (ref 7–23)
CALCIUM SERPL-MCNC: 9.4 MG/DL — SIGNIFICANT CHANGE UP (ref 8.4–10.5)
CHLORIDE SERPL-SCNC: 102 MMOL/L — SIGNIFICANT CHANGE UP (ref 96–108)
CO2 SERPL-SCNC: 26 MMOL/L — SIGNIFICANT CHANGE UP (ref 22–31)
CREAT SERPL-MCNC: 0.62 MG/DL — SIGNIFICANT CHANGE UP (ref 0.5–1.3)
GLUCOSE SERPL-MCNC: 117 MG/DL — HIGH (ref 70–99)
HCT VFR BLD CALC: 47.9 % — HIGH (ref 34.5–45)
HGB BLD-MCNC: 15.3 G/DL — SIGNIFICANT CHANGE UP (ref 11.5–15.5)
MAGNESIUM SERPL-MCNC: 2.3 MG/DL — SIGNIFICANT CHANGE UP (ref 1.6–2.6)
MCHC RBC-ENTMCNC: 29.8 PG — SIGNIFICANT CHANGE UP (ref 27–34)
MCHC RBC-ENTMCNC: 32 GM/DL — SIGNIFICANT CHANGE UP (ref 32–36)
MCV RBC AUTO: 93.2 FL — SIGNIFICANT CHANGE UP (ref 80–100)
PHOSPHATE SERPL-MCNC: 3.2 MG/DL — SIGNIFICANT CHANGE UP (ref 2.5–4.5)
PLATELET # BLD AUTO: 230 K/UL — SIGNIFICANT CHANGE UP (ref 150–400)
POTASSIUM SERPL-MCNC: 3.3 MMOL/L — LOW (ref 3.5–5.3)
POTASSIUM SERPL-SCNC: 3.3 MMOL/L — LOW (ref 3.5–5.3)
PROT SERPL-MCNC: 6.8 G/DL — SIGNIFICANT CHANGE UP (ref 6–8.3)
RBC # BLD: 5.14 M/UL — SIGNIFICANT CHANGE UP (ref 3.8–5.2)
RBC # FLD: 14.4 % — SIGNIFICANT CHANGE UP (ref 10.3–14.5)
SODIUM SERPL-SCNC: 140 MMOL/L — SIGNIFICANT CHANGE UP (ref 135–145)
WBC # BLD: 7.1 K/UL — SIGNIFICANT CHANGE UP (ref 3.8–10.5)
WBC # FLD AUTO: 7.1 K/UL — SIGNIFICANT CHANGE UP (ref 3.8–10.5)

## 2018-08-31 PROCEDURE — 99233 SBSQ HOSP IP/OBS HIGH 50: CPT | Mod: GC

## 2018-08-31 PROCEDURE — 74230 X-RAY XM SWLNG FUNCJ C+: CPT | Mod: 26

## 2018-08-31 RX ORDER — PANTOPRAZOLE SODIUM 20 MG/1
40 TABLET, DELAYED RELEASE ORAL
Qty: 0 | Refills: 0 | Status: DISCONTINUED | OUTPATIENT
Start: 2018-08-31 | End: 2018-09-06

## 2018-08-31 RX ORDER — POTASSIUM CHLORIDE 20 MEQ
40 PACKET (EA) ORAL ONCE
Qty: 0 | Refills: 0 | Status: COMPLETED | OUTPATIENT
Start: 2018-08-31 | End: 2018-09-01

## 2018-08-31 RX ORDER — POTASSIUM CHLORIDE 20 MEQ
10 PACKET (EA) ORAL ONCE
Qty: 0 | Refills: 0 | Status: DISCONTINUED | OUTPATIENT
Start: 2018-08-31 | End: 2018-08-31

## 2018-08-31 RX ORDER — POTASSIUM CHLORIDE 20 MEQ
10 PACKET (EA) ORAL ONCE
Qty: 0 | Refills: 0 | Status: COMPLETED | OUTPATIENT
Start: 2018-08-31 | End: 2018-08-31

## 2018-08-31 RX ADMIN — PANTOPRAZOLE SODIUM 40 MILLIGRAM(S): 20 TABLET, DELAYED RELEASE ORAL at 16:47

## 2018-08-31 RX ADMIN — HEPARIN SODIUM 5000 UNIT(S): 5000 INJECTION INTRAVENOUS; SUBCUTANEOUS at 06:46

## 2018-08-31 RX ADMIN — HEPARIN SODIUM 5000 UNIT(S): 5000 INJECTION INTRAVENOUS; SUBCUTANEOUS at 22:26

## 2018-08-31 RX ADMIN — HEPARIN SODIUM 5000 UNIT(S): 5000 INJECTION INTRAVENOUS; SUBCUTANEOUS at 16:47

## 2018-08-31 NOTE — SWALLOW VFSS/MBS ASSESSMENT ADULT - LARYNGEAL PENETRATION DURING THE SWALLOW - SILENT
Inconsistent trace penetration over the superior laryngeal surface of the arytenoids. This was spontaneously cleared from the vestibule/Trace

## 2018-08-31 NOTE — SWALLOW VFSS/MBS ASSESSMENT ADULT - ORAL PHASE COMMENTS
Trace residue noted in the visualized portion of the oral cavity (lingual). Maximal spillover to the level of the valleculae and minimal passive overflow along the posterior aspect of the epiglottis Maximal spillover to the valleculae. Oral transit time noted to be 6.9 seconds. Maximal spillover to the level of the valleculae and mild passive overflow to the pyriform sinuses.  There was residue noted in the observed portion of the oral cavity (unclear as to the degree . Maximal spillover to the level of the valleculae and minimal passive overflow into the hypopharynx Maximal spillover to the level of the valleculae and moderate passive overflow to the pyriform sinuses

## 2018-08-31 NOTE — PROGRESS NOTE ADULT - SUBJECTIVE AND OBJECTIVE BOX
Patient is a 89y old  Female who presents with a chief complaint of difficulty swallowing, weight loss (29 Aug 2018 21:38)        SUBJECTIVE / OVERNIGHT EVENTS:      MEDICATIONS  (STANDING):  amLODIPine   Tablet 10 milliGRAM(s) Oral daily  dextrose 5% + sodium chloride 0.45%. 1000 milliLiter(s) (70 mL/Hr) IV Continuous <Continuous>  docusate sodium 100 milliGRAM(s) Oral daily  heparin  Injectable 5000 Unit(s) SubCutaneous every 8 hours  senna 2 Tablet(s) Oral at bedtime    MEDICATIONS  (PRN):  polyethylene glycol 3350 17 Gram(s) Oral daily PRN Constipation  simethicone 80 milliGRAM(s) Chew three times a day PRN Gas      Allergies    bacitracin (Unknown)  clindamycin (Unknown)  Desoximetasone (Unknown)  penicillin (Unknown)  vancomycin (Unknown)  walnut (Unknown)    Intolerances          Vital Signs Last 24 Hrs  T(C): 36.3 (31 Aug 2018 05:43), Max: 36.8 (30 Aug 2018 22:10)  T(F): 97.3 (31 Aug 2018 05:43), Max: 98.2 (30 Aug 2018 22:10)  HR: 64 (31 Aug 2018 05:43) (62 - 78)  BP: 136/74 (31 Aug 2018 05:43) (136/74 - 177/87)  BP(mean): --  RR: 18 (31 Aug 2018 05:43) (16 - 18)  SpO2: 97% (31 Aug 2018 05:43) (92% - 98%)  CAPILLARY BLOOD GLUCOSE        I&O's Summary    30 Aug 2018 07:01  -  31 Aug 2018 07:00  --------------------------------------------------------  IN: 980 mL / OUT: 400 mL / NET: 580 mL          PHYSICAL EXAM:  GENERAL: NAD, well-developed  HEAD:  AT, NC  EYES: EOMI, PERRLA, conjunctiva and sclera clear  ENMT: Airway patent. MMM. Good dentition, no lesions.  NECK: Supple, No JVD  CHEST/LUNG: CTABL; No wheezing, rhonci, or rales  HEART: RRR; Normal S1, S2. No murmurs, rubs, or gallops  ABDOMEN: Soft, NT, ND; Bowel sounds present. No organomegaly  EXTREMITIES:  2+ Peripheral Pulses, No clubbing, cyanosis, or edema  PSYCH: AAOx3  NEUROLOGY: non-focal  SKIN: Warm, dry, intact; No rashes or lesions      LABS:                        15.3   7.1   )-----------( 230      ( 31 Aug 2018 06:51 )             47.9         140  |  102  |  6<L>  ----------------------------<  117<H>  3.3<L>   |  26  |  0.62    Ca    9.4      31 Aug 2018 06:48  Phos  3.2       Mg     2.3         TPro  6.8  /  Alb  3.8  /  TBili  0.4  /  DBili  x   /  AST  17  /  ALT  10  /  AlkPhos  66      LIVER FUNCTIONS - ( 31 Aug 2018 06:48 )  Alb: 3.8 g/dL / Pro: 6.8 g/dL / ALK PHOS: 66 U/L / ALT: 10 U/L / AST: 17 U/L / GGT: x           PT/INR - ( 29 Aug 2018 18:42 )   PT: 11.5 sec;   INR: 1.06 ratio         PTT - ( 29 Aug 2018 18:42 )  PTT:26.7 sec      Urinalysis Basic - ( 29 Aug 2018 18:55 )    Color: Colorless / Appearance: Clear / S.008 / pH: x  Gluc: x / Ketone: Negative  / Bili: Negative / Urobili: Negative   Blood: x / Protein: Negative / Nitrite: Negative   Leuk Esterase: Negative / RBC: x / WBC x   Sq Epi: x / Non Sq Epi: x / Bacteria: x          Culture - Urine (collected 29 Aug 2018 20:54)  Source: .Urine Catheterized  Final Report (30 Aug 2018 21:15):    Culture grew 3 or more types of organisms which indicate    collection contamination; consider recollection only if clinically    indicated.          RADIOLOGY & ADDITIONAL TESTS:    Imaging Personally Reviewed:     Consultant(s) Notes Reviewed:     Care Discussed with Consultants/Other Providers: Patient is a 89y old  Female who presents with a chief complaint of difficulty swallowing, weight loss (29 Aug 2018 21:38)        SUBJECTIVE / OVERNIGHT EVENTS: No complaints, just a lot of mucus production      MEDICATIONS  (STANDING):  amLODIPine   Tablet 10 milliGRAM(s) Oral daily  dextrose 5% + sodium chloride 0.45%. 1000 milliLiter(s) (70 mL/Hr) IV Continuous <Continuous>  docusate sodium 100 milliGRAM(s) Oral daily  heparin  Injectable 5000 Unit(s) SubCutaneous every 8 hours  senna 2 Tablet(s) Oral at bedtime    MEDICATIONS  (PRN):  polyethylene glycol 3350 17 Gram(s) Oral daily PRN Constipation  simethicone 80 milliGRAM(s) Chew three times a day PRN Gas      Allergies    bacitracin (Unknown)  clindamycin (Unknown)  Desoximetasone (Unknown)  penicillin (Unknown)  vancomycin (Unknown)  walnut (Unknown)    Intolerances          Vital Signs Last 24 Hrs  T(C): 36.3 (31 Aug 2018 05:43), Max: 36.8 (30 Aug 2018 22:10)  T(F): 97.3 (31 Aug 2018 05:43), Max: 98.2 (30 Aug 2018 22:10)  HR: 64 (31 Aug 2018 05:43) (62 - 78)  BP: 136/74 (31 Aug 2018 05:43) (136/74 - 177/87)  BP(mean): --  RR: 18 (31 Aug 2018 05:43) (16 - 18)  SpO2: 97% (31 Aug 2018 05:43) (92% - 98%)  CAPILLARY BLOOD GLUCOSE        I&O's Summary    30 Aug 2018 07:01  -  31 Aug 2018 07:00  --------------------------------------------------------  IN: 980 mL / OUT: 400 mL / NET: 580 mL          PHYSICAL EXAM:  GENERAL: NAD, cachetic  HEAD:  AT, NC  EYES: EOMI  ENMT: Airway patent. MMM.  CHEST/LUNG: CTABL; No wheezing, rhonci, or rales  HEART: RRR; Normal S1, S2. No murmurs  ABDOMEN: Soft, NT, ND  EXTREMITIES:  2+ Peripheral Pulses, No clubbing, cyanosis, or edema      LABS:                        15.3   7.1   )-----------( 230      ( 31 Aug 2018 06:51 )             47.9         140  |  102  |  6<L>  ----------------------------<  117<H>  3.3<L>   |  26  |  0.62    Ca    9.4      31 Aug 2018 06:48  Phos  3.2       Mg     2.3         TPro  6.8  /  Alb  3.8  /  TBili  0.4  /  DBili  x   /  AST  17  /  ALT  10  /  AlkPhos  66      LIVER FUNCTIONS - ( 31 Aug 2018 06:48 )  Alb: 3.8 g/dL / Pro: 6.8 g/dL / ALK PHOS: 66 U/L / ALT: 10 U/L / AST: 17 U/L / GGT: x           PT/INR - ( 29 Aug 2018 18:42 )   PT: 11.5 sec;   INR: 1.06 ratio         PTT - ( 29 Aug 2018 18:42 )  PTT:26.7 sec      Urinalysis Basic - ( 29 Aug 2018 18:55 )    Color: Colorless / Appearance: Clear / S.008 / pH: x  Gluc: x / Ketone: Negative  / Bili: Negative / Urobili: Negative   Blood: x / Protein: Negative / Nitrite: Negative   Leuk Esterase: Negative / RBC: x / WBC x   Sq Epi: x / Non Sq Epi: x / Bacteria: x          Culture - Urine (collected 29 Aug 2018 20:54)  Source: .Urine Catheterized  Final Report (30 Aug 2018 21:15):    Culture grew 3 or more types of organisms which indicate    collection contamination; consider recollection only if clinically    indicated.          RADIOLOGY & ADDITIONAL TESTS:    Imaging Personally Reviewed:     Consultant(s) Notes Reviewed:     Care Discussed with Consultants/Other Providers: Patient is a 89y old  Female who presents with a chief complaint of difficulty swallowing, weight loss (29 Aug 2018 21:38)        SUBJECTIVE / OVERNIGHT EVENTS: No complaints, just a lot of mucus production        ROS        :   Constitutional    : No fever , no chills , pos weight loss   Cardiovascular  :  NO chest pain , no palpitations  Respiratory       : pos occasional cough , no SOB, improved   Gastrointestinal :   pos increased mucous production and GI regurgitation as per patient.  NO diarrhea, no constipation , pos improved abd discomfort .    Musculoskeletal :  no current joint pain   Neurological     :  no change in mentation   Psychiatric         :  No depression and anxiety       MEDICATIONS  (STANDING):  amLODIPine   Tablet 10 milliGRAM(s) Oral daily  dextrose 5% + sodium chloride 0.45%. 1000 milliLiter(s) (70 mL/Hr) IV Continuous <Continuous>  docusate sodium 100 milliGRAM(s) Oral daily  heparin  Injectable 5000 Unit(s) SubCutaneous every 8 hours  senna 2 Tablet(s) Oral at bedtime    MEDICATIONS  (PRN):  polyethylene glycol 3350 17 Gram(s) Oral daily PRN Constipation  simethicone 80 milliGRAM(s) Chew three times a day PRN Gas      Allergies    bacitracin (Unknown)  clindamycin (Unknown)  Desoximetasone (Unknown)  penicillin (Unknown)  vancomycin (Unknown)  walnut (Unknown)    Intolerances          Vital Signs Last 24 Hrs  T(C): 36.3 (31 Aug 2018 05:43), Max: 36.8 (30 Aug 2018 22:10)  T(F): 97.3 (31 Aug 2018 05:43), Max: 98.2 (30 Aug 2018 22:10)  HR: 64 (31 Aug 2018 05:43) (62 - 78)  BP: 136/74 (31 Aug 2018 05:43) (136/74 - 177/87)  BP(mean): --  RR: 18 (31 Aug 2018 05:43) (16 - 18)  SpO2: 97% (31 Aug 2018 05:43) (92% - 98%)  CAPILLARY BLOOD GLUCOSE        I&O's Summary    30 Aug 2018 07:01  -  31 Aug 2018 07:00  --------------------------------------------------------  IN: 980 mL / OUT: 400 mL / NET: 580 mL          PHYSICAL EXAM:  GENERAL: NAD, cachetic  HEAD:  AT, NC  EYES: EOMI  ENMT: Airway patent. MMM.  CHEST/LUNG: CTABL; No wheezing, rhonci, or rales  HEART: RRR; Normal S1, S2. No murmurs  ABDOMEN: Soft, NT, ND  EXTREMITIES:  2+ Peripheral Pulses, No clubbing, cyanosis, or edema, thin and dry skin       LABS:                        15.3   7.1   )-----------( 230      ( 31 Aug 2018 06:51 )             47.9     -    140  |  102  |  6<L>  ----------------------------<  117<H>  3.3<L>   |  26  |  0.62    Ca    9.4      31 Aug 2018 06:48  Phos  3.2       Mg     2.3         TPro  6.8  /  Alb  3.8  /  TBili  0.4  /  DBili  x   /  AST  17  /  ALT  10  /  AlkPhos  66  -    LIVER FUNCTIONS - ( 31 Aug 2018 06:48 )  Alb: 3.8 g/dL / Pro: 6.8 g/dL / ALK PHOS: 66 U/L / ALT: 10 U/L / AST: 17 U/L / GGT: x           PT/INR - ( 29 Aug 2018 18:42 )   PT: 11.5 sec;   INR: 1.06 ratio         PTT - ( 29 Aug 2018 18:42 )  PTT:26.7 sec      Urinalysis Basic - ( 29 Aug 2018 18:55 )    Color: Colorless / Appearance: Clear / S.008 / pH: x  Gluc: x / Ketone: Negative  / Bili: Negative / Urobili: Negative   Blood: x / Protein: Negative / Nitrite: Negative   Leuk Esterase: Negative / RBC: x / WBC x   Sq Epi: x / Non Sq Epi: x / Bacteria: x          Culture - Urine (collected 29 Aug 2018 20:54)  Source: .Urine Catheterized  Final Report (30 Aug 2018 21:15):    Culture grew 3 or more types of organisms which indicate    collection contamination; consider recollection only if clinically    indicated.          RADIOLOGY & ADDITIONAL TESTS:    Imaging Personally Reviewed:     Consultant(s) Notes Reviewed:     Care Discussed with Consultants/Other Providers:

## 2018-08-31 NOTE — PHYSICAL THERAPY INITIAL EVALUATION ADULT - GAIT DEVIATIONS NOTED, PT EVAL
decreased step length/decreased stride length/decreased may/increased time in double stance/decreased weight-shifting ability/decreased velocity of limb motion

## 2018-08-31 NOTE — DIETITIAN INITIAL EVALUATION ADULT. - NS AS NUTRI INTERV ENTERAL NUTRITION
If enteral nutrition to be initiated, recommend Jevity 1.2 @ 20mL/hr increase by 10mL Q6 hrs until goal rate of 45mL/hr x 24hrs. Goal provides 1296kcal, 60g protein (~27kcal/kg, 1.2g protein/kg)/Rate/Composition/Volume/Schedule/Concentration

## 2018-08-31 NOTE — SWALLOW VFSS/MBS ASSESSMENT ADULT - DIAGNOSTIC IMPRESSIONS
Patient presents with mild  renay-pharyngo-cervical esophageal dysphagia with impaired oral management, delay in trigger of the swallow reflex, trace inconsistent laryngeal penetration of thin liquids (when consumed in uncontrolled volumes ), post swallow pharyngeal residue (in the vallecular space) and minimal inconsistent cervical esophageal stasis. Patient presents with mild  renay-pharyngo-cervical esophageal dysphagia with impaired oral management, delay in trigger of the swallow reflex, trace inconsistent laryngeal penetration of thin liquids (when consumed in uncontrolled volumes ), post swallow pharyngeal residue (in the vallecular space) and minimal inconsistent cervical esophageal stasis. Swallow strategies were employed to compensate for areas of deficit.

## 2018-08-31 NOTE — SWALLOW VFSS/MBS ASSESSMENT ADULT - ESOPHAGEAL STAGE
There was evidence of air distention in the distal cervical esophagus post swallow. Post swallow air distention Trace residue in the cervical esophagus Evidence of air distention Passed through the visualized portion of the cervical and thoracic esophagus without issue

## 2018-08-31 NOTE — PHYSICAL THERAPY INITIAL EVALUATION ADULT - PERTINENT HX OF CURRENT PROBLEM, REHAB EVAL
Pt is 89F admitted 8/29/18 PMHx HTN, CAD s/p stent, PAD, gout, p/w dysphagia, weight loss & difficulty ambulating.  Pt s/p fall in January & discharged to rehab.

## 2018-08-31 NOTE — DIETITIAN INITIAL EVALUATION ADULT. - OTHER INFO
Nutrition consult for BMI <18kg/m^2. Per EMR, Pt weighed 130lbs on 1/17/18, current dosing Wt is 107lbs. Pt failed a bedside swallow evaluation yesterday and is pending an MBS. Pt c/o persistent phlegm production and choking on own secretions. Denies constipation/diarrhea. Pt states if po diet is initiated, she would like strawberry ensure shakes.

## 2018-08-31 NOTE — CHART NOTE - NSCHARTNOTEFT_GEN_A_CORE
Upon Nutritional Assessment by the Registered Dietitian your patient was determined to meet criteria / has evidence of the following diagnosis/diagnoses:          [ ]  Mild Protein Calorie Malnutrition        [ ]  Moderate Protein Calorie Malnutrition        [x] Severe Protein Calorie Malnutrition        [ ] Unspecified Protein Calorie Malnutrition        [x] Underweight / BMI <19        [ ] Morbid Obesity / BMI > 40      Findings as based on:  [x] Comprehensive nutrition assessment   [x] Nutrition Focused Physical Exam  [x] Other: Pt with severe muscle at temples, clavicles, deltoids, interosseous regions, and severe fat wasting at ribs, triceps and orbital regions, unintentional 18% Wt loss in 6 months, poor po intake PTA      Nutrition Plan/Recommendations:    Resume home vitamin B12 and Multivitamin that Pt and friend reports taking  Defer nutritional route to Pt wishes. If PO diet initiated, defer consistency to SLP and provide Ensure Enlive twice daily- thickened to recommended consistency (provides additional 700kcal, 40g protein)  If enteral nutrition to be initiated, recommend Jevity 1.2 @ 25mL/hr increase by 10mL Q6 hrs until goal rate of 45mL/hr x 24hrs. Goal provides 1296kcal, 60g protein (~27kcal/kg, 1.2g protein/kg)          PROVIDER Section:     By signing this assessment you are acknowledging and agree with the diagnosis/diagnoses assigned by the Registered Dietitian    Comments:

## 2018-08-31 NOTE — DIETITIAN INITIAL EVALUATION ADULT. - NUTRITION INTERVENTION
Medical Food Supplements/Collaboration and Referral of Nutrition Care/Vitamin/Meals and Snack/Enteral Nutrition

## 2018-08-31 NOTE — PROGRESS NOTE ADULT - PROBLEM SELECTOR PLAN 1
- S+S - pending MBS today  Start IVF - S+S   - MBS done, recommending soft diet  - started a diet - S+S   - MBS done, recommending soft diet  - started a diet  Trial of PPI

## 2018-08-31 NOTE — SWALLOW VFSS/MBS ASSESSMENT ADULT - ORAL PHASE
Uncontrolled bolus / spillover in renay-pharynx/Reduced anterior - posterior transport Uncontrolled bolus / spillover in renay-pharynx Residue in oral cavity/Uncontrolled bolus / spillover in renay-pharynx/Delayed oral transit time/Reduced anterior - posterior transport Uncontrolled bolus / spillover in hypopharynx/Uncontrolled bolus / spillover in renay-pharynx

## 2018-08-31 NOTE — DIETITIAN INITIAL EVALUATION ADULT. - NS AS NUTRI INTERV MEDICAL AND FOOD SUPPLEMENTS
If PO diet initiated, Provide Ensure Enlive twice daily- thickened to recommended consistency (provides additional 700kcal, 40g protein)/Commercial beverage

## 2018-08-31 NOTE — DIETITIAN INITIAL EVALUATION ADULT. - ADHERENCE
No dietary restrictions PTA. Confirms NKFA, states she is not allergic to walnuts. Pt states she took a MVI, vitamin B12 and went to a holistic doctor twice per week for IV hydration and vitamin administration./n/a

## 2018-08-31 NOTE — PROGRESS NOTE ADULT - PROBLEM SELECTOR PLAN 2
Likely due to underlying dysphagia and inability to tolerate PO  Patient lives with her sister who is currently in [facility] for [facility]ANIE - needs SW eval for safe discharge plan  Await PT evaluation for possibility of TIFFANIE  await RPR, vitamin D level Likely due to underlying dysphagia and inability to tolerate PO  Patient lives with her sister who is currently in [facility] for [facility]AN[facility]   - needs SW eval for safe discharge plan  Await PT evaluation for possibility of TIFFANIE  RPR and Kellie D neg  - possible neuro consult? consistent picture for normal pressure hydrocephalus, but possible outpatient

## 2018-08-31 NOTE — DIETITIAN INITIAL EVALUATION ADULT. - ORAL INTAKE PTA
PO intake has decreased greatly over the past several months, states she can tolerate regular foods but would have a lot of phlegm and choke after drinking liquids. States she was able to tolerate ensure shakes PTA./poor

## 2018-08-31 NOTE — DIETITIAN INITIAL EVALUATION ADULT. - ENERGY NEEDS
ht: 64 inches, wt: 107 pounds, BMI: 18.4 kg/m2, IBW: 120 pounds (+/- 10%), 89 %IBW  Edema: none noted. Skin: intact.  Other pertinent information: 89 yo female with PMH of HTN, CAD s/p stent, PAD, gout, presents here with dysphagia, weight loss and difficulty ambulating. Appears to have progressive decline in functional status.  Pending MBS.

## 2018-08-31 NOTE — SWALLOW VFSS/MBS ASSESSMENT ADULT - RECOMMENDED CONSISTENCY
Soft diet. MD/team Please enter the following as provider to RN orders : 1) Full assist with meals, 2) Provide pills with puree, 3) Provide small single bites and sips at slow rate 4) Encourage successive swallows for oral and pharyngeal clearance 5) Alternate food and liquids to aid in oral and pharyngeal clearance 6) Aspiration precautions. Monitor for s/s aspiration/laryngeal penetration. If noted:  D/C p.o. intake, provide non-oral nutrition/hydration/meds Soft diet. MD/team Please enter the following as provider to RN/RD orders : 1) Pt to avoid mixed textures 1a)Full assist with meals, 2) Provide pills with puree, 3) Provide small single bites and sips at slow rate 4) Encourage successive swallows for oral and pharyngeal clearance 5) Alternate food and liquids to aid in oral and pharyngeal clearance 6) Aspiration precautions. Monitor for s/s aspiration/laryngeal penetration. If noted:  D/C p.o. intake, provide non-oral nutrition/hydration/meds

## 2018-08-31 NOTE — PHYSICAL THERAPY INITIAL EVALUATION ADULT - ADDITIONAL COMMENTS
Pt resides in apt with sister, about 4 steps to enter with handrail, one level within. Pt ambulatory with RW in the past 3 weeks, previously was ambulatory without AD.

## 2018-08-31 NOTE — PHYSICAL THERAPY INITIAL EVALUATION ADULT - PRECAUTIONS/LIMITATIONS, REHAB EVAL
: No change from 1/16/2018, volume loss and microvascular disease, no acute hemorrhage or midline shift, if symptoms persist consider follow-up CT or MRI if no contraindications. XRAY CHEST: clear lungs./fall precautions

## 2018-08-31 NOTE — PHYSICAL THERAPY INITIAL EVALUATION ADULT - GENERAL OBSERVATIONS, REHAB EVAL
Pt received semisupine in bed, A&Ox1-2(self/place), confused at times, following simple commands, friend at bedside, willing to participate, emotional & tearful at times t/o

## 2018-08-31 NOTE — SWALLOW VFSS/MBS ASSESSMENT ADULT - ADDITIONAL INFORMATION
Calcification of cricoid/arytenoid region. Presence of small anterior cervical osteophytes.     Disorders:  suspicion of reduced lingual strength/ROM/Rate of motion, reduced BOT to posterior pharyngeal wall contact with a narrow column of contrast between structures, delay in trigger of the swallow reflex with low trigger points, reduced anterior hyoid excursion, reduced laryngeal elevation, reduced supraglottic sensation, small CP bar Etiology of esophageal dysphagia is unclear - suggest GI consult for assessment. Calcification of cricoid/arytenoid region. Presence of small anterior cervical osteophytes. L large hyperdensity adjacent to esophagus - as per radiology, this is aortic arch     Disorders:  suspicion of reduced lingual strength/ROM/Rate of motion, reduced BOT to posterior pharyngeal wall contact with a narrow column of contrast between structures, delay in trigger of the swallow reflex with low trigger points, reduced anterior hyoid excursion, reduced laryngeal elevation, reduced supraglottic sensation, small CP bar Etiology of esophageal dysphagia is unclear - suggest GI consult for assessment.

## 2018-08-31 NOTE — DIETITIAN INITIAL EVALUATION ADULT. - PHYSICAL APPEARANCE
emaciated/underweight/Nutrition Focused Physical Exam performed, Pt with severe muscle wasting at temples, clavicles, deltoids, interosseous regions. Severe fat loss at triceps, ribs and orbital regions.

## 2018-09-01 ENCOUNTER — TRANSCRIPTION ENCOUNTER (OUTPATIENT)
Age: 83
End: 2018-09-01

## 2018-09-01 LAB
ALBUMIN SERPL ELPH-MCNC: 3.3 G/DL — SIGNIFICANT CHANGE UP (ref 3.3–5)
ALP SERPL-CCNC: 56 U/L — SIGNIFICANT CHANGE UP (ref 40–120)
ALT FLD-CCNC: 9 U/L — LOW (ref 10–45)
ANION GAP SERPL CALC-SCNC: 12 MMOL/L — SIGNIFICANT CHANGE UP (ref 5–17)
AST SERPL-CCNC: 13 U/L — SIGNIFICANT CHANGE UP (ref 10–40)
BILIRUB SERPL-MCNC: 0.4 MG/DL — SIGNIFICANT CHANGE UP (ref 0.2–1.2)
BUN SERPL-MCNC: 12 MG/DL — SIGNIFICANT CHANGE UP (ref 7–23)
CALCIUM SERPL-MCNC: 9 MG/DL — SIGNIFICANT CHANGE UP (ref 8.4–10.5)
CHLORIDE SERPL-SCNC: 106 MMOL/L — SIGNIFICANT CHANGE UP (ref 96–108)
CO2 SERPL-SCNC: 25 MMOL/L — SIGNIFICANT CHANGE UP (ref 22–31)
CREAT SERPL-MCNC: 0.74 MG/DL — SIGNIFICANT CHANGE UP (ref 0.5–1.3)
GLUCOSE SERPL-MCNC: 107 MG/DL — HIGH (ref 70–99)
HCT VFR BLD CALC: 42.8 % — SIGNIFICANT CHANGE UP (ref 34.5–45)
HGB BLD-MCNC: 14 G/DL — SIGNIFICANT CHANGE UP (ref 11.5–15.5)
MAGNESIUM SERPL-MCNC: 2.2 MG/DL — SIGNIFICANT CHANGE UP (ref 1.6–2.6)
MCHC RBC-ENTMCNC: 30.5 PG — SIGNIFICANT CHANGE UP (ref 27–34)
MCHC RBC-ENTMCNC: 32.7 GM/DL — SIGNIFICANT CHANGE UP (ref 32–36)
MCV RBC AUTO: 93.4 FL — SIGNIFICANT CHANGE UP (ref 80–100)
PHOSPHATE SERPL-MCNC: 3.2 MG/DL — SIGNIFICANT CHANGE UP (ref 2.5–4.5)
PLATELET # BLD AUTO: 216 K/UL — SIGNIFICANT CHANGE UP (ref 150–400)
POTASSIUM SERPL-MCNC: 3.4 MMOL/L — LOW (ref 3.5–5.3)
POTASSIUM SERPL-SCNC: 3.4 MMOL/L — LOW (ref 3.5–5.3)
PROT SERPL-MCNC: 6 G/DL — SIGNIFICANT CHANGE UP (ref 6–8.3)
RBC # BLD: 4.58 M/UL — SIGNIFICANT CHANGE UP (ref 3.8–5.2)
RBC # FLD: 14.4 % — SIGNIFICANT CHANGE UP (ref 10.3–14.5)
SODIUM SERPL-SCNC: 143 MMOL/L — SIGNIFICANT CHANGE UP (ref 135–145)
WBC # BLD: 7.4 K/UL — SIGNIFICANT CHANGE UP (ref 3.8–10.5)
WBC # FLD AUTO: 7.4 K/UL — SIGNIFICANT CHANGE UP (ref 3.8–10.5)

## 2018-09-01 PROCEDURE — 99232 SBSQ HOSP IP/OBS MODERATE 35: CPT | Mod: GC

## 2018-09-01 RX ORDER — PETROLATUM,WHITE
1 JELLY (GRAM) TOPICAL
Qty: 0 | Refills: 0 | Status: DISCONTINUED | OUTPATIENT
Start: 2018-09-01 | End: 2018-09-06

## 2018-09-01 RX ORDER — POTASSIUM CHLORIDE 20 MEQ
40 PACKET (EA) ORAL ONCE
Qty: 0 | Refills: 0 | Status: COMPLETED | OUTPATIENT
Start: 2018-09-01 | End: 2018-09-01

## 2018-09-01 RX ADMIN — Medication 40 MILLIEQUIVALENT(S): at 15:01

## 2018-09-01 RX ADMIN — Medication 40 MILLIEQUIVALENT(S): at 09:32

## 2018-09-01 RX ADMIN — HEPARIN SODIUM 5000 UNIT(S): 5000 INJECTION INTRAVENOUS; SUBCUTANEOUS at 21:33

## 2018-09-01 RX ADMIN — SODIUM CHLORIDE 70 MILLILITER(S): 9 INJECTION, SOLUTION INTRAVENOUS at 02:30

## 2018-09-01 RX ADMIN — SODIUM CHLORIDE 70 MILLILITER(S): 9 INJECTION, SOLUTION INTRAVENOUS at 04:30

## 2018-09-01 RX ADMIN — HEPARIN SODIUM 5000 UNIT(S): 5000 INJECTION INTRAVENOUS; SUBCUTANEOUS at 09:31

## 2018-09-01 RX ADMIN — Medication 100 MILLIGRAM(S): at 14:49

## 2018-09-01 RX ADMIN — PANTOPRAZOLE SODIUM 40 MILLIGRAM(S): 20 TABLET, DELAYED RELEASE ORAL at 09:31

## 2018-09-01 RX ADMIN — SENNA PLUS 2 TABLET(S): 8.6 TABLET ORAL at 21:33

## 2018-09-01 RX ADMIN — HEPARIN SODIUM 5000 UNIT(S): 5000 INJECTION INTRAVENOUS; SUBCUTANEOUS at 14:48

## 2018-09-01 RX ADMIN — AMLODIPINE BESYLATE 10 MILLIGRAM(S): 2.5 TABLET ORAL at 09:31

## 2018-09-01 RX ADMIN — SODIUM CHLORIDE 70 MILLILITER(S): 9 INJECTION, SOLUTION INTRAVENOUS at 15:02

## 2018-09-01 NOTE — DISCHARGE NOTE ADULT - HOSPITAL COURSE
Patient is a 89 year old female with a PMHx of HTN, CAD (S/P stent), PAD and gout who presented with dysphagia, weight loss and difficulty ambulating. On admission, she was hypertensive, and was given hydralazine. She was tearful on admission, and dementia work up was negative, CT head was negative, xray was clear, BC was negative. She was sent for a modified barium swallow study consistent with dysphagia, and was put on PPI trial. PT saw her and recommneded _____________ She responded well to PPI Patient is a 89 year old female with a PMHx of HTN, CAD (S/P stent), PAD and gout who presented with dysphagia, weight loss and difficulty ambulating. She was tearful on admission, and dementia work up was negative, She had a CT head done, which was negative. Chest xray was clear and blood cultures were negative. She was sent for a modified barium swallow study consistent with dysphagia. She was recommended a soft diet and was put on PPI trial for waterbrash, to which she responded well. PT evaluated her and recommend bed mobility training, gait training, transfer training. Will need continued PT at rehab. Because pt lost a lot of weight, there was concern for underlying malignancy. CT C/A/P did not reveal anything concerning for malignancy. Given pt with improving dysphagia & PO intake, stable for discharge to [facility]. Will need close f/u with GI. Patient is a 89 year old female with a PMHx of HTN, CAD (S/P stent), PAD and gout who presented with dysphagia, weight loss and difficulty ambulating. She was tearful on admission, and dementia work up was negative, She had a CT head done, which was negative. Chest xray was clear and blood cultures were negative. She was sent for a modified barium swallow study consistent with dysphagia. She was recommended a soft diet and was put on PPI trial for waterbrash, to which she responded well. PT evaluated her and recommend bed mobility training, gait training, transfer training. Will need continued PT at rehab. Because pt lost a lot of weight, there was concern for underlying malignancy. CT C/A/P negative for masses concerning for malignancy which was a concern given rapid weight loss. Given pt with improving dysphagia & PO intake, stable for discharge to [facility]. Patient and HCP had elected only for CT in the hospital and patient will follow up outpatient for GI follow up. Patient is a 89 year old female with a PMHx of HTN, CAD (S/P stent), PAD and gout who presented with dysphagia, weight loss and difficulty ambulating. She was tearful on admission, and dementia work up was negative, She had a CT head done, which was negative. Chest xray was clear and blood cultures were negative. She was sent for a modified barium swallow study consistent with dysphagia. She was recommended a soft diet and was put on PPI trial for waterbrash, to which she responded well. PT evaluated her and recommend bed mobility training, gait training, transfer training. Will need continued PT at rehab. Because pt lost a lot of weight, there was concern for underlying malignancy. CT C/A/P negative for masses concerning for malignancy which was a concern given rapid weight loss. It did indicate a small aortic aneurysm for which pt was advised to follow up with PMD for serial monitoring. Given pt with improving dysphagia & PO intake, stable for discharge to [facility]. Patient and HCP had elected only for CT in the hospital and patient will follow up outpatient for GI follow up.

## 2018-09-01 NOTE — DISCHARGE NOTE ADULT - CONTRAINDICATIONS & PRECAUTIONS (SELECT ALL THAT APPLY)
Severe allergy/sensitivity to eggs/thimerosal/other vaccine components or previous serious reaction to vaccine

## 2018-09-01 NOTE — PROGRESS NOTE ADULT - PROBLEM SELECTOR PLAN 1
- S+S   - MBS done, recommending soft diet  - started a diet  -Trial of PPI - S+S   - MBS done, recommending soft diet  - started a diet  -Trial of PPI, working - S+S   - MBS done, recommending soft diet  - started a diet  -Trial of PPI, working for her now  - f/u GI outpatient - S+S   - MBS done, recommending soft diet  -responding well to trial of PPI  - f/u GI outpatient

## 2018-09-01 NOTE — DISCHARGE NOTE ADULT - PLAN OF CARE
Discharge to subacute rehabilitation You were admitted to the hospital because you were having difficulty eating, losing weight, and difficulty walking. Because you lost so much weight in the past month, we were worried about an underlying malignancy. We did a CT scan of your chest, abdomen and pelvis which was largely negative for such finding. You have been doing better with your diet when having soft foods, so please continue with a soft diet. We also recommend that you follow with a GI attending on an outpatient basis. You have been having difficulty swallowing. We did a swallowing study while you were here, which recommends a soft diet. We recommend that you continue having a soft diet, eating your food slowly and with small bites. That has been helping. While in the hospital, you were having some reflux from the esophagus and stomach into your mouth. However, this improved when we started you on pantoprazole. Please continue pantoprazole when you leave. For your high blood pressure, please continue taking amlodipine (Norvasc) 10mg daily While in the hospital, you were having some reflux from the esophagus and stomach into your mouth. However, this improved when we started you on pantoprazole. Please continue pantoprazole when you leave and follow up with your primary care doctor.

## 2018-09-01 NOTE — DISCHARGE NOTE ADULT - PROVIDER TOKENS
FREE:[LAST:[Jones],FIRST:[Aleksandr],PHONE:[phone]],FAX:[phone]    -],ADDRESS:[[address] [place] [address]]]

## 2018-09-01 NOTE — DISCHARGE NOTE ADULT - CARE PLAN
Principal Discharge DX:	Failure to thrive in adult  Goal:	Discharge to subacute rehabilitation  Assessment and plan of treatment:	You were admitted to the hospital because you were having difficulty eating, losing weight, and difficulty walking. Because you lost so much weight in the past month, we were worried about an underlying malignancy. We did a CT scan of your chest, abdomen and pelvis which was largely negative for such finding. You have been doing better with your diet when having soft foods, so please continue with a soft diet. We also recommend that you follow with a GI attending on an outpatient basis.  Secondary Diagnosis:	Dysphagia, unspecified type  Assessment and plan of treatment:	You have been having difficulty swallowing. We did a swallowing study while you were here, which recommends a soft diet. We recommend that you continue having a soft diet, eating your food slowly and with small bites. That has been helping.  Secondary Diagnosis:	Waterbrash  Assessment and plan of treatment:	While in the hospital, you were having some reflux from the esophagus and stomach into your mouth. However, this improved when we started you on pantoprazole. Please continue pantoprazole when you leave.  Secondary Diagnosis:	Hypertension  Assessment and plan of treatment:	For your high blood pressure, please continue taking amlodipine (Norvasc) 10mg daily Principal Discharge DX:	Failure to thrive in adult  Goal:	Discharge to subacute rehabilitation  Assessment and plan of treatment:	You were admitted to the hospital because you were having difficulty eating, losing weight, and difficulty walking. Because you lost so much weight in the past month, we were worried about an underlying malignancy. We did a CT scan of your chest, abdomen and pelvis which was largely negative for such finding. You have been doing better with your diet when having soft foods, so please continue with a soft diet. We also recommend that you follow with a GI attending on an outpatient basis.  Secondary Diagnosis:	Dysphagia, unspecified type  Assessment and plan of treatment:	You have been having difficulty swallowing. We did a swallowing study while you were here, which recommends a soft diet. We recommend that you continue having a soft diet, eating your food slowly and with small bites. That has been helping.  Secondary Diagnosis:	Waterbrash  Assessment and plan of treatment:	While in the hospital, you were having some reflux from the esophagus and stomach into your mouth. However, this improved when we started you on pantoprazole. Please continue pantoprazole when you leave and follow up with your primary care doctor.  Secondary Diagnosis:	Hypertension  Assessment and plan of treatment:	For your high blood pressure, please continue taking amlodipine (Norvasc) 10mg daily

## 2018-09-01 NOTE — PROGRESS NOTE ADULT - PROBLEM SELECTOR PLAN 2
Likely due to underlying dysphagia and inability to tolerate PO  Patient lives with her sister who is currently in [facility] for [facility]AN[facility]   - needs SW eval for safe discharge plan  - PT evaluated- no weight bearing restrictions  RPR and Kellie D neg  - possible neuro consult? consistent picture for normal pressure hydrocephalus, but possible outpatient Likely due to underlying dysphagia and inability to tolerate PO  -RPR and Kellie D neg  -Patient lives with her sister who is currently in [facility] for [facility]   - needs SW eval for safe discharge plan  - PT evaluated- no weight bearing restrictions  - possible neuro consult? consistent picture for normal pressure hydrocephalus, will evaluate outpatient Likely due to underlying dysphagia and inability to tolerate PO  -RPR and Kellie D neg, TSH pending  -Patient lives with her sister who is currently in [facility] for [facility]   - needs SW eval for safe discharge plan  - PT evaluated- no weight bearing restrictions  - possible neuro consult? consistent picture for normal pressure hydrocephalus, will evaluate outpatient  - on soft diet, d/rene fluids as she's eating Likely due to underlying dysphagia and inability to tolerate PO  -RPR and Kellie D neg, TSH pending  -Patient lives with her sister who is currently in [facility] for [facility]   - needs SW eval for safe discharge plan  - PT evaluation pending- no weight bearing restrictions  - consider neuro eval as outpatient for normal pressure hydrocephalus, though does not have all clinical symptoms  - on soft diet, d/rene fluids as she's eating

## 2018-09-01 NOTE — DISCHARGE NOTE ADULT - PATIENT PORTAL LINK FT
You can access the [facility] Patient Portal, offered by [facility], by registering with the following website: [email or web address]

## 2018-09-01 NOTE — PROGRESS NOTE ADULT - SUBJECTIVE AND OBJECTIVE BOX
Patient is a 89y old  Female who presents with a chief complaint of difficulty swallowing, weight loss (29 Aug 2018 21:38)        SUBJECTIVE / OVERNIGHT EVENTS:      MEDICATIONS  (STANDING):  amLODIPine   Tablet 10 milliGRAM(s) Oral daily  dextrose 5% + sodium chloride 0.45%. 1000 milliLiter(s) (70 mL/Hr) IV Continuous <Continuous>  docusate sodium 100 milliGRAM(s) Oral daily  heparin  Injectable 5000 Unit(s) SubCutaneous every 8 hours  pantoprazole    Tablet 40 milliGRAM(s) Oral before breakfast  potassium chloride   Powder 40 milliEquivalent(s) Oral once  senna 2 Tablet(s) Oral at bedtime    MEDICATIONS  (PRN):  polyethylene glycol 3350 17 Gram(s) Oral daily PRN Constipation  simethicone 80 milliGRAM(s) Chew three times a day PRN Gas      Allergies    bacitracin (Unknown)  clindamycin (Unknown)  Desoximetasone (Unknown)  penicillin (Unknown)  vancomycin (Unknown)  walnut (Unknown)    Intolerances          Vital Signs Last 24 Hrs  T(C): 36.4 (01 Sep 2018 04:07), Max: 36.7 (31 Aug 2018 13:33)  T(F): 97.5 (01 Sep 2018 04:07), Max: 98.1 (31 Aug 2018 13:33)  HR: 67 (01 Sep 2018 04:07) (67 - 76)  BP: 142/69 (01 Sep 2018 04:07) (127/71 - 150/69)  BP(mean): --  RR: 18 (01 Sep 2018 04:07) (18 - 18)  SpO2: 90% (01 Sep 2018 04:07) (90% - 97%)  CAPILLARY BLOOD GLUCOSE        I&O's Summary        PHYSICAL EXAM:  GENERAL: NAD, well-developed  HEAD:  AT, NC  EYES: EOMI, PERRLA, conjunctiva and sclera clear  ENMT: Airway patent. MMM. Good dentition, no lesions.  NECK: Supple, No JVD  CHEST/LUNG: CTABL; No wheezing, rhonci, or rales  HEART: RRR; Normal S1, S2. No murmurs, rubs, or gallops  ABDOMEN: Soft, NT, ND; Bowel sounds present. No organomegaly  EXTREMITIES:  2+ Peripheral Pulses, No clubbing, cyanosis, or edema  PSYCH: AAOx3  NEUROLOGY: non-focal  SKIN: Warm, dry, intact; No rashes or lesions      LABS:                        15.3   7.1   )-----------( 230      ( 31 Aug 2018 06:51 )             47.9     08-31    140  |  102  |  6<L>  ----------------------------<  117<H>  3.3<L>   |  26  |  0.62    Ca    9.4      31 Aug 2018 06:48  Phos  3.2     08-31  Mg     2.3     08-31    TPro  6.8  /  Alb  3.8  /  TBili  0.4  /  DBili  x   /  AST  17  /  ALT  10  /  AlkPhos  66  08-31    LIVER FUNCTIONS - ( 31 Aug 2018 06:48 )  Alb: 3.8 g/dL / Pro: 6.8 g/dL / ALK PHOS: 66 U/L / ALT: 10 U/L / AST: 17 U/L / GGT: x                       Culture - Urine (collected 29 Aug 2018 20:54)  Source: .Urine Catheterized  Final Report (30 Aug 2018 21:15):    Culture grew 3 or more types of organisms which indicate    collection contamination; consider recollection only if clinically    indicated.          RADIOLOGY & ADDITIONAL TESTS:    Imaging Personally Reviewed:     Consultant(s) Notes Reviewed:     Care Discussed with Consultants/Other Providers: Patient is a 89y old  Female who presents with a chief complaint of difficulty swallowing, weight loss (29 Aug 2018 21:38)        SUBJECTIVE / OVERNIGHT EVENTS: Less mucus production overnight, feeling better, slept ok      MEDICATIONS  (STANDING):  amLODIPine   Tablet 10 milliGRAM(s) Oral daily  dextrose 5% + sodium chloride 0.45%. 1000 milliLiter(s) (70 mL/Hr) IV Continuous <Continuous>  docusate sodium 100 milliGRAM(s) Oral daily  heparin  Injectable 5000 Unit(s) SubCutaneous every 8 hours  pantoprazole    Tablet 40 milliGRAM(s) Oral before breakfast  potassium chloride   Powder 40 milliEquivalent(s) Oral once  senna 2 Tablet(s) Oral at bedtime    MEDICATIONS  (PRN):  polyethylene glycol 3350 17 Gram(s) Oral daily PRN Constipation  simethicone 80 milliGRAM(s) Chew three times a day PRN Gas      Allergies    bacitracin (Unknown)  clindamycin (Unknown)  Desoximetasone (Unknown)  penicillin (Unknown)  vancomycin (Unknown)  walnut (Unknown)    Intolerances          Vital Signs Last 24 Hrs  T(C): 36.4 (01 Sep 2018 04:07), Max: 36.7 (31 Aug 2018 13:33)  T(F): 97.5 (01 Sep 2018 04:07), Max: 98.1 (31 Aug 2018 13:33)  HR: 67 (01 Sep 2018 04:07) (67 - 76)  BP: 142/69 (01 Sep 2018 04:07) (127/71 - 150/69)  BP(mean): --  RR: 18 (01 Sep 2018 04:07) (18 - 18)  SpO2: 90% (01 Sep 2018 04:07) (90% - 97%)  CAPILLARY BLOOD GLUCOSE        I&O's Summary        PHYSICAL EXAM:  GENERAL: NAD, cachetic   HEAD:  AT, NC  EYES: EOMI  ENMT: Airway patent. MMM  CHEST/LUNG: CTABL  HEART: RRR; Normal S1, S2. No murmurs  ABDOMEN: Soft, NT, ND; Bowel sounds present  EXTREMITIES:  no edema  PSYCH: AAOx3        LABS:                        15.3   7.1   )-----------( 230      ( 31 Aug 2018 06:51 )             47.9     08-31    140  |  102  |  6<L>  ----------------------------<  117<H>  3.3<L>   |  26  |  0.62    Ca    9.4      31 Aug 2018 06:48  Phos  3.2     08-31  Mg     2.3     08-31    TPro  6.8  /  Alb  3.8  /  TBili  0.4  /  DBili  x   /  AST  17  /  ALT  10  /  AlkPhos  66  08-31    LIVER FUNCTIONS - ( 31 Aug 2018 06:48 )  Alb: 3.8 g/dL / Pro: 6.8 g/dL / ALK PHOS: 66 U/L / ALT: 10 U/L / AST: 17 U/L / GGT: x                       Culture - Urine (collected 29 Aug 2018 20:54)  Source: .Urine Catheterized  Final Report (30 Aug 2018 21:15):    Culture grew 3 or more types of organisms which indicate    collection contamination; consider recollection only if clinically    indicated. Patient is a 89y old  Female who presents with a chief complaint of difficulty swallowing, weight loss (29 Aug 2018 21:38)        SUBJECTIVE / OVERNIGHT EVENTS: Less mucus production overnight, feeling better, slept ok      MEDICATIONS  (STANDING):  amLODIPine   Tablet 10 milliGRAM(s) Oral daily  dextrose 5% + sodium chloride 0.45%. 1000 milliLiter(s) (70 mL/Hr) IV Continuous <Continuous>  docusate sodium 100 milliGRAM(s) Oral daily  heparin  Injectable 5000 Unit(s) SubCutaneous every 8 hours  pantoprazole    Tablet 40 milliGRAM(s) Oral before breakfast  potassium chloride   Powder 40 milliEquivalent(s) Oral once  senna 2 Tablet(s) Oral at bedtime    MEDICATIONS  (PRN):  polyethylene glycol 3350 17 Gram(s) Oral daily PRN Constipation  simethicone 80 milliGRAM(s) Chew three times a day PRN Gas      Allergies    bacitracin (Unknown)  clindamycin (Unknown)  Desoximetasone (Unknown)  penicillin (Unknown)  vancomycin (Unknown)  walnut (Unknown)    Intolerances          Vital Signs Last 24 Hrs  T(C): 36.4 (01 Sep 2018 04:07), Max: 36.7 (31 Aug 2018 13:33)  T(F): 97.5 (01 Sep 2018 04:07), Max: 98.1 (31 Aug 2018 13:33)  HR: 67 (01 Sep 2018 04:07) (67 - 76)  BP: 142/69 (01 Sep 2018 04:07) (127/71 - 150/69)  BP(mean): --  RR: 18 (01 Sep 2018 04:07) (18 - 18)  SpO2: 90% (01 Sep 2018 04:07) (90% - 97%)  CAPILLARY BLOOD GLUCOSE        I&O's Summary        PHYSICAL EXAM:  GENERAL: NAD, cachetic   HEAD:  AT, NC  EYES: EOMI  ENMT: Airway patent. MMM  CHEST/LUNG: CTABL  HEART: RRR; Normal S1, S2. No murmurs  ABDOMEN: Soft, NT, ND; Bowel sounds present  EXTREMITIES:  no edema, dry skin  PSYCH: AAOx3        LABS:                        15.3   7.1   )-----------( 230      ( 31 Aug 2018 06:51 )             47.9     08-31    140  |  102  |  6<L>  ----------------------------<  117<H>  3.3<L>   |  26  |  0.62    Ca    9.4      31 Aug 2018 06:48  Phos  3.2     08-31  Mg     2.3     08-31    TPro  6.8  /  Alb  3.8  /  TBili  0.4  /  DBili  x   /  AST  17  /  ALT  10  /  AlkPhos  66  08-31    LIVER FUNCTIONS - ( 31 Aug 2018 06:48 )  Alb: 3.8 g/dL / Pro: 6.8 g/dL / ALK PHOS: 66 U/L / ALT: 10 U/L / AST: 17 U/L / GGT: x                       Culture - Urine (collected 29 Aug 2018 20:54)  Source: .Urine Catheterized  Final Report (30 Aug 2018 21:15):    Culture grew 3 or more types of organisms which indicate    collection contamination; consider recollection only if clinically    indicated.

## 2018-09-01 NOTE — DISCHARGE NOTE ADULT - ADDITIONAL INSTRUCTIONS
You were found to have a small cm aortic aneurysm. This should be followed with your primary medical doctor. Nothing to do at this time.

## 2018-09-01 NOTE — DISCHARGE NOTE ADULT - MEDICATION SUMMARY - MEDICATIONS TO TAKE
I will START or STAY ON the medications listed below when I get home from the hospital:    acetaminophen 325 mg oral tablet  -- 2 tab(s) by mouth every 6 hours, As needed, Moderate Pain (4 - 6)  -- Indication: For Pain    amLODIPine 5 mg oral tablet  -- 1 tab(s) by mouth once a day    **SEE INTERNAL VARGAS**  -- Indication: For Hypertension    polyethylene glycol 3350 oral powder for reconstitution  -- 17 gram(s) by mouth once a day, As needed, Constipation  -- Indication: For Constipation    senna oral tablet  -- 2 tab(s) by mouth once a day (at bedtime)  -- Indication: For Constipation    simethicone 80 mg oral tablet, chewable  -- 1 tab(s) by mouth 3 times a day, As needed, Gas  -- Indication: For Constipation    pantoprazole 40 mg oral delayed release tablet  -- 1 tab(s) by mouth once a day (before a meal)  -- Indication: For GERD    cholecalciferol oral tablet  -- 1000 unit(s) by mouth once a day  -- Indication: For Supplement

## 2018-09-02 LAB
ANION GAP SERPL CALC-SCNC: 10 MMOL/L — SIGNIFICANT CHANGE UP (ref 5–17)
BUN SERPL-MCNC: 12 MG/DL — SIGNIFICANT CHANGE UP (ref 7–23)
CALCIUM SERPL-MCNC: 9.8 MG/DL — SIGNIFICANT CHANGE UP (ref 8.4–10.5)
CHLORIDE SERPL-SCNC: 104 MMOL/L — SIGNIFICANT CHANGE UP (ref 96–108)
CO2 SERPL-SCNC: 27 MMOL/L — SIGNIFICANT CHANGE UP (ref 22–31)
CREAT SERPL-MCNC: 0.72 MG/DL — SIGNIFICANT CHANGE UP (ref 0.5–1.3)
GLUCOSE SERPL-MCNC: 96 MG/DL — SIGNIFICANT CHANGE UP (ref 70–99)
HCT VFR BLD CALC: 46.8 % — HIGH (ref 34.5–45)
HGB BLD-MCNC: 15.1 G/DL — SIGNIFICANT CHANGE UP (ref 11.5–15.5)
MAGNESIUM SERPL-MCNC: 2.2 MG/DL — SIGNIFICANT CHANGE UP (ref 1.6–2.6)
MCHC RBC-ENTMCNC: 30.3 PG — SIGNIFICANT CHANGE UP (ref 27–34)
MCHC RBC-ENTMCNC: 32.3 GM/DL — SIGNIFICANT CHANGE UP (ref 32–36)
MCV RBC AUTO: 93.9 FL — SIGNIFICANT CHANGE UP (ref 80–100)
PHOSPHATE SERPL-MCNC: 3.2 MG/DL — SIGNIFICANT CHANGE UP (ref 2.5–4.5)
PLATELET # BLD AUTO: 220 K/UL — SIGNIFICANT CHANGE UP (ref 150–400)
POTASSIUM SERPL-MCNC: 3.8 MMOL/L — SIGNIFICANT CHANGE UP (ref 3.5–5.3)
POTASSIUM SERPL-SCNC: 3.8 MMOL/L — SIGNIFICANT CHANGE UP (ref 3.5–5.3)
RBC # BLD: 4.98 M/UL — SIGNIFICANT CHANGE UP (ref 3.8–5.2)
RBC # FLD: 14.6 % — HIGH (ref 10.3–14.5)
SODIUM SERPL-SCNC: 141 MMOL/L — SIGNIFICANT CHANGE UP (ref 135–145)
TSH SERPL-MCNC: 1.75 UIU/ML — SIGNIFICANT CHANGE UP (ref 0.27–4.2)
WBC # BLD: 6.4 K/UL — SIGNIFICANT CHANGE UP (ref 3.8–10.5)
WBC # FLD AUTO: 6.4 K/UL — SIGNIFICANT CHANGE UP (ref 3.8–10.5)

## 2018-09-02 PROCEDURE — 99233 SBSQ HOSP IP/OBS HIGH 50: CPT | Mod: GC

## 2018-09-02 RX ADMIN — AMLODIPINE BESYLATE 10 MILLIGRAM(S): 2.5 TABLET ORAL at 05:42

## 2018-09-02 RX ADMIN — Medication 100 MILLIGRAM(S): at 11:32

## 2018-09-02 RX ADMIN — Medication 1 APPLICATION(S): at 05:43

## 2018-09-02 RX ADMIN — SENNA PLUS 2 TABLET(S): 8.6 TABLET ORAL at 21:59

## 2018-09-02 RX ADMIN — HEPARIN SODIUM 5000 UNIT(S): 5000 INJECTION INTRAVENOUS; SUBCUTANEOUS at 13:14

## 2018-09-02 RX ADMIN — HEPARIN SODIUM 5000 UNIT(S): 5000 INJECTION INTRAVENOUS; SUBCUTANEOUS at 05:42

## 2018-09-02 RX ADMIN — PANTOPRAZOLE SODIUM 40 MILLIGRAM(S): 20 TABLET, DELAYED RELEASE ORAL at 06:20

## 2018-09-02 RX ADMIN — HEPARIN SODIUM 5000 UNIT(S): 5000 INJECTION INTRAVENOUS; SUBCUTANEOUS at 21:59

## 2018-09-02 RX ADMIN — Medication 1 APPLICATION(S): at 17:16

## 2018-09-02 NOTE — PROGRESS NOTE ADULT - PROBLEM SELECTOR PLAN 2
Likely due to underlying dysphagia and inability to tolerate PO  -RPR and Kellie D neg, TSH pending  -Patient lives with her sister who is currently in [facility] for [facility]   - needs SW eval for safe discharge plan  - PT evaluation pending- no weight bearing restrictions  - consider neuro eval as outpatient for normal pressure hydrocephalus, though does not have all clinical symptoms  - on soft diet, d/rene fluids as she's eating Likely due to underlying dysphagia and inability to tolerate PO  -RPR and Kellie D neg, TSH wnl  -Patient lives with her sister who is currently in [facility] for [facility]   - needs SW eval for safe discharge plan  - PT evaluation pending- no weight bearing restrictions  - on soft diet

## 2018-09-02 NOTE — PROGRESS NOTE ADULT - SUBJECTIVE AND OBJECTIVE BOX
Yohannes Reid MD PGY-2  Contact/Pager - [phone]      Patient is a 89y old  Female who presents with a chief complaint of difficulty swallowing, weight loss (01 Sep 2018 09:53)        SUBJECTIVE / OVERNIGHT EVENTS: No acute events ON. Pt c/o chronic LE burning. No other acute complaints, denies: CP, SOB, abdominal pain.       MEDICATIONS  (STANDING):  amLODIPine   Tablet 10 milliGRAM(s) Oral daily  AQUAPHOR (petrolatum Ointment) 1 Application(s) Topical two times a day  docusate sodium 100 milliGRAM(s) Oral daily  heparin  Injectable 5000 Unit(s) SubCutaneous every 8 hours  pantoprazole    Tablet 40 milliGRAM(s) Oral before breakfast  senna 2 Tablet(s) Oral at bedtime    MEDICATIONS  (PRN):  polyethylene glycol 3350 17 Gram(s) Oral daily PRN Constipation  simethicone 80 milliGRAM(s) Chew three times a day PRN Gas      Allergies    bacitracin (Unknown)  clindamycin (Unknown)  Desoximetasone (Unknown)  penicillin (Unknown)  vancomycin (Unknown)  walnut (Unknown)    Intolerances          Vital Signs Last 24 Hrs  T(C): 36.3 (02 Sep 2018 12:49), Max: 36.8 (02 Sep 2018 04:36)  T(F): 97.3 (02 Sep 2018 12:49), Max: 98.2 (02 Sep 2018 04:36)  HR: 70 (02 Sep 2018 12:49) (60 - 79)  BP: 146/82 (02 Sep 2018 12:49) (138/78 - 177/88)  BP(mean): --  RR: 18 (02 Sep 2018 12:49) (16 - 18)  SpO2: 95% (02 Sep 2018 12:49) (95% - 96%)  CAPILLARY BLOOD GLUCOSE        I&O's Summary    02 Sep 2018 07:01  -  02 Sep 2018 16:17  --------------------------------------------------------  IN: 210 mL / OUT: 0 mL / NET: 210 mL          PHYSICAL EXAM:  GENERAL: NAD, cachetic   HEAD:  AT, NC  EYES: EOMI  ENMT: Airway patent. MMM  CHEST/LUNG: CTABL  HEART: RRR; Normal S1, S2. No murmurs  ABDOMEN: Soft, NT, ND; Bowel sounds present  EXTREMITIES:  no edema, dry skin  PSYCH: AAOx3      LABS:                        15.1   6.4   )-----------( 220      ( 02 Sep 2018 07:11 )             46.8     09-02    141  |  104  |  12  ----------------------------<  96  3.8   |  27  |  0.72    Ca    9.8      02 Sep 2018 07:11  Phos  3.2     09-02  Mg     2.2     09-02    TPro  6.0  /  Alb  3.3  /  TBili  0.4  /  DBili  x   /  AST  13  /  ALT  9<L>  /  AlkPhos  56  09-01    LIVER FUNCTIONS - ( 01 Sep 2018 07:10 )  Alb: 3.3 g/dL / Pro: 6.0 g/dL / ALK PHOS: 56 U/L / ALT: 9 U/L / AST: 13 U/L / GGT: x                           RADIOLOGY & ADDITIONAL TESTS:    Imaging Personally Reviewed:     Consultant(s) Notes Reviewed:     Care Discussed with Consultants/Other Providers:

## 2018-09-02 NOTE — PROGRESS NOTE ADULT - ATTENDING COMMENTS
I again had a long discussion with patient and friend who is the HCP.  Patient is frustrated because she is not feeling better.  She still feel like a ball on the midchest/ Esophageal after eating which was initially improving with Protonix, and soft diet , but is not improving now.  As per friend, patient has lost a lot of weight in short period of time because she was afraid of eating .  at this conjuncture , I will consult GI in [place] for possible EGD or ?/ Esophagogram .        Jessa Winter   Hospitalist   [phone] I again had a long discussion with patient and friend who is the HCP.  Patient is frustrated because she is not feeling better.  She still feel like a ball on the midchest/ Esophageal after eating which was initially improving with Protonix, and soft diet , but is not improving now.  As per friend, patient has lost a lot of weight in short period of time because she was afraid of eating .  at this conjuncture , I will consult GI in [place] for possible EGD or ?/ Esophagogram .  COnt Amlodipine and if BP remains high , start low dose ACEI .        Jessa Winter   Hospitalist   [phone]

## 2018-09-03 LAB
ANION GAP SERPL CALC-SCNC: 13 MMOL/L — SIGNIFICANT CHANGE UP (ref 5–17)
BUN SERPL-MCNC: 15 MG/DL — SIGNIFICANT CHANGE UP (ref 7–23)
CALCIUM SERPL-MCNC: 9.7 MG/DL — SIGNIFICANT CHANGE UP (ref 8.4–10.5)
CHLORIDE SERPL-SCNC: 103 MMOL/L — SIGNIFICANT CHANGE UP (ref 96–108)
CO2 SERPL-SCNC: 26 MMOL/L — SIGNIFICANT CHANGE UP (ref 22–31)
CREAT SERPL-MCNC: 0.7 MG/DL — SIGNIFICANT CHANGE UP (ref 0.5–1.3)
GLUCOSE SERPL-MCNC: 84 MG/DL — SIGNIFICANT CHANGE UP (ref 70–99)
HCT VFR BLD CALC: 47.5 % — HIGH (ref 34.5–45)
HGB BLD-MCNC: 14.7 G/DL — SIGNIFICANT CHANGE UP (ref 11.5–15.5)
MAGNESIUM SERPL-MCNC: 2.2 MG/DL — SIGNIFICANT CHANGE UP (ref 1.6–2.6)
MCHC RBC-ENTMCNC: 28.8 PG — SIGNIFICANT CHANGE UP (ref 27–34)
MCHC RBC-ENTMCNC: 30.8 GM/DL — LOW (ref 32–36)
MCV RBC AUTO: 93.5 FL — SIGNIFICANT CHANGE UP (ref 80–100)
PHOSPHATE SERPL-MCNC: 3.9 MG/DL — SIGNIFICANT CHANGE UP (ref 2.5–4.5)
PLATELET # BLD AUTO: 228 K/UL — SIGNIFICANT CHANGE UP (ref 150–400)
POTASSIUM SERPL-MCNC: 3.6 MMOL/L — SIGNIFICANT CHANGE UP (ref 3.5–5.3)
POTASSIUM SERPL-SCNC: 3.6 MMOL/L — SIGNIFICANT CHANGE UP (ref 3.5–5.3)
RBC # BLD: 5.08 M/UL — SIGNIFICANT CHANGE UP (ref 3.8–5.2)
RBC # FLD: 14.2 % — SIGNIFICANT CHANGE UP (ref 10.3–14.5)
SODIUM SERPL-SCNC: 142 MMOL/L — SIGNIFICANT CHANGE UP (ref 135–145)
WBC # BLD: 9.9 K/UL — SIGNIFICANT CHANGE UP (ref 3.8–10.5)
WBC # FLD AUTO: 9.9 K/UL — SIGNIFICANT CHANGE UP (ref 3.8–10.5)

## 2018-09-03 PROCEDURE — 99233 SBSQ HOSP IP/OBS HIGH 50: CPT | Mod: GC

## 2018-09-03 RX ORDER — ACETAMINOPHEN 500 MG
650 TABLET ORAL EVERY 6 HOURS
Qty: 0 | Refills: 0 | Status: DISCONTINUED | OUTPATIENT
Start: 2018-09-03 | End: 2018-09-06

## 2018-09-03 RX ADMIN — Medication 1 APPLICATION(S): at 18:02

## 2018-09-03 RX ADMIN — SENNA PLUS 2 TABLET(S): 8.6 TABLET ORAL at 22:17

## 2018-09-03 RX ADMIN — HEPARIN SODIUM 5000 UNIT(S): 5000 INJECTION INTRAVENOUS; SUBCUTANEOUS at 14:19

## 2018-09-03 RX ADMIN — HEPARIN SODIUM 5000 UNIT(S): 5000 INJECTION INTRAVENOUS; SUBCUTANEOUS at 22:17

## 2018-09-03 RX ADMIN — PANTOPRAZOLE SODIUM 40 MILLIGRAM(S): 20 TABLET, DELAYED RELEASE ORAL at 06:07

## 2018-09-03 RX ADMIN — HEPARIN SODIUM 5000 UNIT(S): 5000 INJECTION INTRAVENOUS; SUBCUTANEOUS at 06:07

## 2018-09-03 RX ADMIN — Medication 1 APPLICATION(S): at 06:07

## 2018-09-03 RX ADMIN — Medication 650 MILLIGRAM(S): at 23:47

## 2018-09-03 RX ADMIN — AMLODIPINE BESYLATE 10 MILLIGRAM(S): 2.5 TABLET ORAL at 06:07

## 2018-09-03 RX ADMIN — Medication 100 MILLIGRAM(S): at 14:20

## 2018-09-03 NOTE — PROGRESS NOTE ADULT - PROBLEM SELECTOR PLAN 2
Likely due to underlying dysphagia and difficulty tolerating PO. PO tolerance now improved on PPI but pt with decreased interested in eating. Will consider offering pt remeron.  -RPR and Kellie D neg, TSH wnl  -Patient lives with her sister who is currently in [facility] for [facility]   - needs  eval for safe discharge plan  - PT evaluation pending- no weight bearing restrictions  - on soft diet

## 2018-09-03 NOTE — CHART NOTE - NSCHARTNOTEFT_GEN_A_CORE
I sat down with Ms. Huitron and her HCP at bedside to discuss work up for dysphagia, weight loss. During our discussion, the patient and HCP expressed that they felt her weight loss was related to her being afraid to eat. I confirmed this with the patient herself. She has choked on food before and is cautious about eating now. They felt optimistic that she ate more breakfast today as well. The patient stated she felt encouraged by the speech therapist who told her that her musculature is "good" for a patient who is 89 years old during MBS.     We discussed that malignancy is on differential for FTT and weight loss and reviewed old CT findings of pancreatic, adrenal and renal lesions. They state they were aware of these and did not have further work up. One of the next steps in our work up would include endoscopy and/or CT A/P. Prior to proceeding with further consult services or imaging I asked the patient and HCP if they would be willing to pursue surgery if something is found in the work up that requires aggressive intervention. The patient and HCP stated they were unsure what they would want but understood the reason for asking. They requested another day of focus on swallowing technique and eating slowly and time to think about this decision.     Kaity Lombardi, PGY 3  Internal Medicine  Pager [phone] | [phone]

## 2018-09-03 NOTE — PROGRESS NOTE ADULT - SUBJECTIVE AND OBJECTIVE BOX
***************************************************************  Theo Larios MD Pgy-1  Contact/Pager [phone] / [phone]  ***************************************************************    JOSE KUMAR  89y  MRN: [id number]      Patient is a 89y old  Female who presents with a chief complaint of difficulty swallowing, weight loss (01 Sep 2018 09:53)      Subjective/ON Events: No acute events ON. Pt c/o chronic LE burning and phlegm. O/w denies: fever, chills, CP, SOB, abdominal pain, n/v/d/c      MEDICATIONS  (STANDING):  amLODIPine   Tablet 10 milliGRAM(s) Oral daily  AQUAPHOR (petrolatum Ointment) 1 Application(s) Topical two times a day  docusate sodium 100 milliGRAM(s) Oral daily  heparin  Injectable 5000 Unit(s) SubCutaneous every 8 hours  pantoprazole    Tablet 40 milliGRAM(s) Oral before breakfast  senna 2 Tablet(s) Oral at bedtime    MEDICATIONS  (PRN):  polyethylene glycol 3350 17 Gram(s) Oral daily PRN Constipation  simethicone 80 milliGRAM(s) Chew three times a day PRN Gas        Objective:    Vitals: Vital Signs Last 24 Hrs  T(C): 36.6 (09-03-18 @ 12:16), Max: 36.6 (09-03-18 @ 05:03)  T(F): 97.9 (09-03-18 @ 12:16), Max: 97.9 (09-03-18 @ 05:03)  HR: 73 (09-03-18 @ 12:16) (66 - 75)  BP: 117/73 (09-03-18 @ 12:16) (117/73 - 162/81)  RR: 18 (09-03-18 @ 12:16) (18 - 18)  SpO2: 94% (09-03-18 @ 12:16) (93% - 95%)            I&O's Summary    02 Sep 2018 07:01  -  03 Sep 2018 07:00  --------------------------------------------------------  IN: 390 mL / OUT: 0 mL / NET: 390 mL        PHYSICAL EXAM:  GENERAL: NAD, cachetic   HEENT:  NCAT, EOMI, neck supple, no LAD  CHEST/LUNG: CTABL  HEART: RRR; Normal S1, S2. No murmurs  ABDOMEN: Soft, NT, ND; Bowel sounds present  EXTREMITIES:  no edema or erythema, dry skin, LE tender to palpation  PSYCH: AAOx3                                           LABS:  09-03    142  |  103  |  15  ----------------------------<  84  3.6   |  26  |  0.70  09-02      Ca    9.7      03 Sep 2018 07:54  Phos  3.9     09-03  Mg     2.2     09-03    TPro  6.0  /  Alb  3.3  /  TBili  0.4  /  DBili  x   /  AST  13  /  ALT  9<L>  /  AlkPhos  56  09-01                                       14.7   9.9   )-----------( 228      ( 03 Sep 2018 07:54 )             47.5

## 2018-09-04 LAB
ANION GAP SERPL CALC-SCNC: 12 MMOL/L — SIGNIFICANT CHANGE UP (ref 5–17)
BUN SERPL-MCNC: 20 MG/DL — SIGNIFICANT CHANGE UP (ref 7–23)
CALCIUM SERPL-MCNC: 9.5 MG/DL — SIGNIFICANT CHANGE UP (ref 8.4–10.5)
CHLORIDE SERPL-SCNC: 102 MMOL/L — SIGNIFICANT CHANGE UP (ref 96–108)
CO2 SERPL-SCNC: 26 MMOL/L — SIGNIFICANT CHANGE UP (ref 22–31)
CREAT SERPL-MCNC: 0.67 MG/DL — SIGNIFICANT CHANGE UP (ref 0.5–1.3)
GLUCOSE SERPL-MCNC: 89 MG/DL — SIGNIFICANT CHANGE UP (ref 70–99)
MAGNESIUM SERPL-MCNC: 2.2 MG/DL — SIGNIFICANT CHANGE UP (ref 1.6–2.6)
PHOSPHATE SERPL-MCNC: 3.9 MG/DL — SIGNIFICANT CHANGE UP (ref 2.5–4.5)
POTASSIUM SERPL-MCNC: 4 MMOL/L — SIGNIFICANT CHANGE UP (ref 3.5–5.3)
POTASSIUM SERPL-SCNC: 4 MMOL/L — SIGNIFICANT CHANGE UP (ref 3.5–5.3)
SODIUM SERPL-SCNC: 140 MMOL/L — SIGNIFICANT CHANGE UP (ref 135–145)

## 2018-09-04 PROCEDURE — 99233 SBSQ HOSP IP/OBS HIGH 50: CPT | Mod: GC

## 2018-09-04 RX ORDER — CHOLECALCIFEROL (VITAMIN D3) 125 MCG
1000 CAPSULE ORAL DAILY
Qty: 0 | Refills: 0 | Status: DISCONTINUED | OUTPATIENT
Start: 2018-09-04 | End: 2018-09-06

## 2018-09-04 RX ADMIN — Medication 650 MILLIGRAM(S): at 00:20

## 2018-09-04 RX ADMIN — PANTOPRAZOLE SODIUM 40 MILLIGRAM(S): 20 TABLET, DELAYED RELEASE ORAL at 06:40

## 2018-09-04 RX ADMIN — AMLODIPINE BESYLATE 10 MILLIGRAM(S): 2.5 TABLET ORAL at 06:41

## 2018-09-04 RX ADMIN — Medication 1 APPLICATION(S): at 18:36

## 2018-09-04 RX ADMIN — Medication 1000 UNIT(S): at 18:36

## 2018-09-04 RX ADMIN — SENNA PLUS 2 TABLET(S): 8.6 TABLET ORAL at 21:47

## 2018-09-04 RX ADMIN — HEPARIN SODIUM 5000 UNIT(S): 5000 INJECTION INTRAVENOUS; SUBCUTANEOUS at 21:47

## 2018-09-04 RX ADMIN — Medication 100 MILLIGRAM(S): at 12:56

## 2018-09-04 RX ADMIN — Medication 1 APPLICATION(S): at 06:40

## 2018-09-04 RX ADMIN — HEPARIN SODIUM 5000 UNIT(S): 5000 INJECTION INTRAVENOUS; SUBCUTANEOUS at 12:56

## 2018-09-04 RX ADMIN — HEPARIN SODIUM 5000 UNIT(S): 5000 INJECTION INTRAVENOUS; SUBCUTANEOUS at 06:40

## 2018-09-04 NOTE — CHART NOTE - NSCHARTNOTEFT_GEN_A_CORE
NUTRITION FOLLOW UP NOTE   Pt seen for length of stay.     Chart reviewed, events noted. S/p MBS on 8/31 and recommended for a soft diet. Team had goals of care discussion & told pt & HCP that malignancy is on differential; they are unsure if they want to pursue workup/intervention.      Source: Patient [ ]    Family [ ]     other [x] comprehensive chart review     Diet : Soft      Patient reports [ ] nausea  [ ] vomiting [ ] diarrhea [ ] constipation  [ ]chewing problems [ ] swallowing issues  [ ] other:      PO intake:  % intake     Source for PO intake [ ] Patient [ ] family [x] chart [ ] staff [ ] other        No new Wt to address.    Pertinent Medications: MEDICATIONS  (STANDING):  amLODIPine   Tablet 10 milliGRAM(s) Oral daily  AQUAPHOR (petrolatum Ointment) 1 Application(s) Topical two times a day  heparin  Injectable 5000 Unit(s) SubCutaneous every 8 hours  pantoprazole    Tablet 40 milliGRAM(s) Oral before breakfast  senna 2 Tablet(s) Oral at bedtime    MEDICATIONS  (PRN):  acetaminophen   Tablet. 650 milliGRAM(s) Oral every 6 hours PRN Moderate Pain (4 - 6)  polyethylene glycol 3350 17 Gram(s) Oral daily PRN Constipation  simethicone 80 milliGRAM(s) Chew three times a day PRN Gas    Pertinent Labs:  09-04 Na140 mmol/L Glu 89 mg/dL K+ 4.0 mmol/L Cr  0.67 mg/dL BUN 20 mg/dL 09-04 Phos 3.9 mg/dL 09-01 Alb 3.3 g/dL      Skin: Intact. No edema.    Estimated Needs:   [x] no change since previous assessment  [ ] recalculated:       Previous Nutrition Diagnosis:  [x] Malnutrition (severe)         Nutrition Diagnosis is [x] ongoing, Pt continues to have overall suboptimal PO intake       New Nutrition Diagnosis: [x] not applicable         Interventions:     Recommend    Provide Ensure Enlive twice daily (provides additional 700kcal, 40g protein)   Continue to encourage good po intake at meals   Continue to provide food preferences within diet restrictions as feasible   Provide feeding assistance as needed.      Monitoring and Evaluation:     [x] PO intake [x] Tolerance to diet prescription [x] weights [x] follow up per protocol    RD to remain available for further nutritional interventions as indicated/requested by medical team/pt.   Darion Bazzi, RD, CDN, CDE. Pager: [phone] NUTRITION FOLLOW UP NOTE   Pt seen for length of stay.     Chart reviewed, events noted. S/p MBS on 8/31 and recommended for a soft diet. Team had goals of care discussion & told pt & HCP that malignancy is on differential; they are unsure if they want to pursue workup/intervention.      Source: Patient [x]    Family [x]    other [x] comprehensive chart review     Diet : Soft      Patient reports no nausea, vomiting, diarrhea or constipation. Pt states she still fears she will choke when eating and has been eating very slow. States she has not had a choking episode since admission. Pt states she would like vanilla and strawberry ensure.       PO intake:  % intake     Source for PO intake [ ] Patient [ ] family [x] chart [ ] staff [ ] other        No new Wt to address.    Pertinent Medications: MEDICATIONS  (STANDING):  amLODIPine   Tablet 10 milliGRAM(s) Oral daily  AQUAPHOR (petrolatum Ointment) 1 Application(s) Topical two times a day  heparin  Injectable 5000 Unit(s) SubCutaneous every 8 hours  pantoprazole    Tablet 40 milliGRAM(s) Oral before breakfast  senna 2 Tablet(s) Oral at bedtime    MEDICATIONS  (PRN):  acetaminophen   Tablet. 650 milliGRAM(s) Oral every 6 hours PRN Moderate Pain (4 - 6)  polyethylene glycol 3350 17 Gram(s) Oral daily PRN Constipation  simethicone 80 milliGRAM(s) Chew three times a day PRN Gas    Pertinent Labs:  09-04 Na140 mmol/L Glu 89 mg/dL K+ 4.0 mmol/L Cr  0.67 mg/dL BUN 20 mg/dL 09-04 Phos 3.9 mg/dL 09-01 Alb 3.3 g/dL      Skin: Intact. No edema.    Estimated Needs:   [x] no change since previous assessment  [ ] recalculated:       Previous Nutrition Diagnosis:  [x] Malnutrition (severe)         Nutrition Diagnosis is [x] ongoing, Pt continues to have overall suboptimal PO intake       New Nutrition Diagnosis: [x] not applicable         Interventions:     Recommend    Provide Ensure Enlive twice daily (provides additional 700kcal, 40g protein)   Continue to encourage good po intake at meals   Continue to provide food preferences within diet restrictions as feasible   Provide feeding assistance as needed.      Monitoring and Evaluation:     [x] PO intake [x] Tolerance to diet prescription [x] weights [x] follow up per protocol    RD to remain available for further nutritional interventions as indicated/requested by medical team/pt.   Darion Bazzi, RD, CDN, CDE. Pager: [phone]

## 2018-09-04 NOTE — PROGRESS NOTE ADULT - PROBLEM SELECTOR PLAN 5
Trial of PPI with protonix   cont to monitor   f/u PT note final Trial of PPI with protonix   cont to monitor   PT recommending TIFFANIE

## 2018-09-04 NOTE — PROGRESS NOTE ADULT - SUBJECTIVE AND OBJECTIVE BOX
***************************************************************  Theo Larios MD Pgy-1  Contact/Pager [phone] / [phone]  ***************************************************************    JOSE KUMAR  89y  MRN: [id number]      Patient is a 89y old  Female who presents with a chief complaint of difficulty swallowing, weight loss (01 Sep 2018 09:53)      Subjective/ON Events: No acute events. Pt doing better with meals now that she is swallowing food in smaller amounts and slowly. Reports less phlegm production as well. No fever, chills, cough, CP, SOB, abdominal pain, n/v/d/c.      MEDICATIONS  (STANDING):  amLODIPine   Tablet 10 milliGRAM(s) Oral daily  AQUAPHOR (petrolatum Ointment) 1 Application(s) Topical two times a day  heparin  Injectable 5000 Unit(s) SubCutaneous every 8 hours  pantoprazole    Tablet 40 milliGRAM(s) Oral before breakfast  senna 2 Tablet(s) Oral at bedtime    MEDICATIONS  (PRN):  acetaminophen   Tablet. 650 milliGRAM(s) Oral every 6 hours PRN Moderate Pain (4 - 6)  polyethylene glycol 3350 17 Gram(s) Oral daily PRN Constipation  simethicone 80 milliGRAM(s) Chew three times a day PRN Gas        Objective:    Vitals: Vital Signs Last 24 Hrs  T(C): 36.6 (09-04-18 @ 13:22), Max: 36.6 (09-04-18 @ 13:22)  T(F): 97.9 (09-04-18 @ 13:22), Max: 97.9 (09-04-18 @ 13:22)  HR: 76 (09-04-18 @ 13:22) (66 - 76)  BP: 133/76 (09-04-18 @ 13:22) (133/76 - 168/77)  BP(mean): --  RR: 18 (09-04-18 @ 13:22) (18 - 18)  SpO2: 98% (09-04-18 @ 13:22) (93% - 98%)            I&O's Summary    03 Sep 2018 07:01  -  04 Sep 2018 07:00  --------------------------------------------------------  IN: 120 mL / OUT: 0 mL / NET: 120 mL        PHYSICAL EXAM:  GENERAL: NAD  HEAD:  Atraumatic, Normocephalic  EYES: EOMI, PERRLA, conjunctiva and sclera clear  CHEST/LUNG: Clear to percussion bilaterally; No rales, rhonchi, wheezing, or rubs  HEART: Regular rate and rhythm; No murmurs, rubs, or gallops  ABDOMEN: Soft, Nontender, Nondistended; Bowel sounds present  SKIN: No rashes or lesions  NERVOUS SYSTEM:  Alert & Oriented X3, Good concentration; Motor Strength 5/5 B/L upper and lower extremities                                           LABS:  09-04    140  |  102  |  20  ----------------------------<  89  4.0   |  26  |  0.67  09-03    142  |  103  |  15  ----------------------------<  84  3.6   |  26  |  0.70  09-02    141  |  104  |  12  ----------------------------<  96  3.8   |  27  |  0.72    Ca    9.5      04 Sep 2018 07:19  Phos  3.9     09-04  Mg     2.2     09-04                  14.7   9.9   )-----------( 228      ( 03 Sep 2018 07:54 )             47.5

## 2018-09-04 NOTE — PROGRESS NOTE ADULT - PROBLEM SELECTOR PLAN 2
Likely due to underlying dysphagia and difficulty tolerating PO. PO tolerance now improved on PPI. Pt has decreased interest in eating as well but improving now that she is eating food in smaller amounts at a time and slowly.  -RPR and Kellie D neg, TSH wnl  -Patient lives with her sister who is currently in [facility] for [facility]   - needs SW eval for safe discharge plan  - PT evaluation pending- no weight bearing restrictions  - on soft diet Likely due to underlying dysphagia and difficulty tolerating PO. PO tolerance now improved on PPI. Pt has decreased interest in eating as well but improving now that she is eating food in smaller amounts at a time and slowly.  -RPR neg, TSH wnl  -vit d insufficiency noted. can start supplementation  -Patient lives with her sister who is currently in [facility] for [facility]   - needs  eval for safe discharge plan  - PT evaluation pending- no weight bearing restrictions  - on soft diet

## 2018-09-04 NOTE — PROGRESS NOTE ADULT - ATTENDING COMMENTS
will follow up with pts HCP today regarding significant weight loss, dysphagia symptoms regarding possible malignancy work up (i.e. CT chest, abdomen, pelvis) and pending this can pursue d/c planning to [facility].

## 2018-09-05 LAB
ANION GAP SERPL CALC-SCNC: 12 MMOL/L — SIGNIFICANT CHANGE UP (ref 5–17)
BUN SERPL-MCNC: 19 MG/DL — SIGNIFICANT CHANGE UP (ref 7–23)
CALCIUM SERPL-MCNC: 9.5 MG/DL — SIGNIFICANT CHANGE UP (ref 8.4–10.5)
CHLORIDE SERPL-SCNC: 104 MMOL/L — SIGNIFICANT CHANGE UP (ref 96–108)
CO2 SERPL-SCNC: 27 MMOL/L — SIGNIFICANT CHANGE UP (ref 22–31)
CREAT SERPL-MCNC: 0.78 MG/DL — SIGNIFICANT CHANGE UP (ref 0.5–1.3)
GLUCOSE SERPL-MCNC: 87 MG/DL — SIGNIFICANT CHANGE UP (ref 70–99)
MAGNESIUM SERPL-MCNC: 2.1 MG/DL — SIGNIFICANT CHANGE UP (ref 1.6–2.6)
PHOSPHATE SERPL-MCNC: 3.3 MG/DL — SIGNIFICANT CHANGE UP (ref 2.5–4.5)
POTASSIUM SERPL-MCNC: 3.7 MMOL/L — SIGNIFICANT CHANGE UP (ref 3.5–5.3)
POTASSIUM SERPL-SCNC: 3.7 MMOL/L — SIGNIFICANT CHANGE UP (ref 3.5–5.3)
SODIUM SERPL-SCNC: 143 MMOL/L — SIGNIFICANT CHANGE UP (ref 135–145)

## 2018-09-05 PROCEDURE — 74177 CT ABD & PELVIS W/CONTRAST: CPT | Mod: 26

## 2018-09-05 PROCEDURE — 99232 SBSQ HOSP IP/OBS MODERATE 35: CPT | Mod: GC

## 2018-09-05 PROCEDURE — 71260 CT THORAX DX C+: CPT | Mod: 26

## 2018-09-05 RX ADMIN — HEPARIN SODIUM 5000 UNIT(S): 5000 INJECTION INTRAVENOUS; SUBCUTANEOUS at 21:41

## 2018-09-05 RX ADMIN — SENNA PLUS 2 TABLET(S): 8.6 TABLET ORAL at 21:41

## 2018-09-05 RX ADMIN — Medication 1000 UNIT(S): at 13:04

## 2018-09-05 RX ADMIN — Medication 1 APPLICATION(S): at 17:09

## 2018-09-05 RX ADMIN — PANTOPRAZOLE SODIUM 40 MILLIGRAM(S): 20 TABLET, DELAYED RELEASE ORAL at 06:33

## 2018-09-05 RX ADMIN — AMLODIPINE BESYLATE 10 MILLIGRAM(S): 2.5 TABLET ORAL at 06:33

## 2018-09-05 RX ADMIN — HEPARIN SODIUM 5000 UNIT(S): 5000 INJECTION INTRAVENOUS; SUBCUTANEOUS at 06:33

## 2018-09-05 RX ADMIN — HEPARIN SODIUM 5000 UNIT(S): 5000 INJECTION INTRAVENOUS; SUBCUTANEOUS at 13:04

## 2018-09-05 RX ADMIN — Medication 1 APPLICATION(S): at 06:34

## 2018-09-05 NOTE — PROGRESS NOTE ADULT - PROBLEM SELECTOR PLAN 2
Likely due to underlying dysphagia and difficulty tolerating PO. PO tolerance now improved on PPI. Pt has decreased interest in eating as well but improving now that she is eating food in smaller amounts at a time and slowly.  -RPR neg, TSH wnl  -vit d insufficiency noted. can start supplementation  -Patient lives with her sister who is currently in [facility] for [facility]   - needs  eval for safe discharge plan  - PT evaluation pending- no weight bearing restrictions  - on soft diet Likely due to underlying dysphagia and difficulty tolerating PO. PO tolerance now improved on PPI. Pt has decreased interest in eating as well but improving now that she is eating food in smaller amounts at a time and slowly.  -RPR neg, TSH wnl  -vit d insufficiency noted. can start supplementation  -Patient lives with her sister who is currently in [facility] for [facility]  - PT evaluation pending- no weight bearing restrictions  - on soft diet  - Given degree of weight loss within the past month, concern for malignancy. CT C/A/P done today - mainly non-revealing.  - SW following - pt okay for discharge to [facility]

## 2018-09-05 NOTE — PROGRESS NOTE ADULT - SUBJECTIVE AND OBJECTIVE BOX
***************************************************************  Theo Larios MD Pgy-1  Contact/Pager [phone] / [phone]  ***************************************************************    JOSE KUMAR  89y  MRN: [id number]      Patient is a 89y old  Female who presents with a chief complaint of difficulty swallowing, weight loss (04 Sep 2018 16:12)      Subjective/ON Events: No acute events overnight.      MEDICATIONS  (STANDING):  amLODIPine   Tablet 10 milliGRAM(s) Oral daily  AQUAPHOR (petrolatum Ointment) 1 Application(s) Topical two times a day  cholecalciferol 1000 Unit(s) Oral daily  heparin  Injectable 5000 Unit(s) SubCutaneous every 8 hours  pantoprazole    Tablet 40 milliGRAM(s) Oral before breakfast  senna 2 Tablet(s) Oral at bedtime    MEDICATIONS  (PRN):  acetaminophen   Tablet. 650 milliGRAM(s) Oral every 6 hours PRN Moderate Pain (4 - 6)  polyethylene glycol 3350 17 Gram(s) Oral daily PRN Constipation  simethicone 80 milliGRAM(s) Chew three times a day PRN Gas        Objective:    Vitals: Vital Signs Last 24 Hrs  T(C): 36.5 (09-05-18 @ 04:12), Max: 36.6 (09-04-18 @ 13:22)  T(F): 97.7 (09-05-18 @ 04:12), Max: 97.9 (09-04-18 @ 13:22)  HR: 66 (09-05-18 @ 04:12) (66 - 78)  BP: 135/71 (09-05-18 @ 04:12) (133/76 - 135/71)  BP(mean): --  RR: 18 (09-05-18 @ 04:12) (18 - 18)  SpO2: 95% (09-05-18 @ 04:12) (95% - 98%)            I&O's Summary    04 Sep 2018 07:01  -  05 Sep 2018 07:00  --------------------------------------------------------  IN: 240 mL / OUT: 0 mL / NET: 240 mL        PHYSICAL EXAM:  GENERAL: NAD  HEAD:  Atraumatic, Normocephalic  EYES: EOMI, PERRLA, conjunctiva and sclera clear  CHEST/LUNG: Clear to percussion bilaterally; No rales, rhonchi, wheezing, or rubs  HEART: Regular rate and rhythm; No murmurs, rubs, or gallops  ABDOMEN: Soft, Nontender, Nondistended; Bowel sounds present  SKIN: No rashes or lesions  NERVOUS SYSTEM:  Alert & Oriented X3, Good concentration; Motor Strength 5/5 B/L upper and lower extremities                                           LABS:  09-05    143  |  104  |  19  ----------------------------<  87  3.7   |  27  |  0.78  09-04    140  |  102  |  20  ----------------------------<  89  4.0   |  26  |  0.67  09-03    142  |  103  |  15  ----------------------------<  84  3.6   |  26  |  0.70    Ca    9.5      05 Sep 2018 07:10  Phos  3.3     09-05  Mg     2.1     09-05                          14.7   9.9   )-----------( 228      ( 03 Sep 2018 07:54 )             47.5 ***************************************************************  Theo Larios MD Pgy[facility]  Contact/Pager [phone] / [phone]  ***************************************************************    JOSE KUMAR  89y  MRN: [id number]      Patient is a 89y old  Female who presents with a chief complaint of difficulty swallowing, weight loss (04 Sep 2018 16:12)      Subjective/ON Events: No acute events overnight. Denies fever, chills, SOB, CP, abdominal pain, n/v/c/d.      MEDICATIONS  (STANDING):  amLODIPine   Tablet 10 milliGRAM(s) Oral daily  AQUAPHOR (petrolatum Ointment) 1 Application(s) Topical two times a day  cholecalciferol 1000 Unit(s) Oral daily  heparin  Injectable 5000 Unit(s) SubCutaneous every 8 hours  pantoprazole    Tablet 40 milliGRAM(s) Oral before breakfast  senna 2 Tablet(s) Oral at bedtime    MEDICATIONS  (PRN):  acetaminophen   Tablet. 650 milliGRAM(s) Oral every 6 hours PRN Moderate Pain (4 - 6)  polyethylene glycol 3350 17 Gram(s) Oral daily PRN Constipation  simethicone 80 milliGRAM(s) Chew three times a day PRN Gas        Objective:    Vitals: Vital Signs Last 24 Hrs  T(C): 36.5 (09-05-18 @ 04:12), Max: 36.6 (09-04-18 @ 13:22)  T(F): 97.7 (09-05-18 @ 04:12), Max: 97.9 (09-04-18 @ 13:22)  HR: 66 (09-05-18 @ 04:12) (66 - 78)  BP: 135/71 (09-05-18 @ 04:12) (133/76 - 135/71)  BP(mean): --  RR: 18 (09-05-18 @ 04:12) (18 - 18)  SpO2: 95% (09-05-18 @ 04:12) (95% - 98%)            I&O's Summary    04 Sep 2018 07:01  -  05 Sep 2018 07:00  --------------------------------------------------------  IN: 240 mL / OUT: 0 mL / NET: 240 mL        PHYSICAL EXAM:  GENERAL: NAD  HEAD:  Atraumatic, Normocephalic  EYES: EOMI, PERRLA, conjunctiva and sclera clear  CHEST/LUNG: Clear to percussion bilaterally; No rales, rhonchi, wheezing, or rubs  HEART: Regular rate and rhythm; No murmurs, rubs, or gallops  ABDOMEN: Soft, Nontender, Nondistended; Bowel sounds present  SKIN: No rashes or lesions  NERVOUS SYSTEM:  Alert & Oriented X3, Good concentration; Motor Strength 5/5 B/L upper and lower extremities                                           LABS:  09-05    143  |  104  |  19  ----------------------------<  87  3.7   |  27  |  0.78  09-04    140  |  102  |  20  ----------------------------<  89  4.0   |  26  |  0.67  09-03    142  |  103  |  15  ----------------------------<  84  3.6   |  26  |  0.70    Ca    9.5      05 Sep 2018 07:10  Phos  3.3     09-05  Mg     2.1     09-05                          14.7   9.9   )-----------( 228      ( 03 Sep 2018 07:54 )             47.5

## 2018-09-05 NOTE — PROGRESS NOTE ADULT - ATTENDING COMMENTS
reviewed CT A/P findings- pt with diverticulosis and 3cm aortic aneurym. No findings to explain weight loss. pt is now tolerating PO and without complaints. Planning for [facility]. Will begin d/c planning. Discusssed plan with pts HCP at bedside.

## 2018-09-06 VITALS
OXYGEN SATURATION: 95 % | HEART RATE: 68 BPM | RESPIRATION RATE: 18 BRPM | SYSTOLIC BLOOD PRESSURE: 137 MMHG | DIASTOLIC BLOOD PRESSURE: 72 MMHG | TEMPERATURE: 98 F

## 2018-09-06 LAB
ANION GAP SERPL CALC-SCNC: 12 MMOL/L — SIGNIFICANT CHANGE UP (ref 5–17)
BUN SERPL-MCNC: 18 MG/DL — SIGNIFICANT CHANGE UP (ref 7–23)
CALCIUM SERPL-MCNC: 9.6 MG/DL — SIGNIFICANT CHANGE UP (ref 8.4–10.5)
CHLORIDE SERPL-SCNC: 103 MMOL/L — SIGNIFICANT CHANGE UP (ref 96–108)
CO2 SERPL-SCNC: 27 MMOL/L — SIGNIFICANT CHANGE UP (ref 22–31)
CREAT SERPL-MCNC: 0.65 MG/DL — SIGNIFICANT CHANGE UP (ref 0.5–1.3)
GLUCOSE SERPL-MCNC: 87 MG/DL — SIGNIFICANT CHANGE UP (ref 70–99)
MAGNESIUM SERPL-MCNC: 2.2 MG/DL — SIGNIFICANT CHANGE UP (ref 1.6–2.6)
PHOSPHATE SERPL-MCNC: 3.5 MG/DL — SIGNIFICANT CHANGE UP (ref 2.5–4.5)
POTASSIUM SERPL-MCNC: 3.7 MMOL/L — SIGNIFICANT CHANGE UP (ref 3.5–5.3)
POTASSIUM SERPL-SCNC: 3.7 MMOL/L — SIGNIFICANT CHANGE UP (ref 3.5–5.3)
SODIUM SERPL-SCNC: 142 MMOL/L — SIGNIFICANT CHANGE UP (ref 135–145)

## 2018-09-06 PROCEDURE — 92610 EVALUATE SWALLOWING FUNCTION: CPT

## 2018-09-06 PROCEDURE — 82947 ASSAY GLUCOSE BLOOD QUANT: CPT

## 2018-09-06 PROCEDURE — 84100 ASSAY OF PHOSPHORUS: CPT

## 2018-09-06 PROCEDURE — 97162 PT EVAL MOD COMPLEX 30 MIN: CPT

## 2018-09-06 PROCEDURE — 70450 CT HEAD/BRAIN W/O DYE: CPT

## 2018-09-06 PROCEDURE — 85027 COMPLETE CBC AUTOMATED: CPT

## 2018-09-06 PROCEDURE — 82803 BLOOD GASES ANY COMBINATION: CPT

## 2018-09-06 PROCEDURE — 84443 ASSAY THYROID STIM HORMONE: CPT

## 2018-09-06 PROCEDURE — 80053 COMPREHEN METABOLIC PANEL: CPT

## 2018-09-06 PROCEDURE — 92611 MOTION FLUOROSCOPY/SWALLOW: CPT

## 2018-09-06 PROCEDURE — 74177 CT ABD & PELVIS W/CONTRAST: CPT

## 2018-09-06 PROCEDURE — 82435 ASSAY OF BLOOD CHLORIDE: CPT

## 2018-09-06 PROCEDURE — 82607 VITAMIN B-12: CPT

## 2018-09-06 PROCEDURE — 93005 ELECTROCARDIOGRAM TRACING: CPT

## 2018-09-06 PROCEDURE — 83605 ASSAY OF LACTIC ACID: CPT

## 2018-09-06 PROCEDURE — 80048 BASIC METABOLIC PNL TOTAL CA: CPT

## 2018-09-06 PROCEDURE — 83735 ASSAY OF MAGNESIUM: CPT

## 2018-09-06 PROCEDURE — 82306 VITAMIN D 25 HYDROXY: CPT

## 2018-09-06 PROCEDURE — 86780 TREPONEMA PALLIDUM: CPT

## 2018-09-06 PROCEDURE — 85610 PROTHROMBIN TIME: CPT

## 2018-09-06 PROCEDURE — 84295 ASSAY OF SERUM SODIUM: CPT

## 2018-09-06 PROCEDURE — 81003 URINALYSIS AUTO W/O SCOPE: CPT

## 2018-09-06 PROCEDURE — 85730 THROMBOPLASTIN TIME PARTIAL: CPT

## 2018-09-06 PROCEDURE — 99285 EMERGENCY DEPT VISIT HI MDM: CPT

## 2018-09-06 PROCEDURE — 71045 X-RAY EXAM CHEST 1 VIEW: CPT

## 2018-09-06 PROCEDURE — 82330 ASSAY OF CALCIUM: CPT

## 2018-09-06 PROCEDURE — 85014 HEMATOCRIT: CPT

## 2018-09-06 PROCEDURE — 84132 ASSAY OF SERUM POTASSIUM: CPT

## 2018-09-06 PROCEDURE — 74230 X-RAY XM SWLNG FUNCJ C+: CPT

## 2018-09-06 PROCEDURE — 87086 URINE CULTURE/COLONY COUNT: CPT

## 2018-09-06 PROCEDURE — 71260 CT THORAX DX C+: CPT

## 2018-09-06 PROCEDURE — 99239 HOSP IP/OBS DSCHRG MGMT >30: CPT

## 2018-09-06 RX ORDER — ACETAMINOPHEN 500 MG
2 TABLET ORAL
Qty: 0 | Refills: 0 | DISCHARGE
Start: 2018-09-06

## 2018-09-06 RX ORDER — CHOLECALCIFEROL (VITAMIN D3) 125 MCG
1000 CAPSULE ORAL
Qty: 0 | Refills: 0 | DISCHARGE
Start: 2018-09-06

## 2018-09-06 RX ORDER — SENNA PLUS 8.6 MG/1
2 TABLET ORAL
Qty: 0 | Refills: 0 | DISCHARGE
Start: 2018-09-06

## 2018-09-06 RX ORDER — SIMETHICONE 80 MG/1
1 TABLET, CHEWABLE ORAL
Qty: 0 | Refills: 0 | DISCHARGE
Start: 2018-09-06

## 2018-09-06 RX ORDER — PANTOPRAZOLE SODIUM 20 MG/1
1 TABLET, DELAYED RELEASE ORAL
Qty: 0 | Refills: 0 | DISCHARGE
Start: 2018-09-06

## 2018-09-06 RX ORDER — POLYETHYLENE GLYCOL 3350 17 G/17G
17 POWDER, FOR SOLUTION ORAL
Qty: 0 | Refills: 0 | DISCHARGE
Start: 2018-09-06

## 2018-09-06 RX ADMIN — Medication 1000 UNIT(S): at 12:56

## 2018-09-06 RX ADMIN — AMLODIPINE BESYLATE 10 MILLIGRAM(S): 2.5 TABLET ORAL at 05:55

## 2018-09-06 RX ADMIN — Medication 1 APPLICATION(S): at 05:55

## 2018-09-06 RX ADMIN — PANTOPRAZOLE SODIUM 40 MILLIGRAM(S): 20 TABLET, DELAYED RELEASE ORAL at 06:00

## 2018-09-06 RX ADMIN — HEPARIN SODIUM 5000 UNIT(S): 5000 INJECTION INTRAVENOUS; SUBCUTANEOUS at 05:55

## 2018-09-06 RX ADMIN — HEPARIN SODIUM 5000 UNIT(S): 5000 INJECTION INTRAVENOUS; SUBCUTANEOUS at 12:57

## 2018-09-06 NOTE — PROGRESS NOTE ADULT - SUBJECTIVE AND OBJECTIVE BOX
***************************************************************  Theo Larios MD Pgy-1  Contact/Pager [phone] / [phone]  ***************************************************************    JOSE KUMAR  89y  MRN: [id number]      Patient is a 89y old  Female who presents with a chief complaint of difficulty swallowing, weight loss (05 Sep 2018 09:26)      Subjective/ON Events: No acute events overnight      MEDICATIONS  (STANDING):  amLODIPine   Tablet 10 milliGRAM(s) Oral daily  AQUAPHOR (petrolatum Ointment) 1 Application(s) Topical two times a day  cholecalciferol 1000 Unit(s) Oral daily  heparin  Injectable 5000 Unit(s) SubCutaneous every 8 hours  pantoprazole    Tablet 40 milliGRAM(s) Oral before breakfast  senna 2 Tablet(s) Oral at bedtime    MEDICATIONS  (PRN):  acetaminophen   Tablet. 650 milliGRAM(s) Oral every 6 hours PRN Moderate Pain (4 - 6)  polyethylene glycol 3350 17 Gram(s) Oral daily PRN Constipation  simethicone 80 milliGRAM(s) Chew three times a day PRN Gas        Objective:    Vitals: Vital Signs Last 24 Hrs  T(C): 36.6 (09-06-18 @ 04:37), Max: 36.7 (09-05-18 @ 13:29)  T(F): 97.8 (09-06-18 @ 04:37), Max: 98 (09-05-18 @ 13:29)  HR: 67 (09-06-18 @ 04:37) (67 - 68)  BP: 151/73 (09-06-18 @ 04:37) (125/70 - 157/75)  BP(mean): --  RR: 18 (09-06-18 @ 04:37) (18 - 18)  SpO2: 94% (09-06-18 @ 04:37) (93% - 95%)            I&O's Summary    05 Sep 2018 07:01  -  06 Sep 2018 07:00  --------------------------------------------------------  IN: 400 mL / OUT: 3 mL / NET: 397 mL        PHYSICAL EXAM:  GENERAL: NAD  HEAD:  Atraumatic, Normocephalic  EYES: EOMI, PERRLA, conjunctiva and sclera clear  CHEST/LUNG: Clear to percussion bilaterally; No rales, rhonchi, wheezing, or rubs  HEART: Regular rate and rhythm; No murmurs, rubs, or gallops  ABDOMEN: Soft, Nontender, Nondistended; Bowel sounds present  SKIN: No rashes or lesions  NERVOUS SYSTEM:  Alert & Oriented X3, Good concentration; Motor Strength 5/5 B/L upper and lower extremities                                     LABS:  09-05    143  |  104  |  19  ----------------------------<  87  3.7   |  27  |  0.78  09-04    140  |  102  |  20  ----------------------------<  89  4.0   |  26  |  0.67  09-03    142  |  103  |  15  ----------------------------<  84  3.6   |  26  |  0.70    Ca    9.5      05 Sep 2018 07:10  Phos  3.3     09-05  Mg     2.1     09-05                                                14.7   9.9   )-----------( 228      ( 03 Sep 2018 07:54 )             47.5     CAPILLARY BLOOD GLUCOSE          RADIOLOGY & ADDITIONAL TESTS: ***************************************************************  Theo Larios MD Pgy-1  Contact/Pager [phone] / [phone]  ***************************************************************    JOSE KUMAR  89y  MRN: [id number]      Patient is a 89y old  Female who presents with a chief complaint of difficulty swallowing, weight loss (05 Sep 2018 09:26)      Subjective/ON Events: No acute events overnight. Pt continues to tolerate soft diet. No nausea, vomiting. Still with clear phlegm but denying fever, chills, SOB, CP. Also denies abdominal pain, diarrhea, constipation.      MEDICATIONS  (STANDING):  amLODIPine   Tablet 10 milliGRAM(s) Oral daily  AQUAPHOR (petrolatum Ointment) 1 Application(s) Topical two times a day  cholecalciferol 1000 Unit(s) Oral daily  heparin  Injectable 5000 Unit(s) SubCutaneous every 8 hours  pantoprazole    Tablet 40 milliGRAM(s) Oral before breakfast  senna 2 Tablet(s) Oral at bedtime    MEDICATIONS  (PRN):  acetaminophen   Tablet. 650 milliGRAM(s) Oral every 6 hours PRN Moderate Pain (4 - 6)  polyethylene glycol 3350 17 Gram(s) Oral daily PRN Constipation  simethicone 80 milliGRAM(s) Chew three times a day PRN Gas        Objective:    Vitals: Vital Signs Last 24 Hrs  T(C): 36.6 (09-06-18 @ 04:37), Max: 36.7 (09-05-18 @ 13:29)  T(F): 97.8 (09-06-18 @ 04:37), Max: 98 (09-05-18 @ 13:29)  HR: 67 (09-06-18 @ 04:37) (67 - 68)  BP: 151/73 (09-06-18 @ 04:37) (125/70 - 157/75)  BP(mean): --  RR: 18 (09-06-18 @ 04:37) (18 - 18)  SpO2: 94% (09-06-18 @ 04:37) (93% - 95%)            I&O's Summary    05 Sep 2018 07:01  -  06 Sep 2018 07:00  --------------------------------------------------------  IN: 400 mL / OUT: 3 mL / NET: 397 mL        PHYSICAL EXAM:  GENERAL: NAD  HEAD:  Atraumatic, Normocephalic  EYES: EOMI, PERRLA, conjunctiva and sclera clear  CHEST/LUNG: Clear to percussion bilaterally; No rales, rhonchi, wheezing, or rubs  HEART: Regular rate and rhythm; No murmurs, rubs, or gallops  ABDOMEN: Soft, Nontender, Nondistended; Bowel sounds present  SKIN: No rashes or lesions  NERVOUS SYSTEM:  Alert & Oriented X3, no focal deficits                                     LABS:        09-06    142  |  103  |  18  ----------------------------<  87  3.7   |  27  |  0.65    Ca    9.6      06 Sep 2018 06:49  Phos  3.5     09-06  Mg     2.2     09-06    Radiology:  CT C/A/P 9/5    CHEST:     LUNGS AND LARGE AIRWAYS: Patent central airways. Biapical lung scarring.   No pulmonary nodules or masses.  PLEURA: No pleural effusion.  VESSELS: Aortic and coronary artery calcifications.  HEART: Heart size is normal. No pericardial effusion.  MEDIASTINUM AND PEDRO: No lymphadenopathy.  CHEST WALL AND LOWER NECK: Within normal limits.    ABDOMEN AND PELVIS:    LIVER: Scattered hepatic cysts are stable.  BILE DUCTS: Normal caliber.  GALLBLADDER: Within normal limits.  SPLEEN: Within normal limits.  PANCREAS: Stable 7 mm cystic lesion in the pancreatic head. No pancreatic   ductal dilatation.  ADRENALS: Stable 8 mm left adrenal nodule. Normal right adrenal gland.  KIDNEYS/URETERS: Complete and symmetric enhancement. Bilateral   hypoattenuating lesions are too small to characterize. No hydronephrosis.    BLADDER: Limited evaluation secondary to streak artifact from patient's   retained barium.  REPRODUCTIVE ORGANS: Not well visualized secondary to streak artifact   from patient's retained barium.    BOWEL: Retained barium contrast within the large bowel limits evaluation.   Diverticulosis without evidence of diverticulitis. No bowel obstruction.   Appendix not definitely visualized.  PERITONEUM: No ascites.  VESSELS:  Infrarenal saccular abdominal aortic aneurysm at the takeoff of   the NEELA measuring 3.3 cm (series 3 image 179) disease, previously   measuring 2.6 cm on CT abdomen and pelvis 6/2/2016. Views atherosclerotic   disease.  RETROPERITONEUM: No lymphadenopathy.    ABDOMINAL WALL: Within normal limits.  BONES: Diffuse demineralization. Degenerative changes of the spine.    IMPRESSION:     Increased size of infrarenal saccular abdominal aortic aneurysm now   measuring 3.3 cm, previously 2.6 cm on CT abdomen and pelvis 6/2/2016.    Slightly limited examination secondary to retained barium contrast in the   bowel with diffuse diverticulosis.

## 2018-09-06 NOTE — PROGRESS NOTE ADULT - PROBLEM SELECTOR PLAN 1
- S+S   - MBS done, recommending soft diet  - responding well to trial of PPI  - f/u GI outpatient - MBS done, recommending soft diet  - responding well to trial of PPI - will continue on discharge  - f/u GI outpatient

## 2018-09-06 NOTE — PROGRESS NOTE ADULT - REASON FOR ADMISSION
difficulty swallowing, weight loss

## 2018-09-06 NOTE — PROGRESS NOTE ADULT - ASSESSMENT
89 yo female with PMH of HTN, CAD s/p stent, PAD, gout, presents here with dysphagia, weight loss and difficulty ambulating. Appears to have progressive decline in functional status.
89 yo female with PMH of HTN, CAD s/p stent, PAD, gout, presents here with dysphagia, weight loss and difficulty ambulating. Appears to have progressive decline in functional status.
89 yo female with PMH of HTN, CAD s/p stent, PAD, gout, presents here with dysphagia, weight loss and difficulty ambulating. Appears to have progressive decline in functional status.
89 yo female with PMH of HTN, CAD s/p stent, PAD, gout, presents here with dysphagia, weight loss and difficulty ambulating. Appears to have progressive decline in functional status.
89 yo female with PMH of HTN, CAD s/p stent, PAD, gout, presents here with dysphagia, weight loss and difficulty ambulating. Appears to have progressive decline in functional status.
89 yo female with PMH of HTN, CAD s/p stent, PAD, gout, presents here with dysphagia, weight loss and difficulty ambulating. Appears to have progressive decline in functional status.

## 2018-09-06 NOTE — PROGRESS NOTE ADULT - PROBLEM SELECTOR PROBLEM 1
Dysphagia, unspecified type

## 2018-09-06 NOTE — PROGRESS NOTE ADULT - PROBLEM SELECTOR PLAN 2
Likely due to underlying dysphagia and difficulty tolerating PO. PO tolerance has improved on PPI. Also with more interest in eating since she is eating food in smaller amounts at a time and more slowly.  -RPR neg, TSH wnl  -vit d insufficiency noted. can start supplementation  -Patient lives with her sister who is currently in [facility] for [facility]  - PT evaluation pending- no weight bearing restrictions  - on soft diet  - Given degree of weight loss within the past month, concern for malignancy. CT C/A/P done today - mainly non-revealing.  - SW following - pt okay for discharge to [facility] Likely due to underlying dysphagia and difficulty tolerating PO. PO tolerance has improved on PPI. Also with more interest in eating since she is eating food in smaller amounts at a time and more slowly.  -RPR neg, TSH wnl  -vit d insufficiency noted. can start supplementation  -Patient lives with her sister who is currently in [facility] for [facility]  - PT evaluation - recommends [facility]  - on soft diet  - Given degree of weight loss within the past month, concern for malignancy. CT C/A/P done today - mainly non-revealing.  - SW following - pt okay for discharge to [facility]

## 2018-09-06 NOTE — PROGRESS NOTE ADULT - PROBLEM SELECTOR PROBLEM 2
Failure to thrive in adult

## 2018-09-06 NOTE — PROGRESS NOTE ADULT - PROBLEM SELECTOR PLAN 3
c/w Norvasc   - BP within 120-140s.
c/w Norvasc for now  if BP remains persistently elevated will add secondary agent
c/w Norvasc   - BP within 120-140s.
c/w Norvasc for now  if BP remains persistently elevated will add secondary agent
c/w Norvasc   - BP within 120-140s.

## 2018-11-03 ENCOUNTER — EMERGENCY (EMERGENCY)
Facility: HOSPITAL | Age: 83
LOS: 1 days | Discharge: HOME | End: 2018-11-03
Attending: EMERGENCY MEDICINE
Payer: MEDICARE

## 2018-11-03 VITALS
RESPIRATION RATE: 18 BRPM | HEART RATE: 74 BPM | OXYGEN SATURATION: 94 % | DIASTOLIC BLOOD PRESSURE: 78 MMHG | SYSTOLIC BLOOD PRESSURE: 175 MMHG

## 2018-11-03 VITALS
SYSTOLIC BLOOD PRESSURE: 149 MMHG | WEIGHT: 110.01 LBS | DIASTOLIC BLOOD PRESSURE: 80 MMHG | OXYGEN SATURATION: 94 % | HEIGHT: 66 IN | HEART RATE: 97 BPM | TEMPERATURE: 98 F | RESPIRATION RATE: 18 BRPM

## 2018-11-03 DIAGNOSIS — J38.1 POLYP OF VOCAL CORD AND LARYNX: Chronic | ICD-10-CM

## 2018-11-03 PROCEDURE — 70450 CT HEAD/BRAIN W/O DYE: CPT

## 2018-11-03 PROCEDURE — 71045 X-RAY EXAM CHEST 1 VIEW: CPT | Mod: 26

## 2018-11-03 PROCEDURE — 99284 EMERGENCY DEPT VISIT MOD MDM: CPT

## 2018-11-03 PROCEDURE — 71045 X-RAY EXAM CHEST 1 VIEW: CPT

## 2018-11-03 PROCEDURE — 72125 CT NECK SPINE W/O DYE: CPT

## 2018-11-03 PROCEDURE — 70450 CT HEAD/BRAIN W/O DYE: CPT | Mod: 26

## 2018-11-03 PROCEDURE — 73080 X-RAY EXAM OF ELBOW: CPT | Mod: 26,RT

## 2018-11-03 PROCEDURE — 72131 CT LUMBAR SPINE W/O DYE: CPT

## 2018-11-03 PROCEDURE — 72170 X-RAY EXAM OF PELVIS: CPT | Mod: 26

## 2018-11-03 PROCEDURE — 72131 CT LUMBAR SPINE W/O DYE: CPT | Mod: 26

## 2018-11-03 PROCEDURE — 72170 X-RAY EXAM OF PELVIS: CPT

## 2018-11-03 PROCEDURE — 73080 X-RAY EXAM OF ELBOW: CPT

## 2018-11-03 PROCEDURE — 72125 CT NECK SPINE W/O DYE: CPT | Mod: 26

## 2018-11-03 PROCEDURE — 99284 EMERGENCY DEPT VISIT MOD MDM: CPT | Mod: 25

## 2018-11-03 RX ORDER — ACETAMINOPHEN 500 MG
650 TABLET ORAL ONCE
Qty: 0 | Refills: 0 | Status: COMPLETED | OUTPATIENT
Start: 2018-11-03 | End: 2018-11-03

## 2018-11-03 RX ADMIN — Medication 650 MILLIGRAM(S): at 13:08

## 2018-11-03 RX ADMIN — Medication 650 MILLIGRAM(S): at 13:40

## 2018-11-03 NOTE — ED PROVIDER NOTE - ATTENDING CONTRIBUTION TO CARE
Kaylee Stone MD - Attending Physician: I have personally seen and examined this patient. I have discussed the case with the ACP. I have reviewed all pertinent clinical information, including history, physical exam, plan and the ACP’s note and agree except as noted. See MDM

## 2018-11-03 NOTE — ED PROVIDER NOTE - ENMT, MLM
NCAT, Airway patent, Nasal mucosa clear. Mouth with normal mucosa. Throat has no vesicles, no oropharyngeal exudates and uvula is midline. NC, small hematoma to parietal region. Airway patent, Nasal mucosa clear. Mouth with normal mucosa. Throat has no vesicles, no oropharyngeal exudates and uvula is midline.

## 2018-11-03 NOTE — ED PROVIDER NOTE - MEDICAL DECISION MAKING DETAILS
89 yo F with pmhx htn, cad(stents), PAD, gout, OA and skin ca presenting with ha and back pain sp trip and fall today. will obtain xrays and cts. Will give tylenol and reassess after results 89 yo F with pmhx htn, cad(stents), PAD, gout, OA and skin ca presenting with ha and back pain sp trip and fall today. will obtain xrays and cts. Will give tylenol and reassess after results    Kaylee Stone MD - Attending Physician: Pt here with mechanical fall. No LOC. +Headache, low back pain. Neuro intact. CT, Xr as ordered. Pain control as needed

## 2018-11-03 NOTE — ED ADULT NURSE NOTE - NSIMPLEMENTINTERV_GEN_ALL_ED
Implemented All Fall with Harm Risk Interventions:  [place] to call system. Call bell, personal items and telephone within reach. Instruct patient to call for assistance. Room bathroom lighting operational. Non-slip footwear when patient is off stretcher. Physically safe environment: no spills, clutter or unnecessary equipment. Stretcher in lowest position, wheels locked, appropriate side rails in place. Provide visual cue, wrist band, yellow gown, etc. Monitor gait and stability. Monitor for mental status changes and reorient to person, place, and time. Review medications for side effects contributing to fall risk. Reinforce activity limits and safety measures with patient and family. Provide visual clues: red socks.

## 2018-11-03 NOTE — ED PROVIDER NOTE - CARE PLAN
Principal Discharge DX:	Injury of head, initial encounter  Assessment and plan of treatment:	Follow up with your PMD within 48-72 hours.  Rest, Take Tylenol 650mg 1 tab every 4-6 hours as needed for pain .  Any nausea, vomiting, worsening pain, dizziness, changes in vision return to ER  Secondary Diagnosis:	Back strain, initial encounter

## 2018-11-03 NOTE — ED PROVIDER NOTE - PLAN OF CARE
Follow up with your PMD within 48-72 hours.  Rest, Take Tylenol 650mg 1 tab every 4-6 hours as needed for pain .  Any nausea, vomiting, worsening pain, dizziness, changes in vision return to ER

## 2018-11-03 NOTE — ED ADULT NURSE NOTE - OBJECTIVE STATEMENT
89 y/o F pt w/ pmh of HTN, PAD, glaucoma, hiatal hernia, present to ED for, head, sacral, right elbow pain s/p slip and fall, neg LOC, neg blood thinner, on exam pt A&Ox3, PERRL, equal b/l extremity strength, no vision changes, denies dizziness, numbness, tingling, + ROM in all extremities, no laceration, abrasion or deformities seen, VSS, seen and eval by MD

## 2018-11-03 NOTE — ED PROVIDER NOTE - OBJECTIVE STATEMENT
89 yo F with pmhx htn, cad(stents), PAD, gout, OA and skin ca presenting with ha and back pain sp trip and fall today 89 yo F with pmhx htn, cad(stents), PAD, gout, OA and skin ca presenting with ha and back pain sp trip and fall today. Patient states she tripped on "slippery" wooden floor and hit her head on the draw and fell on her lower back. Patient states she was unable to get up on her own until EMS came. Patient states she was ambulatory after she got up with EMS. Patient co 10/10 pain. Patient also states her right elbow hurts. Patient denies loc, neck pain, tingling, numbness, weakness, abd pain, nvd, syncope, cp, blurred vision, cough, sob, dysuria, hematuria and laceration

## 2018-11-03 NOTE — ED PROVIDER NOTE - PROGRESS NOTE DETAILS
Patient ambulatory with assistance. Feeling better. Patient unsure of how she is getting home. Doesn't have her walker with her. Will call for ambulette.

## 2018-11-03 NOTE — ED PROVIDER NOTE - NS CPE EDP MUSC LUMBAR LOC
tenderness/c/t/l spine- ttp around l4-s1. FROM of lumbar spine with slight discomfort with rotation and flexion. no other spinal tenderness. UE- ttp olecranon of the right elbow. no other bony tenderness. FROM. LE- FROM no ttp

## 2019-08-28 NOTE — H&P ADULT - PROBLEM SELECTOR PROBLEM 1
Your current Orthopedic problem we are working together to treat is:  Degenerative changes of lumbar spine  Mild hip arthritis    The suggested treatment that we have gone over today includes:   1. Continue physical therapy  2. A referral was placed to Dr. Olmos in spinal surgery call [phone] to schedule     Office hours are 8:00 am to 5:00 pm Monday through Friday.    If it is urgent that you speak with someone outside of these hours, our [facility] will be able to assist you.    You can reach the office by [phone].     Thank you for choosing [facility] as your Orthopedic provider!     
Dysphagia, unspecified type

## 2019-10-12 ENCOUNTER — EMERGENCY (EMERGENCY)
Facility: HOSPITAL | Age: 84
LOS: 1 days | Discharge: HOME | End: 2019-10-12
Attending: EMERGENCY MEDICINE
Payer: MEDICARE

## 2019-10-12 VITALS
SYSTOLIC BLOOD PRESSURE: 137 MMHG | OXYGEN SATURATION: 96 % | TEMPERATURE: 98 F | RESPIRATION RATE: 18 BRPM | DIASTOLIC BLOOD PRESSURE: 77 MMHG | HEART RATE: 77 BPM

## 2019-10-12 VITALS
SYSTOLIC BLOOD PRESSURE: 158 MMHG | OXYGEN SATURATION: 95 % | HEART RATE: 63 BPM | TEMPERATURE: 98 F | HEIGHT: 65 IN | RESPIRATION RATE: 16 BRPM | WEIGHT: 134.92 LBS | DIASTOLIC BLOOD PRESSURE: 71 MMHG

## 2019-10-12 DIAGNOSIS — J38.1 POLYP OF VOCAL CORD AND LARYNX: Chronic | ICD-10-CM

## 2019-10-12 PROCEDURE — 93970 EXTREMITY STUDY: CPT | Mod: 26

## 2019-10-12 PROCEDURE — 71045 X-RAY EXAM CHEST 1 VIEW: CPT

## 2019-10-12 PROCEDURE — 99284 EMERGENCY DEPT VISIT MOD MDM: CPT | Mod: 25

## 2019-10-12 PROCEDURE — 71045 X-RAY EXAM CHEST 1 VIEW: CPT | Mod: 26

## 2019-10-12 PROCEDURE — 99284 EMERGENCY DEPT VISIT MOD MDM: CPT | Mod: GC

## 2019-10-12 PROCEDURE — 93970 EXTREMITY STUDY: CPT

## 2019-10-12 RX ORDER — ACETAMINOPHEN 500 MG
650 TABLET ORAL ONCE
Refills: 0 | Status: COMPLETED | OUTPATIENT
Start: 2019-10-12 | End: 2019-10-12

## 2019-10-12 RX ADMIN — Medication 650 MILLIGRAM(S): at 15:53

## 2019-10-12 NOTE — ED PROVIDER NOTE - OBJECTIVE STATEMENT
Pt. is a 89 y.o. F w/ PMHx of HTN, PAD, SCC of leg p/w b/l LE swelling for the last few days.  Pt. states her legs are usually swollen but over the last few days it has increased and legs are tender.  Pt. unsure if she's had a DVT in the past, but states she may have.  Pt. is able to ambulate w/ a walker and has been able to do so w/ mild pain.  Pt. unsure what meds she takes.  Pt. denies any fevers, chills, SOB, CP, n/v/d, dysuria, hematuria, abdominal tenderness.  Pt. has not had an MI, CVA, PE in the past.

## 2019-10-12 NOTE — ED ADULT NURSE NOTE - INTERVENTIONS DEFINITIONS
Instruct patient to call for assistance/Non-slip footwear when patient is off stretcher/Physically safe environment: no spills, clutter or unnecessary equipment/Monitor for mental status changes and reorient to person, place, and time/Monitor gait and stability/Stretcher in lowest position, wheels locked, appropriate side rails in place

## 2019-10-12 NOTE — ED PROVIDER NOTE - NS ED ROS FT
General: denies fever, chills, weight loss/weight gain.  HENT: denies nasal congestion, sore throat, rhinorrhea, ear pain  Eyes: denies visual changes, blurred vision, eye discharge, eye redness  Neck: denies neck pain, neck swelling  CV: denies chest pain, palpitations  Resp: denies difficulty breathing, cough  Abdominal: denies nausea, vomiting, diarrhea, abdominal pain, blood in stool, dark stool  MSK: +leg pain, leg swelling; denies muscle aches, bony pain  Neuro: denies headaches, numbness, tingling, dizziness, lightheadedness.  Skin: denies rashes, cuts, bruises  Hematologic: denies unexplained bruises

## 2019-10-12 NOTE — ED ADULT NURSE REASSESSMENT NOTE - NS ED NURSE REASSESS COMMENT FT1
Report received from Iliaan MARTI. Patient is a/ox4, being d/c at this time. No acute distress. Leaving unit ambulatory with assistance. Safety maintained.

## 2019-10-12 NOTE — ED PROVIDER NOTE - CLINICAL SUMMARY MEDICAL DECISION MAKING FREE TEXT BOX
Pt. is a 89 y.o. F p/w b/l LE edema w/ tenderness.  Highest concern for DVT, although skin changes consistent w/ chronic venous stasis.  Will get U/S of b/l LE to r/o DVT.  Unlikely to be cellulitis given physical exam, lack of fevers or tachycardia.  If all results are unremarkable, will d/c.

## 2019-10-12 NOTE — ED PROVIDER NOTE - PHYSICAL EXAMINATION
GENERAL: Patient awake alert NAD.  HEENT: NC/AT, Moist mucous membranes, PERRL, EOMI.  LUNGS: CTAB, no wheezes or crackles.   CARDIAC: RRR, no m/r/g.    ABDOMEN: Soft, NT, ND, No rebound, guarding. No CVA tenderness.   EXT: 2+ pitting edema b/l, calf tenderness. Erythematous b/l LE consistent w/ chronic venous stasis. CV 2+DP/PT bilaterally.   MSK: No spinal tenderness, no pain with movement, no deformities.  NEURO: A&Ox3. Moving all extremities.  SKIN: Warm and dry. No rash.  PSYCH: Normal affect.

## 2019-10-12 NOTE — ED ADULT NURSE NOTE - OBJECTIVE STATEMENT
89 female brought in by ambulance complaining of bilateral lower leg swelling, redness and pain for 3 days. denies trauma, no cuts or bruising noted. pain is more acute when touching or moving the legs, but more tolerable when she is laying still. VSS, denies CP, SOB, no fever/chills. states the legs have been weeping "thick liquid." friend at the bedside, examined by MD, pending US. Comfort maintained.

## 2019-10-12 NOTE — ED PROVIDER NOTE - PROGRESS NOTE DETAILS
[facility] PGY1 - Discussed results w/ pt.  Reassessed pt., who is feeling better.  Pt. agrees w/ d/c w/ PCP f/u and return precautions.  All questions and concerns have been addressed.

## 2019-10-12 NOTE — ED PROVIDER NOTE - NSFOLLOWUPINSTRUCTIONS_ED_ALL_ED_FT
Your leg swelling is not due to a blood clot.  Please wear compression stockings or socks and keep your foot elevated as this well help the fluid to leave your legs.    Please follow up with your primary care physician and bring a copy of your results with you.    If you start developing any chest pain, difficulty breathing, coughing up blood, or have any other concerning symptoms, please return immediately to the emergency department.

## 2019-10-12 NOTE — ED PROVIDER NOTE - PATIENT PORTAL LINK FT
You can access the FollowMyHealth Patient Portal offered by [facility] by registering at the following website: [email or web address]. By joining [facility]’s FollowMyHealth portal, you will also be able to view your health information using other applications (apps) compatible with our system.

## 2019-10-12 NOTE — ED PROVIDER NOTE - ATTENDING CONTRIBUTION TO CARE
89 yo F presents with bl leg swelling for the past 3 days. she states that she chronically has leg swelling, but it is worse in the past 3 days. reports that she has chronic venous insufficiency and so legs are more swollen by the end of the day, but noticed it is worse. no pain in the legs unless palpated. has NO other complaints.   denies f/ch, cp, sob, abd pain, N/V/D, urinary complaints, cough, weakness/numnbess  not on diuretics  PE well appearing. lungs CTA. abd soft and NT. bl legs with chronic venous stasis changes, 2+ pitting edema bl. calves are equal in size. 1+ PP. capillary refill <2sec. strength/sensation intact.     pt given tylenol for pain. 89 yo F presents with bl leg swelling for the past 3 days. she states that she chronically has leg swelling, but it is worse in the past 3 days. reports that she has chronic venous insufficiency and so legs are more swollen by the end of the day, but noticed it is worse. no pain in the legs unless palpated. has NO other complaints.   denies f/ch, cp, sob, abd pain, N/V/D, urinary complaints, cough, weakness/numnbess  not on diuretics  PE well appearing. lungs CTA. abd soft and NT. bl legs with chronic venous stasis changes, 2+ pitting edema bl. calves are equal in size. 1+ PP. capillary refill <2sec. strength/sensation intact.     pt given tylenol for pain. us negative for dvt. cxr clear. pt ambulaing around the er. has no complaints. stable vs. normal ms. appears safe for dc. Patient was discharged with instructions to take tylenol for pain, kepe legs elevated, and follow up with PMD in 1-2 days for repeat evaluation and further management.    I reviewed all results from this ED visit, and discussed ALL results with the patient and/or family, including all abnormal results and incidental findings. All questions/concerns were addressed, and I recommended appropriate follow up for all findings. All discharge instructions were thoroughly discussed with the patient and/or family, as well as important warning signs and new/ worsening symptoms which should necessitate patient's immediate return to the ED. The patient expressed understanding of all results discussed and follow up instructions given.The patient was agreeable with discharge and was discharged from the ED in stable condition.

## 2020-02-26 ENCOUNTER — INPATIENT (INPATIENT)
Facility: HOSPITAL | Age: 85
LOS: 4 days | Discharge: NURSING, REHAB OR LONG-TERM CARE | DRG: 535 | End: 2020-03-02
Attending: HOSPITALIST | Admitting: STUDENT IN AN ORGANIZED HEALTH CARE EDUCATION/TRAINING PROGRAM
Payer: MEDICARE

## 2020-02-26 VITALS
OXYGEN SATURATION: 95 % | WEIGHT: 130.07 LBS | TEMPERATURE: 97 F | DIASTOLIC BLOOD PRESSURE: 76 MMHG | SYSTOLIC BLOOD PRESSURE: 154 MMHG | HEART RATE: 84 BPM | RESPIRATION RATE: 18 BRPM

## 2020-02-26 DIAGNOSIS — I25.10 ATHEROSCLEROTIC HEART DISEASE OF NATIVE CORONARY ARTERY WITHOUT ANGINA PECTORIS: ICD-10-CM

## 2020-02-26 DIAGNOSIS — Z02.9 ENCOUNTER FOR ADMINISTRATIVE EXAMINATIONS, UNSPECIFIED: ICD-10-CM

## 2020-02-26 DIAGNOSIS — Z29.9 ENCOUNTER FOR PROPHYLACTIC MEASURES, UNSPECIFIED: ICD-10-CM

## 2020-02-26 DIAGNOSIS — I71.4 ABDOMINAL AORTIC ANEURYSM, WITHOUT RUPTURE: ICD-10-CM

## 2020-02-26 DIAGNOSIS — S72.001A FRACTURE OF UNSPECIFIED PART OF NECK OF RIGHT FEMUR, INITIAL ENCOUNTER FOR CLOSED FRACTURE: ICD-10-CM

## 2020-02-26 DIAGNOSIS — J38.1 POLYP OF VOCAL CORD AND LARYNX: Chronic | ICD-10-CM

## 2020-02-26 DIAGNOSIS — S72.009A FRACTURE OF UNSPECIFIED PART OF NECK OF UNSPECIFIED FEMUR, INITIAL ENCOUNTER FOR CLOSED FRACTURE: ICD-10-CM

## 2020-02-26 DIAGNOSIS — R63.4 ABNORMAL WEIGHT LOSS: ICD-10-CM

## 2020-02-26 LAB
ALBUMIN SERPL ELPH-MCNC: 4.2 G/DL — SIGNIFICANT CHANGE UP (ref 3.3–5)
ALP SERPL-CCNC: 76 U/L — SIGNIFICANT CHANGE UP (ref 40–120)
ALT FLD-CCNC: 13 U/L — SIGNIFICANT CHANGE UP (ref 10–45)
ANION GAP SERPL CALC-SCNC: 13 MMOL/L — SIGNIFICANT CHANGE UP (ref 5–17)
APTT BLD: 33 SEC — SIGNIFICANT CHANGE UP (ref 27.5–36.3)
AST SERPL-CCNC: 24 U/L — SIGNIFICANT CHANGE UP (ref 10–40)
BASE EXCESS BLDV CALC-SCNC: 4.6 MMOL/L — HIGH (ref -2–2)
BASOPHILS # BLD AUTO: 0.04 K/UL — SIGNIFICANT CHANGE UP (ref 0–0.2)
BASOPHILS NFR BLD AUTO: 0.4 % — SIGNIFICANT CHANGE UP (ref 0–2)
BILIRUB SERPL-MCNC: 0.4 MG/DL — SIGNIFICANT CHANGE UP (ref 0.2–1.2)
BLD GP AB SCN SERPL QL: NEGATIVE — SIGNIFICANT CHANGE UP
BUN SERPL-MCNC: 16 MG/DL — SIGNIFICANT CHANGE UP (ref 7–23)
CA-I SERPL-SCNC: 1.22 MMOL/L — SIGNIFICANT CHANGE UP (ref 1.12–1.3)
CALCIUM SERPL-MCNC: 9.8 MG/DL — SIGNIFICANT CHANGE UP (ref 8.4–10.5)
CHLORIDE BLDV-SCNC: 104 MMOL/L — SIGNIFICANT CHANGE UP (ref 96–108)
CHLORIDE SERPL-SCNC: 103 MMOL/L — SIGNIFICANT CHANGE UP (ref 96–108)
CK SERPL-CCNC: 547 U/L — HIGH (ref 25–170)
CO2 BLDV-SCNC: 33 MMOL/L — HIGH (ref 22–30)
CO2 SERPL-SCNC: 29 MMOL/L — SIGNIFICANT CHANGE UP (ref 22–31)
CREAT SERPL-MCNC: 0.78 MG/DL — SIGNIFICANT CHANGE UP (ref 0.5–1.3)
EOSINOPHIL # BLD AUTO: 0.11 K/UL — SIGNIFICANT CHANGE UP (ref 0–0.5)
EOSINOPHIL NFR BLD AUTO: 1.1 % — SIGNIFICANT CHANGE UP (ref 0–6)
GAS PNL BLDV: 141 MMOL/L — SIGNIFICANT CHANGE UP (ref 135–145)
GAS PNL BLDV: SIGNIFICANT CHANGE UP
GAS PNL BLDV: SIGNIFICANT CHANGE UP
GLUCOSE BLDV-MCNC: 97 MG/DL — SIGNIFICANT CHANGE UP (ref 70–99)
GLUCOSE SERPL-MCNC: 100 MG/DL — HIGH (ref 70–99)
HCO3 BLDV-SCNC: 31 MMOL/L — HIGH (ref 21–29)
HCT VFR BLD CALC: 44.5 % — SIGNIFICANT CHANGE UP (ref 34.5–45)
HCT VFR BLDA CALC: 45 % — SIGNIFICANT CHANGE UP (ref 39–50)
HGB BLD CALC-MCNC: 14.6 G/DL — SIGNIFICANT CHANGE UP (ref 11.5–15.5)
HGB BLD-MCNC: 13.8 G/DL — SIGNIFICANT CHANGE UP (ref 11.5–15.5)
HOROWITZ INDEX BLDV+IHG-RTO: SIGNIFICANT CHANGE UP
IMM GRANULOCYTES NFR BLD AUTO: 0.4 % — SIGNIFICANT CHANGE UP (ref 0–1.5)
INR BLD: 1.03 RATIO — SIGNIFICANT CHANGE UP (ref 0.88–1.16)
LACTATE BLDV-MCNC: 1.1 MMOL/L — SIGNIFICANT CHANGE UP (ref 0.7–2)
LYMPHOCYTES # BLD AUTO: 1.36 K/UL — SIGNIFICANT CHANGE UP (ref 1–3.3)
LYMPHOCYTES # BLD AUTO: 14.1 % — SIGNIFICANT CHANGE UP (ref 13–44)
MCHC RBC-ENTMCNC: 29.7 PG — SIGNIFICANT CHANGE UP (ref 27–34)
MCHC RBC-ENTMCNC: 31 GM/DL — LOW (ref 32–36)
MCV RBC AUTO: 95.9 FL — SIGNIFICANT CHANGE UP (ref 80–100)
MONOCYTES # BLD AUTO: 0.62 K/UL — SIGNIFICANT CHANGE UP (ref 0–0.9)
MONOCYTES NFR BLD AUTO: 6.4 % — SIGNIFICANT CHANGE UP (ref 2–14)
NEUTROPHILS # BLD AUTO: 7.48 K/UL — HIGH (ref 1.8–7.4)
NEUTROPHILS NFR BLD AUTO: 77.6 % — HIGH (ref 43–77)
NRBC # BLD: 0 /100 WBCS — SIGNIFICANT CHANGE UP (ref 0–0)
PCO2 BLDV: 56 MMHG — HIGH (ref 35–50)
PH BLDV: 7.36 — SIGNIFICANT CHANGE UP (ref 7.35–7.45)
PLATELET # BLD AUTO: 250 K/UL — SIGNIFICANT CHANGE UP (ref 150–400)
PO2 BLDV: 22 MMHG — LOW (ref 25–45)
POTASSIUM BLDV-SCNC: 4.7 MMOL/L — SIGNIFICANT CHANGE UP (ref 3.5–5.3)
POTASSIUM SERPL-MCNC: 4 MMOL/L — SIGNIFICANT CHANGE UP (ref 3.5–5.3)
POTASSIUM SERPL-SCNC: 4 MMOL/L — SIGNIFICANT CHANGE UP (ref 3.5–5.3)
PROT SERPL-MCNC: 7.7 G/DL — SIGNIFICANT CHANGE UP (ref 6–8.3)
PROTHROM AB SERPL-ACNC: 11.9 SEC — SIGNIFICANT CHANGE UP (ref 10–12.9)
RBC # BLD: 4.64 M/UL — SIGNIFICANT CHANGE UP (ref 3.8–5.2)
RBC # FLD: 14.8 % — HIGH (ref 10.3–14.5)
RH IG SCN BLD-IMP: NEGATIVE — SIGNIFICANT CHANGE UP
SAO2 % BLDV: 30 % — LOW (ref 67–88)
SODIUM SERPL-SCNC: 145 MMOL/L — SIGNIFICANT CHANGE UP (ref 135–145)
WBC # BLD: 9.65 K/UL — SIGNIFICANT CHANGE UP (ref 3.8–10.5)
WBC # FLD AUTO: 9.65 K/UL — SIGNIFICANT CHANGE UP (ref 3.8–10.5)

## 2020-02-26 PROCEDURE — 99223 1ST HOSP IP/OBS HIGH 75: CPT

## 2020-02-26 PROCEDURE — 72125 CT NECK SPINE W/O DYE: CPT | Mod: 26

## 2020-02-26 PROCEDURE — 70450 CT HEAD/BRAIN W/O DYE: CPT | Mod: 26

## 2020-02-26 PROCEDURE — 73721 MRI JNT OF LWR EXTRE W/O DYE: CPT | Mod: 26,RT

## 2020-02-26 PROCEDURE — 99285 EMERGENCY DEPT VISIT HI MDM: CPT

## 2020-02-26 PROCEDURE — 73523 X-RAY EXAM HIPS BI 5/> VIEWS: CPT | Mod: 26

## 2020-02-26 PROCEDURE — 71045 X-RAY EXAM CHEST 1 VIEW: CPT | Mod: 26

## 2020-02-26 PROCEDURE — 72131 CT LUMBAR SPINE W/O DYE: CPT | Mod: 26

## 2020-02-26 PROCEDURE — 93010 ELECTROCARDIOGRAM REPORT: CPT

## 2020-02-26 PROCEDURE — 73552 X-RAY EXAM OF FEMUR 2/>: CPT | Mod: 26,RT

## 2020-02-26 RX ORDER — ACETAMINOPHEN 500 MG
650 TABLET ORAL EVERY 6 HOURS
Refills: 0 | Status: DISCONTINUED | OUTPATIENT
Start: 2020-02-26 | End: 2020-02-27

## 2020-02-26 RX ORDER — HEPARIN SODIUM 5000 [USP'U]/ML
5000 INJECTION INTRAVENOUS; SUBCUTANEOUS EVERY 12 HOURS
Refills: 0 | Status: DISCONTINUED | OUTPATIENT
Start: 2020-02-26 | End: 2020-03-02

## 2020-02-26 RX ORDER — PANTOPRAZOLE SODIUM 20 MG/1
40 TABLET, DELAYED RELEASE ORAL
Refills: 0 | Status: DISCONTINUED | OUTPATIENT
Start: 2020-02-26 | End: 2020-03-02

## 2020-02-26 RX ORDER — ACETAMINOPHEN 500 MG
975 TABLET ORAL ONCE
Refills: 0 | Status: COMPLETED | OUTPATIENT
Start: 2020-02-26 | End: 2020-02-26

## 2020-02-26 RX ORDER — LOSARTAN POTASSIUM 100 MG/1
100 TABLET, FILM COATED ORAL DAILY
Refills: 0 | Status: DISCONTINUED | OUTPATIENT
Start: 2020-02-27 | End: 2020-03-02

## 2020-02-26 RX ORDER — SODIUM CHLORIDE 9 MG/ML
1000 INJECTION INTRAMUSCULAR; INTRAVENOUS; SUBCUTANEOUS
Refills: 0 | Status: DISCONTINUED | OUTPATIENT
Start: 2020-02-26 | End: 2020-02-26

## 2020-02-26 RX ADMIN — Medication 975 MILLIGRAM(S): at 12:47

## 2020-02-26 RX ADMIN — Medication 975 MILLIGRAM(S): at 19:53

## 2020-02-26 RX ADMIN — SODIUM CHLORIDE 75 MILLILITER(S): 9 INJECTION INTRAMUSCULAR; INTRAVENOUS; SUBCUTANEOUS at 19:53

## 2020-02-26 NOTE — H&P ADULT - PROBLEM SELECTOR PLAN 6
Transitions of Care Status:  1.  Name of PCP:    Pravin Roth MD [phone]  2.  PCP Contacted on Admission: [ ] Y    [ x] N    3.  PCP contacted at Discharge: [ ] Y    [ ] N    [ ] N/A  4.  Post-Discharge Appointment Date and Location:  5.  Summary of Handoff given to PCP:

## 2020-02-26 NOTE — ED PROVIDER NOTE - PROGRESS NOTE DETAILS
Spoke to radiology patient has confirmed right hip fracture. patient is requesting Dr. Monahan, who states patient needs MRI to determine if operative per Dr. Garces. Will admit to medicine. -Gail Friedman PA-C Attending MD [place]: ADmitted to medicine as per ortho

## 2020-02-26 NOTE — ED ADULT NURSE REASSESSMENT NOTE - NS ED NURSE REASSESS COMMENT FT1
Patient provided with meal tray. Patients Sania Montañoe called ([phone]), spoke to patient about plan of care.
ortho resident at bedside.
1927 pt still in mri pending bed/gc

## 2020-02-26 NOTE — H&P ADULT - ASSESSMENT
NIGHT HOSPITALIST:  RIGHT hip fracture--seen by ortho above--s/P MRI hip with ortho to review options with patient/niece.   Will obtain an echo to assess LV function with past CAD.   AAA is asymptomatic but will obtain an abdominal US.

## 2020-02-26 NOTE — H&P ADULT - NSHPREVIEWOFSYSTEMS_GEN_ALL_CORE
NO LOC, no HA, no focal weakness.  NO chest pain/pressure.  NO palpitations.  NO fever, no chills, no rigors.  No abdominal pain, no red blood per rectum or melena.  No back pain, no tearing back pain.    Patient with unspecified weight loss.  NO cough, no wheezing.  NO vaginal bleeding.  No rash.  NO SI/HI.

## 2020-02-26 NOTE — H&P ADULT - PROBLEM SELECTOR PLAN 4
Would contact patient's office physician  above on recent outpatient workup.  Nutrition evaluation. S/S evaluation.

## 2020-02-26 NOTE — H&P ADULT - NSHPSOCIALHISTORY_GEN_ALL_CORE
No tobacco or ethanol history.  Single, resident at the [facility].   Supportive adult niece.

## 2020-02-26 NOTE — ED PROVIDER NOTE - OBJECTIVE STATEMENT
89 yo F with pmhx htn, cad(stents), PAD, gout, OA and skin ca presenting with right hip s/p unwitnessed fall last night. As per staff patient found in her closet with right arm laceration, staff helped patient back to bed. This morning while at breakfast patient ambulatory but complaining of right hip pain. Patient denies loc, neck pain, tingling, numbness, weakness, abd pain, nvd, syncope, cp, blurred vision, cough, sob, dysuria, hematuria and laceration
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
## 2020-02-26 NOTE — H&P ADULT - ATTENDING COMMENTS
NIGHT HOSPITALIST:   Patient / niece aware of course and agree with plan/care as above.   Given patient's comorbidities, patient's long term prognosis is guarded.   Emotional support provided to patient/ niece.  Patient/ niece are not yet ready to discuss advance directives.   Care reviewed with covering NP/PAAngella.   Care assumed by the DAY HOSPITALIST.    Jorge Guerrier MD  [phone]

## 2020-02-26 NOTE — H&P ADULT - NSICDXFAMILYHX_GEN_ALL_CORE_FT
FAMILY HISTORY:  Sibling  Still living? Unknown  Family history of cancer, Age at diagnosis: Age Unknown
normal...

## 2020-02-26 NOTE — ED PROVIDER NOTE - ATTENDING CONTRIBUTION TO CARE
89yo female hx HTN, GERD presents s/p unwitnessed fall yesterday at the [facility]. Pt unsure of how she fell. She was noted to have a R elbow skin tear and helped back into her bed by staff ? when. Today patient complained of right hip pain and they called EMS. Patient was ambulatory today.   On exam, Patient is awake, alert x 3.  GCS15. NCAT, PERRL.  MM Dry.  Chest is clear to auscultation. +S1S2.  Abdomen is soft nondistended/nontender +BS. No rebound or guarding.   Pelvis is stable. R hip + tender. L hip full ROM.  + C and L spine tender  R elbow skin tear. Full ROM, nv intact.  Check labs and CT to eval for injury.

## 2020-02-26 NOTE — H&P ADULT - NSHPLABSRESULTS_GEN_ALL_CORE
WBC 9.6.   Hgb 13.8.    Platelets of 250K    INR 1.0.    K+ 4.0.    Random  glucose of 100.    Cr 0.7.  alb 4.2.    Troponin 21.    EKG tracing reviewed with NSR at 68.  Q V1V2.  Flattened T waves V2-V3.  Chest radiograph reviewed with no infiltrate or  effusion.    CTT head and neck negative.    LS CTT with DJD and 3.2 cm AAA noted.    US abdomen ordered. WBC 9.6.   Hgb 13.8.    Platelets of 250K    INR 1.0.    K+ 4.0.    Random  glucose of 100.    Cr 0.7.  alb 4.2.    Troponin 21.    EKG tracing reviewed with NSR at 68.  Q V1V2.  Flattened T waves V2-V3.  Chest radiograph reviewed with no infiltrate or  effusion.    CTT head and neck negative.    LS CTT with DJD and 3.2 cm AAA noted.    US abdomen ordered.    Radiograph with RIGHT greater intertrochanteric fracture

## 2020-02-26 NOTE — ED PROVIDER NOTE - PHYSICAL EXAMINATION
tenderness to palpation on right and left hip.   tenderness, c/t/l spine- ttp around l4-s1. FROM of lumbar spine with slight discomfort with rotation and flexion.  URE- laceration on elbow no other bony tenderness. FROM. LE- FROM no ttp

## 2020-02-26 NOTE — H&P ADULT - NSHPSOURCEINFOTX_GEN_ALL_CORE
Reviewed Medex from the [facility]. Reviewed Medex from the [facility].   History corroborated with patient's niece, Josee Singh, [phone]

## 2020-02-26 NOTE — CHART NOTE - NSCHARTNOTEFT_GEN_A_CORE
EMERGENCY : LMSW contacted by patient's RN Case Manager at the [facility] of [facility] (PH: [phone]). Ana states that [facility] called 911 after patient fell. Ana contacting LMSW to inquire about whether patient is going to be admitted or not. LMSW explained that patient's dispo is still pending work up. Ana requesting that when patient is ready for discharge that she be contacted as well as Ellie Cardenas RN () PH: [phone]/ Fax: [phone]. Should patient need any new prescriptions they should be sent to [facility] ([place], [phone]). Ana also states that patient's healthcare proxy and emergency contact is her niece, Josee Singh (PH: [phone]). [facility] states patient's niece made aware of ED visit. LMSW provided  with contact information and ensured ongoing SW availability. SW to remain available.
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
## 2020-02-26 NOTE — ED ADULT NURSE NOTE - SUICIDE SCREENING QUESTION 2
Problem: Patient Care Overview (Pediatrics)  Goal: Plan of Care Review  Outcome: Ongoing (interventions implemented as appropriate)    06/01/17 0991   Coping/Psychosocial   Plan Of Care Reviewed With patient   Patient Care Overview   Progress improving   Outcome Evaluation   Outcome Summary/Follow up Plan meets criteria for dischaarge            No

## 2020-02-26 NOTE — H&P ADULT - NSHPPHYSICALEXAM_GEN_ALL_CORE
Physical exam with a thin, elderly, cachectic F, AxOx3.   Impaired hearing.    Afebrile.   HR  68   RR 14   BP  123/64  97% on RA    HEENT< PERRL< EOMI, oropharynx clear  Neck supple  No thyromegaly  Chest bony rib cage, clear lungs.  Cor s1 s2  Declined breast exam.  Abdomen scaphoid, soft, nontender, no appreciable pulsatile mass. normal bowel sounds.  Skin dry.  Ext with RIGHT leg unable to lift from gurney.  2 cm crescent shaped abrasion RIGHT lateral elbow.  Neurologic exam AxOx3.   Speech fluent.   Cognition intact.  Mildly impaired short term recall.  UE/LE 5/5.  Gait not tested in the ER  NO SI/HI>

## 2020-02-26 NOTE — ED ADULT NURSE NOTE - OBJECTIVE STATEMENT
89 year old female presents to ED via EMS from The [facility] ([address], [place] [address] - [phone]), complaining of right hip pain after fall last night. History of HTN, GERD, PAD,  Neuropathy, carcinoma of leg. Per EMS patient had unwitnissed fall last night, was assisted back to bed - today complaining of right hip pain. Patient was able to ambulate independently to get to ambulance stretcher. 89 year old female presents to ED via EMS from The [facility] ([address], [place] [address] - [phone]), complaining of right hip pain after fall last night. History of HTN, GERD, PAD,  Neuropathy, carcinoma of leg. Per EMS patient had unwitnessed fall last night, was assisted back to bed - today complaining of right hip pain. Patient was able to ambulate independently to get to ambulance stretcher. Patient now denies any pain, denies pain medication at time of initial assessment, denies headache, chest pain, shortness of breath, abdominal pain, NVD, fevers, chills.

## 2020-02-26 NOTE — H&P ADULT - HISTORY OF PRESENT ILLNESS
NIGHT HOSPITALIST:   Patient UNKNOWN to me previously, assigned to me at this point via the ER to admit this 89 y/o F--patient is a resident at the [facility]--followed by her physician above--patient with a history of essential HTN, CAD with past stent, PAD, reported gout, past undifferentiated dysphagia, unspecified catabolic state, last admitted to [facility] in August 2018, known AAA, with NIGHT HOSPITALIST:   Patient UNKNOWN to me previously, assigned to me at this point via the ER to admit this 89 y/o F--patient is a resident at the [facility]--followed by her physician above--patient with a history of essential HTN, CAD with past stent, PAD, reported gout, past undifferentiated dysphagia, unspecified catabolic state, last admitted to [facility] in August 2018, known AAA, with patient with a presumed mechanical fall at the [facility], with the patient noted in patient's closet, attempting to ambulate but unable due to RIGHT hip pain. NIGHT HOSPITALIST:   Patient UNKNOWN to me previously, assigned to me at this point via the ER to admit this 89 y/o F--patient is a resident at the [facility]--followed by her physician above--history corroborated with patient's niece above--patient with a history of essential HTN, CAD with past stent, PAD, reported gout, past undifferentiated dysphagia, unspecified catabolic state, last admitted to [facility] in August 2018, known AAA, with patient with a presumed mechanical fall at the [facility], with the patient noted in patient's closet, attempting to ambulate but unable due to RIGHT hip pain.

## 2020-02-27 ENCOUNTER — TRANSCRIPTION ENCOUNTER (OUTPATIENT)
Age: 85
End: 2020-02-27

## 2020-02-27 LAB
ANION GAP SERPL CALC-SCNC: 11 MMOL/L — SIGNIFICANT CHANGE UP (ref 5–17)
APPEARANCE UR: CLEAR — SIGNIFICANT CHANGE UP
BASOPHILS # BLD AUTO: 0.08 K/UL — SIGNIFICANT CHANGE UP (ref 0–0.2)
BASOPHILS NFR BLD AUTO: 1 % — SIGNIFICANT CHANGE UP (ref 0–2)
BILIRUB UR-MCNC: NEGATIVE — SIGNIFICANT CHANGE UP
BUN SERPL-MCNC: 12 MG/DL — SIGNIFICANT CHANGE UP (ref 7–23)
CALCIUM SERPL-MCNC: 9 MG/DL — SIGNIFICANT CHANGE UP (ref 8.4–10.5)
CHLORIDE SERPL-SCNC: 105 MMOL/L — SIGNIFICANT CHANGE UP (ref 96–108)
CO2 SERPL-SCNC: 27 MMOL/L — SIGNIFICANT CHANGE UP (ref 22–31)
COLOR SPEC: YELLOW — SIGNIFICANT CHANGE UP
CREAT SERPL-MCNC: 0.63 MG/DL — SIGNIFICANT CHANGE UP (ref 0.5–1.3)
DIFF PNL FLD: NEGATIVE — SIGNIFICANT CHANGE UP
EOSINOPHIL # BLD AUTO: 0.71 K/UL — HIGH (ref 0–0.5)
EOSINOPHIL NFR BLD AUTO: 9.1 % — HIGH (ref 0–6)
GLUCOSE SERPL-MCNC: 95 MG/DL — SIGNIFICANT CHANGE UP (ref 70–99)
GLUCOSE UR QL: NEGATIVE — SIGNIFICANT CHANGE UP
HCT VFR BLD CALC: 40.5 % — SIGNIFICANT CHANGE UP (ref 34.5–45)
HGB BLD-MCNC: 12.5 G/DL — SIGNIFICANT CHANGE UP (ref 11.5–15.5)
IMM GRANULOCYTES NFR BLD AUTO: 0.1 % — SIGNIFICANT CHANGE UP (ref 0–1.5)
KETONES UR-MCNC: NEGATIVE — SIGNIFICANT CHANGE UP
LEUKOCYTE ESTERASE UR-ACNC: NEGATIVE — SIGNIFICANT CHANGE UP
LYMPHOCYTES # BLD AUTO: 1.77 K/UL — SIGNIFICANT CHANGE UP (ref 1–3.3)
LYMPHOCYTES # BLD AUTO: 22.6 % — SIGNIFICANT CHANGE UP (ref 13–44)
MCHC RBC-ENTMCNC: 29.8 PG — SIGNIFICANT CHANGE UP (ref 27–34)
MCHC RBC-ENTMCNC: 30.9 GM/DL — LOW (ref 32–36)
MCV RBC AUTO: 96.4 FL — SIGNIFICANT CHANGE UP (ref 80–100)
MONOCYTES # BLD AUTO: 0.72 K/UL — SIGNIFICANT CHANGE UP (ref 0–0.9)
MONOCYTES NFR BLD AUTO: 9.2 % — SIGNIFICANT CHANGE UP (ref 2–14)
NEUTROPHILS # BLD AUTO: 4.54 K/UL — SIGNIFICANT CHANGE UP (ref 1.8–7.4)
NEUTROPHILS NFR BLD AUTO: 58 % — SIGNIFICANT CHANGE UP (ref 43–77)
NITRITE UR-MCNC: NEGATIVE — SIGNIFICANT CHANGE UP
NRBC # BLD: 0 /100 WBCS — SIGNIFICANT CHANGE UP (ref 0–0)
PH UR: 8 — SIGNIFICANT CHANGE UP (ref 5–8)
PLATELET # BLD AUTO: 201 K/UL — SIGNIFICANT CHANGE UP (ref 150–400)
POTASSIUM SERPL-MCNC: 3.9 MMOL/L — SIGNIFICANT CHANGE UP (ref 3.5–5.3)
POTASSIUM SERPL-SCNC: 3.9 MMOL/L — SIGNIFICANT CHANGE UP (ref 3.5–5.3)
PROT UR-MCNC: SIGNIFICANT CHANGE UP
RBC # BLD: 4.2 M/UL — SIGNIFICANT CHANGE UP (ref 3.8–5.2)
RBC # FLD: 14.6 % — HIGH (ref 10.3–14.5)
SODIUM SERPL-SCNC: 143 MMOL/L — SIGNIFICANT CHANGE UP (ref 135–145)
SP GR SPEC: 1.03 — HIGH (ref 1.01–1.02)
UROBILINOGEN FLD QL: SIGNIFICANT CHANGE UP
WBC # BLD: 7.83 K/UL — SIGNIFICANT CHANGE UP (ref 3.8–10.5)
WBC # FLD AUTO: 7.83 K/UL — SIGNIFICANT CHANGE UP (ref 3.8–10.5)

## 2020-02-27 PROCEDURE — 76770 US EXAM ABDO BACK WALL COMP: CPT | Mod: 26

## 2020-02-27 PROCEDURE — 93306 TTE W/DOPPLER COMPLETE: CPT | Mod: 26

## 2020-02-27 PROCEDURE — 99233 SBSQ HOSP IP/OBS HIGH 50: CPT

## 2020-02-27 RX ORDER — SENNA PLUS 8.6 MG/1
2 TABLET ORAL AT BEDTIME
Refills: 0 | Status: DISCONTINUED | OUTPATIENT
Start: 2020-02-27 | End: 2020-03-02

## 2020-02-27 RX ORDER — OXYCODONE HYDROCHLORIDE 5 MG/1
5 TABLET ORAL EVERY 4 HOURS
Refills: 0 | Status: DISCONTINUED | OUTPATIENT
Start: 2020-02-27 | End: 2020-02-28

## 2020-02-27 RX ORDER — ACETAMINOPHEN 500 MG
650 TABLET ORAL EVERY 6 HOURS
Refills: 0 | Status: DISCONTINUED | OUTPATIENT
Start: 2020-02-27 | End: 2020-03-02

## 2020-02-27 RX ADMIN — LOSARTAN POTASSIUM 100 MILLIGRAM(S): 100 TABLET, FILM COATED ORAL at 06:11

## 2020-02-27 RX ADMIN — SENNA PLUS 2 TABLET(S): 8.6 TABLET ORAL at 21:12

## 2020-02-27 RX ADMIN — HEPARIN SODIUM 5000 UNIT(S): 5000 INJECTION INTRAVENOUS; SUBCUTANEOUS at 18:57

## 2020-02-27 RX ADMIN — Medication 650 MILLIGRAM(S): at 01:00

## 2020-02-27 RX ADMIN — Medication 650 MILLIGRAM(S): at 00:32

## 2020-02-27 RX ADMIN — OXYCODONE HYDROCHLORIDE 5 MILLIGRAM(S): 5 TABLET ORAL at 12:52

## 2020-02-27 RX ADMIN — OXYCODONE HYDROCHLORIDE 5 MILLIGRAM(S): 5 TABLET ORAL at 12:22

## 2020-02-27 RX ADMIN — HEPARIN SODIUM 5000 UNIT(S): 5000 INJECTION INTRAVENOUS; SUBCUTANEOUS at 06:11

## 2020-02-27 NOTE — DISCHARGE NOTE PROVIDER - HOSPITAL COURSE
patient is a resident at the [facility]--followed by her physician above--history corroborated with patient's niece above--patient with a history of essential HTN, CAD with past stent, PAD, reported gout, past undifferentiated dysphagia, unspecified catabolic state, last admitted to [place] in August 2018, known AAA, with patient with a presumed mechanical fall at the [facility], with the patient noted in patient's closet, attempting to ambulate but unable due to RIGHT hip pain.    found to have with R greater troch hip fx    seen by ortho-    had MRI right hip-     Patient would like to avoid surgery at this time. Given that GT Fx does not completely extend through intertrochanteric region to lesser trochanter, will trial patient OOB to chair and evaluate pain. Patient may be OOB to chair and TTWB RLE with PT. patient is a resident at the [facility]--followed by her physician above--history corroborated with patient's niece above--patient with a history of essential HTN, CAD with past stent, PAD, reported gout, past undifferentiated dysphagia, unspecified catabolic state, last admitted to [place] in August 2018, known AAA, with patient with a presumed mechanical fall at the [facility], with the patient noted in patient's closet, attempting to ambulate but unable due to RIGHT hip pain.    found to have with R greater troch hip fx    seen by ortho-    had MRI right hip-     Patient would like to avoid surgery at this time. Given that GT Fx does not completely extend through intertrochanteric region to lesser trochanter, will trial patient OOB to chair and evaluate pain. Patient may be OOB , NWB right LE; follow up with ortho on follow up; physical therapy;    seen by speech/swallow and had MBS and advised soft diet.    pt is seen by PT and advised rehab.

## 2020-02-27 NOTE — SWALLOW BEDSIDE ASSESSMENT ADULT - SLP PERTINENT HISTORY OF CURRENT PROBLEM
patient is a resident at the [facility]--followed by her physician above--history corroborated with patient's niece above--patient with a history of essential HTN, CAD with past stent, PAD, reported gout, past undifferentiated dysphagia, unspecified catabolic state, last admitted to [place] in August 2018, known AAA, with patient with a presumed mechanical fall at the [facility], with the patient noted in patient's closet, attempting to ambulate but unable due to RIGHT hip pain.

## 2020-02-27 NOTE — PROVIDER CONTACT NOTE (MEDICATION) - ACTION/TREATMENT ORDERED:
Clifford Mitchell NP. Patient educated on risks vs benefits. Patient still refusing. Will continue to monitor.

## 2020-02-27 NOTE — DIETITIAN INITIAL EVALUATION ADULT. - DIET TYPE
S/p SLP bedside swallow Dysphagia 3  DASH S/p SLP bedside swallow Dysphagia 3  DASH, further assessment tomorrow

## 2020-02-27 NOTE — SWALLOW BEDSIDE ASSESSMENT ADULT - SWALLOW EVAL: PATIENT/FAMILY GOALS STATEMENT
When asked about h/o difficulty swallowing, pt states she has "slime in her mouth" when eating and drinking.

## 2020-02-27 NOTE — SWALLOW BEDSIDE ASSESSMENT ADULT - SWALLOW EVAL: RECOMMENDED FEEDING/EATING TECHNIQUES
allow for swallow between intakes/maintain upright posture during/after eating for 30 mins/small sips/bites/position upright (90 degrees) maintain upright posture during/after eating for 30 mins/no straws/allow for swallow between intakes/position upright (90 degrees)/small sips/bites

## 2020-02-27 NOTE — SWALLOW BEDSIDE ASSESSMENT ADULT - PHARYNGEAL PHASE
Delayed pharyngeal swallow/audible swallows Delayed pharyngeal swallow audible swallows/Delayed pharyngeal swallow/Cough post oral intake

## 2020-02-27 NOTE — PHYSICAL THERAPY INITIAL EVALUATION ADULT - ADDITIONAL COMMENTS
DESCRIPTION OF PROCEDURE: Informed consent was obtained after a detailed explanation of the procedure including risks, benefits, and alternatives. Universal protocol was observed. While the patient was positioned supine on the fluoroscopy table, after localizing, marking and anesthetizing the skin overlying the right of midline low abdomen, a small incision was made. Under live sonography, a 18 gauge trocar needle was advanced into the complex abdominal wall collection. Static sonogram image was saved confirming appropriate needle tip placement. 3 cc of turbid 10 colored fluid was aspirated and sent for laboratory analysis. An 035 guidewire was inserted and under fluoroscopic guidance a 6 [place] all-purpose locking drain was placed over the wire. Final image confirmed satisfactory placement of the catheter tip with well-formed loop. Approximately a total of 8 cc of tan turbid fluid was aspirated while the patient was in IR. The catheter was sutured to the skin, connected to a MORGAN bulb and dressed appropriately. The patient tolerated the procedure well and left the department in stable condition. FINDINGS: 6 [place] all-purpose locking drain terminating just left of midline in the mid abdomen. Successful placement of a 6 [place] all-purpose locking drain in the midline abdominal wall collection. Total of 8 cc of 10 turbid fluid was drained. Sample was sent as requested by the ordering service. Consultations:    Consults:     Final Specialist Recommendations/Findings:   IP CONSULT TO GENERAL SURGERY  IP CONSULT TO INTERNAL MEDICINE  PHARMACY TO DOSE VANCOMYCIN      The patient was seen and examined on day of discharge and this discharge summary is in conjunction with any daily progress note from day of discharge. Discharge plan:     Disposition: Skilled Facility    Physician Follow Up:     DO Parish [address]   [address] [id number]  [phone]             Requiring Further
PTA pt was independent with functional mobility and ADLs with a RW. Pt lives in an assisted living facility

## 2020-02-27 NOTE — SWALLOW BEDSIDE ASSESSMENT ADULT - SWALLOW EVAL: DIAGNOSIS
Bedside swallow evaluation initiated, however, transport arrived to bring pt to echo testing. Case discussed with KAIA Jamison. Pt and RN both informed that this service is to follow in the afternoon. Pt admitted s/p mechanical fall and R hip pain, now with  R greater troch hip fx. Pt now presents with oropharyngeal dysphagia marked by prolonged mastication, delayed oral transit, delayed initiation of swallow trigger, audible swallows suggestive of discoordination of swallow, cough post swallow with thin liquids suggestive of laryngeal penetration/aspiration. Pt with cough and expectoration of phlegm throughout exam which pt and friend at bedside endorse persists both before and after PO intake. Pt admitted s/p mechanical fall and R hip pain, now with  R greater troch hip fx. Pt now presents with oropharyngeal dysphagia marked by prolonged mastication, delayed oral transit, delayed initiation of swallow trigger, audible swallows suggestive of discoordination of swallow, cough post swallow with thin liquids suggestive of laryngeal penetration/aspiration. Pt with cough and expectoration of phlegm throughout exam both prior to PO intake, and after all trials administered. Expectoration of phlegm increased following trials of thin liquids. Pt and friend at bedside endorse that this has occurred prior to admission. Pt admitted s/p mechanical fall and R hip pain, now with  R greater troch hip fx. Pt now presents with oropharyngeal dysphagia marked by prolonged mastication, delayed oral transit, delayed initiation of swallow trigger, audible swallows suggestive of discoordination of swallow, cough post swallow with thin liquids suggestive of laryngeal penetration/aspiration. Pt with cough and expectoration of phlegm throughout exam both prior to PO intake, and after all trials administered. Expectoration of phlegm increased following trials of thin liquids. Pt and friend at bedside endorse that this has occurred prior to admission. There is a behavioral component to the dysphagia marked by oral holding followed by expectoration with thin liquid trials. Pt admitted s/p mechanical fall and R hip pain, now with  R greater troch hip fx. Pt now presents with oropharyngeal dysphagia marked by prolonged mastication, delayed oral transit, delayed initiation of swallow trigger, audible swallows suggestive of discoordination of swallow, cough post swallow with thin liquids suggestive of laryngeal penetration/aspiration. Pt with expectoration of phlegm throughout exam both prior to PO intake, and after all trials administered. Expectoration of phlegm increased following trials of thin liquids. Pt and friend at bedside endorse that this has occurred prior to admission. There is a behavioral component to the dysphagia marked by oral holding followed by expectoration with thin liquid trials.

## 2020-02-27 NOTE — SWALLOW BEDSIDE ASSESSMENT ADULT - SLP GENERAL OBSERVATIONS
Pt found awake and alert in bed with friend at bedside. Pt eating breakfast upon initiation of evaluation. Pt endorses history of difficulty swallowing and reports coughing up phlegm both during and not during meals. Pt currently on O2 via nasal cannula. Upon initiation of evaluation, transport arrived to bring pt to echo testing. Case discussed with KAIA Jamison, this service to follow in the afternoon. Pt found awake and alert in bed with friend at bedside. Pt eating breakfast upon initiation of evaluation. Pt endorses history of difficulty swallowing and reports coughing up phlegm both during and not during meals. Pt currently on O2 via nasal cannula. Upon initiation of evaluation, transport arrived to bring pt to echo testing. Case discussed with KAIA Jamison, this service to follow in the afternoon. **Pt found in bed Pt found awake and alert in bed with friend at bedside. Pt eating breakfast upon initiation of evaluation. Pt endorses history of difficulty swallowing and reports coughing up phlegm both during and not during meals. Pt currently on O2 via nasal cannula. Upon initiation of evaluation, transport arrived to bring pt to echo testing. Case discussed with KAIA Jamison, this service to follow in the afternoon. **Pt found in bed returned from echo. Pt awake and alert, orientedx2 with intermittent confusion. Pt on 2L O2 via nasal cannula. Pt conversant and able to follow simple directions for the purpose of this exam. Pt noted with baseline cough and expectoration of phlegm. Pt and pt's friend, who was at bedside, endorse that pt with frequent expectoration of phlegm.

## 2020-02-27 NOTE — SWALLOW BEDSIDE ASSESSMENT ADULT - ORAL PREPARATORY PHASE
Reduced oral grading Within functional limits however, functional for soft solid textures/Decreased mastication ability Decreased mastication ability oral holding noted, expectoration x2

## 2020-02-27 NOTE — PROGRESS NOTE ADULT - SUBJECTIVE AND OBJECTIVE BOX
Pt S/E at bedside, no acute events overnight, pain controlled. No complaints this morning. Awaiting MRI to further evaluate R greater troch fx. Would like to avoid any surgical intervention if possible.    Vital Signs Last 24 Hrs  T(C): 36.2 (27 Feb 2020 06:01), Max: 36.9 (26 Feb 2020 16:00)  T(F): 97.2 (27 Feb 2020 06:01), Max: 98.5 (26 Feb 2020 16:00)  HR: 60 (27 Feb 2020 06:01) (60 - 84)  BP: 152/76 (27 Feb 2020 06:01) (123/64 - 159/76)  BP(mean): --  RR: 18 (27 Feb 2020 06:01) (16 - 18)  SpO2: 98% (27 Feb 2020 06:01) (93% - 99%)    Gen: NAD, AAOx3    Right Lower Extremity:  Skin intact  +ttp over greater troch  +EHL/FHL/TA/GS  SILT L3-S1  +DP/PT Pulses  Compartments soft  No calf TTP B/L

## 2020-02-27 NOTE — DISCHARGE NOTE PROVIDER - NSDCCPCAREPLAN_GEN_ALL_CORE_FT
PRINCIPAL DISCHARGE DIAGNOSIS  Diagnosis: Hip fracture  Assessment and Plan of Treatment: right hip fracture  seen by ortho  right lower extremity- toe touch weight bearing  PT eval- physical therapy  follow up with ortho in 2 weeks PRINCIPAL DISCHARGE DIAGNOSIS  Diagnosis: Hip fracture  Assessment and Plan of Treatment: right hip fracture  seen by ortho  right lower extremity- non weight bearing for now; follow up by ortho in 1 week  PT eval- physical therapy  follow up with ortho in 2 weeks      SECONDARY DISCHARGE DIAGNOSES  Diagnosis: Severe protein-calorie malnutrition  Assessment and Plan of Treatment: Severe protein-calorie malnutrition  encourage feeds  nutrition supplements    Diagnosis: AAA (abdominal aortic aneurysm) without rupture  Assessment and Plan of Treatment: AAA (abdominal aortic aneurysm) without rupture PRINCIPAL DISCHARGE DIAGNOSIS  Diagnosis: Hip fracture  Assessment and Plan of Treatment: right hip fracture  seen by ortho  right lower extremity- non weight bearing for now; follow up by ortho in 1 week  PT eval- physical therapy  follow up with ortho in 2 weeks      SECONDARY DISCHARGE DIAGNOSES  Diagnosis: Severe protein-calorie malnutrition  Assessment and Plan of Treatment: Severe protein-calorie malnutrition  encourage feeds  nutrition supplements    Diagnosis: AAA (abdominal aortic aneurysm) without rupture  Assessment and Plan of Treatment: AAA (abdominal aortic aneurysm) without rupture  Abdominal US showing stable AAA at 3.2 stable from prior imaging.  Periodic outpatient monitoring.

## 2020-02-27 NOTE — DIETITIAN INITIAL EVALUATION ADULT. - PROBLEM SELECTOR PLAN 6
Transitions of Care Status:  1.  Name of PCP:    Pravin Roth MD [phone]  2.  PCP Contacted on Admission: [ ] Y    [ x] N    3.  PCP contacted at Discharge: [ ] Y    [ ] N    [ ] N/A  4.  Post-Discharge Appointment Date and Location:  5.  Summary of Handoff given to PCP:

## 2020-02-27 NOTE — DIETITIAN INITIAL EVALUATION ADULT. - ADD RECOMMEND
add ensure 2x daily, assist with menus, tray preparation, monitor/encourage PO intake, assist with feeding as needed

## 2020-02-27 NOTE — DISCHARGE NOTE PROVIDER - CARE PROVIDER_API CALL
Lakhwinder Pereyra (MD)  Orthopaedic Surgery  [address] [place] [address], [address]  [place] [address]  Phone: [phone]  Fax: [phone]  Follow Up Time: 2 weeks

## 2020-02-27 NOTE — SWALLOW BEDSIDE ASSESSMENT ADULT - ASR SWALLOW ASPIRATION MONITOR
fever/pneumonia/throat clearing/upper respiratory infection/gurgly voice/change of breathing pattern/cough

## 2020-02-27 NOTE — DIETITIAN INITIAL EVALUATION ADULT. - FACTORS AFF FOOD INTAKE
difficulty swallowing/persistent lack of appetite persistent lack of appetite/difficulty swallowing/difficulty with food procurement/preparation

## 2020-02-27 NOTE — DIETITIAN INITIAL EVALUATION ADULT. - ENERGY NEEDS
HT 5'5  lbs (stated by family)  BMI=19.3 HT 5'5  lbs (stated by )  BMI=19.3  Discrepant weight;  bed scale likely wrong stated weight used however pt may actually be less

## 2020-02-27 NOTE — DISCHARGE NOTE PROVIDER - NSDCMRMEDTOKEN_GEN_ALL_CORE_FT
pantoprazole 40 mg oral delayed release tablet: 1 tab(s) orally once a day (before a meal)  valsartan 160 mg oral tablet: 1 tab(s) orally once a day acetaminophen 325 mg oral tablet: 2 tab(s) orally every 6 hours, As needed, Moderate Pain (4 - 6)  heparin: 5000 unit(s) subcutaneous every 8 hours  Multiple Vitamins oral tablet: 1 tab(s) orally once a day  oxyCODONE: 2.5 milligram(s) orally every 6 hours, As Needed  pantoprazole 40 mg oral delayed release tablet: 1 tab(s) orally once a day (before a meal)  senna oral tablet: 2 tab(s) orally once a day (at bedtime)  valsartan 160 mg oral tablet: 1 tab(s) orally once a day

## 2020-02-27 NOTE — DIETITIAN INITIAL EVALUATION ADULT. - REASON INDICATOR FOR ASSESSMENT
Nutrition support.   89yFemale c/o R hip pain s/p fall while at her assisted living facility. Per notes, Patient denies head hit or LOC. Patient denies numbness or tingling in the RLE.      Dx:  Right greater trochanter--> Fracture, plan for MRI to determine options

## 2020-02-27 NOTE — DIETITIAN INITIAL EVALUATION ADULT. - PHYSICAL APPEARANCE
NFPE with severe muscle wasting protruding clavicles. hollow temporal, calves, apparent ribs;; severe subcutaneous fat loss buccal region,  orbital pads, iliac crest, triceps/emaciated

## 2020-02-27 NOTE — SWALLOW BEDSIDE ASSESSMENT ADULT - ASR SWALLOW RECOMMEND DIAG
VFSS/MBS Modified Barium Swallow recommended pending MD clearance for OOB to IMELDA chair for completion of swallow study./VFSS/MBS Modified Barium Swallow recommended pending MD clearance for OOB to IMELDA chair for completion of swallow study. As per discussion with CONSTANTINO Gottlieb pt to be seen by PT this afternoon. This service to follow prior to MBS exam re: clearance for OOB to IMELDA chair./VFSS/MBS

## 2020-02-27 NOTE — PROGRESS NOTE ADULT - PROBLEM SELECTOR PLAN 6
Transitions of Care Status:  1.  Name of PCP:  Pravin Roth MD [phone]  2.  PCP Contacted on Admission: [ ] Y    [x] N    3.  PCP contacted at Discharge: [ ] Y    [ ] N    [ ] N/A  4.  Post-Discharge Appointment Date and Location:  5.  Summary of Handoff given to PCP:

## 2020-02-27 NOTE — PHYSICAL THERAPY INITIAL EVALUATION ADULT - PERTINENT HX OF CURRENT PROBLEM, REHAB EVAL
Pt is a 89 y/oF PMH of essential HTN, CAD with past stent, PAD, reported gout, past undifferentiated dysphagia, unspecified catabolic state, last admitted to [facility] in August 2018, known AAA, with pt with a presumed mechanical fall at the [facility] attempting to ambulate but unable due to RIGHT hip pain. s/p fall found to have  intertrochateric fx, MRI showing no extension to lesser trochanter, attempting to treat no-op, RLE TTWB, LLE WBAT.

## 2020-02-27 NOTE — SWALLOW BEDSIDE ASSESSMENT ADULT - COMMENTS
Ortho consulted on 2/26 for r hip pain. Right greater trochanter racture, plan for MRI to determine options. Pain control, IS, Regular diet for now, NWB pending MRI, DVT Ppx per medicine. On 2/26 per H&P, pt with weight loss and plan for nutrition and speech and swallow evaluation. Abdominal ultrasound planned 2/2 AAA. Echo planned secondary to CAD. 2/27, pt noted to refuse PO Carafate/Lasix/malox. Pt admitted for epipgastric pain, NPO after midnight for possible EGD. Per ortho on 2/27, awaiting MRI to further evaluate R greater troch fx.     Swallow History: Pt received beside swallow evaluation in August of 2018 recommending NPO and MBS, and then MBS in August of 2018 at [facility] recommending a Soft diet. 1) Pt to avoid mixed textures 1a)Full assist with meals, 2) Provide pills with puree, 3) Provide small single bites and sips at slow rate 4) Encourage successive swallows for oral and pharyngeal clearance 5) Alternate food and liquids to aid in oral and pharyngeal clearance 6) Aspiration precautions. Monitor for s/s aspiration/laryngeal penetration. Ortho consulted on 2/26 for r hip pain. Right greater trochanter racture, plan for MRI to determine options. Pain control, IS, Regular diet for now, NWB pending MRI, DVT Ppx per medicine. On 2/26 per H&P, pt with weight loss and plan for nutrition and speech and swallow evaluation. Abdominal ultrasound planned 2/2 AAA. Echo planned secondary to CAD. 2/27, pt noted to refuse PO Carafate/Lasix/malox. Pt admitted for epipgastric pain, NPO after midnight for possible EGD. Per ortho on 2/27, given that GT Fx does not completely extend through intertrochanteric region to lesser trochanter, will trial patient OOB to chair and evaluate pain.     Swallow History: Pt received beside swallow evaluation in August of 2018 recommending NPO and MBS, and then MBS in August of 2018 at [facility] recommending a Soft diet. 1) Pt to avoid mixed textures 1a)Full assist with meals, 2) Provide pills with puree, 3) Provide small single bites and sips at slow rate 4) Encourage successive swallows for oral and pharyngeal clearance 5) Alternate food and liquids to aid in oral and pharyngeal clearance 6) Aspiration precautions. Monitor for s/s aspiration/laryngeal penetration. Pt expectorated thin liquid bolus x2 throughout exam. When asked why, pt states that she is "frightened", however, unclear as to why. Friend at bedside reports pt with h/o choking event.

## 2020-02-27 NOTE — DIETITIAN INITIAL EVALUATION ADULT. - OTHER INFO
patient with discrepant weight history. 1/17/18 weight 130  lbs, noted weight unlikley accuracy possible bed scale error as patient it apparently malnourished. NFPE with apparent muscle depletion and fat loss. See NFPE below. Further swallow eval planned for  Friday.  Patien admitted from assisted living where she drinks ensure 2x daily. Per  patient as been eating less and less with constant gradual weight loss. Patient visited at dinner consumed <50% of meal.  patient describes having "no appetite for a very long time."Discussed avoidance of further weight lossand  to maintain muscle strength.

## 2020-02-27 NOTE — CHART NOTE - NSCHARTNOTEFT_GEN_A_CORE
MRI R Hip reviewed with Dr. Pereyra. Patient would like to avoid surgery at this time. Given that GT Fx does not completely extend through intertrochanteric region to lesser trochanter, will trial patient OOB to chair and evaluate pain. Patient may be OOB to chair and TTWB RLE with PT. Dr. Pereyra to re-assess patient this evening.    Karuna Velazquez M.D.  PGY-2 Orthopaedic Surgery

## 2020-02-27 NOTE — CHART NOTE - NSCHARTNOTEFT_GEN_A_CORE
Upon Nutritional Assessment by the Registered Dietitian your patient was determined to meet criteria / has evidence of the following diagnosis/diagnoses:          [ ]  Mild Protein Calorie Malnutrition        [ ]  Moderate Protein Calorie Malnutrition        [ X] Severe Protein Calorie Malnutrition        [ ] Unspecified Protein Calorie Malnutrition        [ ] Underweight / BMI <19        [ ] Morbid Obesity / BMI > 40      Findings as based on:  [X ] Comprehensive nutrition assessment poor Po intake < 50% of needds met > 1 month  [X ] Nutrition Focused Physical Exam severe muscle wasting and fat loss  [ ] Other:       Nutrition Plan/Recommendations:  add ensure x2 daily, assist with menus, tray prep, monitor and encourage intake      PROVIDER Section:     By signing this assessment you are acknowledging and agree with the diagnosis/diagnoses assigned by the Registered Dietitian    Comments: Upon Nutritional Assessment by the Registered Dietitian your patient was determined to meet criteria / has evidence of the following diagnosis/diagnoses:          [ ]  Mild Protein Calorie Malnutrition        [ ]  Moderate Protein Calorie Malnutrition        [ X] Severe Protein Calorie Malnutrition        [ ] Unspecified Protein Calorie Malnutrition        [ ] Underweight / BMI <19        [ ] Morbid Obesity / BMI > 40      Findings as based on:  [X ] Comprehensive nutrition assessment poor Po intake < 50% of needs met > 1 month  [X ] Nutrition Focused Physical Exam : severe muscle wasting and fat loss BMI=19  [ ] Other:       Nutrition Plan/Recommendations:  add ensure x2 daily, assist with menus, tray prep, monitor and encourage intake      PROVIDER Section:     By signing this assessment you are acknowledging and agree with the diagnosis/diagnoses assigned by the Registered Dietitian    Comments:

## 2020-02-27 NOTE — DISCHARGE NOTE PROVIDER - INSTRUCTIONS
low salt low cholesterol soft diet; enlive 2 cans daily  per speech/swallow- Recommended Consistencies	Soft diet with thin liquids, food cut up into small pieces, single sips only  · Recommended Feeding/Eating Techniques	allow for swallow between intakes; crush medication (when feasible); maintain upright posture during/after eating for 30 mins; no straws; position upright (90 degrees); provide rest periods between swallows; small sips/bites

## 2020-02-28 DIAGNOSIS — E43 UNSPECIFIED SEVERE PROTEIN-CALORIE MALNUTRITION: ICD-10-CM

## 2020-02-28 PROCEDURE — 99232 SBSQ HOSP IP/OBS MODERATE 35: CPT

## 2020-02-28 RX ORDER — OXYCODONE HYDROCHLORIDE 5 MG/1
2.5 TABLET ORAL EVERY 6 HOURS
Refills: 0 | Status: DISCONTINUED | OUTPATIENT
Start: 2020-02-28 | End: 2020-03-02

## 2020-02-28 RX ADMIN — SENNA PLUS 2 TABLET(S): 8.6 TABLET ORAL at 21:03

## 2020-02-28 RX ADMIN — Medication 1 TABLET(S): at 11:46

## 2020-02-28 RX ADMIN — PANTOPRAZOLE SODIUM 40 MILLIGRAM(S): 20 TABLET, DELAYED RELEASE ORAL at 05:48

## 2020-02-28 RX ADMIN — Medication 650 MILLIGRAM(S): at 22:30

## 2020-02-28 RX ADMIN — HEPARIN SODIUM 5000 UNIT(S): 5000 INJECTION INTRAVENOUS; SUBCUTANEOUS at 05:49

## 2020-02-28 RX ADMIN — Medication 650 MILLIGRAM(S): at 21:03

## 2020-02-28 RX ADMIN — LOSARTAN POTASSIUM 100 MILLIGRAM(S): 100 TABLET, FILM COATED ORAL at 05:48

## 2020-02-28 RX ADMIN — HEPARIN SODIUM 5000 UNIT(S): 5000 INJECTION INTRAVENOUS; SUBCUTANEOUS at 19:02

## 2020-02-28 NOTE — SWALLOW VFSS/MBS ASSESSMENT ADULT - DIAGNOSTIC IMPRESSIONS
Pt arrived to radiology in IMELDA chair on room air, notably anxious. When pt was seen for bedside swallow evaluation on 2/27, pt on supplemental O2 via NC at 2L. Pulse ox placed on pt in xray room, O2 sating at 85. Radiology nurse called to xray room, pt put on supplemental O2 at 2L via nasal cannula by radiology nurse. O2 sat then went back up to 95-98. However, when speaking, O2 sat went down to 88. Pt required consistent cueing from SLP team to continue to take a breath. KAIA Jamison and CONSTANTINO Gottlieb and notified and informed via phone/spectra link that exam not to be conducted at this time 2/2 pt inconsistent O2 sat level with supplemental O2. Pt put in transport, remained on 2L O2 via nasal cannula, and left the radiology unit in no distress. KAIA Jamison and CONSTANTINO Gottlieb notified.

## 2020-02-28 NOTE — PROGRESS NOTE ADULT - PROBLEM SELECTOR PLAN 6
Transitions of Care Status:  1.  Name of PCP:  Pravin Roth MD [phone]  2.  PCP Contacted on Admission: [ ] Y    [x] N    3.  PCP contacted at Discharge: [ ] Y    [ ] N    [ ] N/A  4.  Post-Discharge Appointment Date and Location:  5.  Summary of Handoff given to PCP: DVT ppx: hep subc  Dispo: [facility]

## 2020-02-28 NOTE — CHART NOTE - NSCHARTNOTEFT_GEN_A_CORE
Nutrition Chart Note.    Pt seen for initial malnutrition follow-up. Per chart: "89 year old female, [facility] resident with PMH of HTN, CAD, PAD, gout, dysphagia, AAA who presents s/p mechanical fall  found to have R hip fracture pending TIFFANIE placement." Seen by SLP today (2/28), however MBS unable to be completed "2/2 pt inconsistent O2 sat level with supplemental O2."    Source: Patient [x ]    Family [ ]     other [ x] EMR    Diet : Dysphagia 3 Soft-Nectar Consistency Fluid, DASH/TLC, No Nuts, Ensure Enlive x2            Pt notes she is tolerating her current diet well and her appetite/PO intake has slightly improved since last RD visit yesterday (2/27); per flowsheets, consuming 50% at meals. She is now receiving Ensure Enlive x2 daily and was jail done with shake upon current visit.     Denies N+V, diarrhea/constipation - Pt noted with fecal incontinence, on bowel regimen - last BM documented 2/24.      Current Weight:   Weight (kg): 59 (02-26 @ 10:07)  % Weight Change    Pertinent Medications: MEDICATIONS  (STANDING):  heparin  Injectable 5000 Unit(s) SubCutaneous every 12 hours  losartan 100 milliGRAM(s) Oral daily  multivitamin 1 Tablet(s) Oral daily  pantoprazole    Tablet 40 milliGRAM(s) Oral before breakfast  senna 2 Tablet(s) Oral at bedtime    MEDICATIONS  (PRN):  acetaminophen   Tablet .. 650 milliGRAM(s) Oral every 6 hours PRN Moderate Pain (4 - 6)  oxyCODONE    IR 2.5 milliGRAM(s) Oral every 6 hours PRN Severe Pain (7 - 10)    Pertinent Labs:  02-27 Na143 mmol/L Glu 95 mg/dL K+ 3.9 mmol/L Cr  0.63 mg/dL BUN 12 mg/dL 02-26 Alb 4.2 g/dL      Skin per nursing flowsheets: IAD - perineum; no pressure injuries noted  Edema: 1+ R leg    Estimated Needs:   [ x] no change since previous assessment  [ ] recalculated:       Previous Nutrition Diagnosis: Malnutrition - Severe, chronic  Nutrition Diagnosis is [ x] ongoing - addressed with nutrition and micronutrient supplementation - Nutrition care plan achieved.         New Nutrition Diagnosis: [x ] not applicable        Interventions:   1. Continue diet as ordered with textures/consistencies deferred to SLP recommendations/team  2. Continue Ensure Enlive x2 daily  3. Continue micronutrient supplementation as ordered.  4. Provide assistance with meals as needed; encourage PO intake at meals/snacks, small frequent feedings, and nutrition supplement consumption between meals.  5. Monitor PO intake/tolerance, weights, labs, hydration status, bowels, and skin integrity.     RD remains available   Freida Baptiste MS, RD, CDN  pager #[phone]

## 2020-02-28 NOTE — PROGRESS NOTE ADULT - PROBLEM SELECTOR PLAN 4
Nutrition evaluation. S/S evaluation. Abdominal US showing stable AAA at 3.2 stable from prior imaging.

## 2020-02-28 NOTE — PROGRESS NOTE ADULT - PROBLEM SELECTOR PLAN 3
Abdominal US showing stable AAA at 3.2 stable from prior imaging. appreciate nutrition eval.  - add ensure 2x daily  - assist with menus, tray prepr and encourage intake

## 2020-02-28 NOTE — PROGRESS NOTE ADULT - SUBJECTIVE AND OBJECTIVE BOX
[facility] Division of Hospital Medicine  Trisha Duran MD  Pager (M-F, 8A-5P): [phone]  Other Times:  [phone]      Patient is a 89y old  Female who presents with a chief complaint of S/P fall at the [facility] with RIGHT hip pain (2020 15:37)      SUBJECTIVE / OVERNIGHT EVENTS: no acute events overnight. patient states pain has been controlled on tylenol. went down for MBS this AM and desatting, placed on O2 and returned to floor. patient feels well however, no sob, no complaints.    ADDITIONAL REVIEW OF SYSTEMS:    MEDICATIONS  (STANDING):  heparin  Injectable 5000 Unit(s) SubCutaneous every 12 hours  losartan 100 milliGRAM(s) Oral daily  multivitamin 1 Tablet(s) Oral daily  pantoprazole    Tablet 40 milliGRAM(s) Oral before breakfast  senna 2 Tablet(s) Oral at bedtime    MEDICATIONS  (PRN):  acetaminophen   Tablet .. 650 milliGRAM(s) Oral every 6 hours PRN Moderate Pain (4 - 6)  oxyCODONE    IR 2.5 milliGRAM(s) Oral every 6 hours PRN Severe Pain (7 - 10)      CAPILLARY BLOOD GLUCOSE        I&O's Summary    2020 07:  -  2020 07:00  --------------------------------------------------------  IN: 840 mL / OUT: 1000 mL / NET: -160 mL    2020 07:01  -  2020 14:25  --------------------------------------------------------  IN: 360 mL / OUT: 0 mL / NET: 360 mL        PHYSICAL EXAM:  Vital Signs Last 24 Hrs  T(C): 36.8 (2020 13:45), Max: 36.8 (2020 13:45)  T(F): 98.2 (2020 13:45), Max: 98.2 (2020 13:45)  HR: 73 (2020 13:45) (62 - 73)  BP: 139/78 (2020 13:45) (127/72 - 170/68)  BP(mean): --  RR: 18 (2020 13:45) (18 - 20)  SpO2: 95% (2020 13:45) (93% - 96%)    CONSTITUTIONAL: NAD, frail appearing  EYES: PERRLA; conjunctiva and sclera clear  ENMT: Moist oral mucosa, no pharyngeal injection or exudates; normal dentition  NECK: Supple, no palpable venu  RESPIRATORY: Normal respiratory effort; lungs are clear to auscultation bilaterally  CARDIOVASCULAR: Regular rate and rhythm, normal S1 and S2, no murmur/rub/gallop; No lower extremity edema; Peripheral pulses are 2+ bilaterally  ABDOMEN: Soft, Nondistended,  Nontender to palpation, normoactive bowel sounds  MUSCULOSKELETAL:  no swelling, +tenderness to palpation of R hip and low right back  PSYCH: A+O to person, place, and time; affect appropriate  NEUROLOGY: CN 2-12 are intact and symmetric; no gross sensory deficits   SKIN: venous stasis changes of lower extremities b/l, no hematoma nor ecchymoses    LABS:                        12.5   7.83  )-----------( 201      ( 2020 07:14 )             40.5     02-27    143  |  105  |  12  ----------------------------<  95  3.9   |  27  |  0.63    Ca    9.0      2020 07:13            Urinalysis Basic - ( 2020 10:12 )    Color: Yellow / Appearance: Clear / S.031 / pH: x  Gluc: x / Ketone: Negative  / Bili: Negative / Urobili: <2 mg/dL   Blood: x / Protein: Trace / Nitrite: Negative   Leuk Esterase: Negative / RBC: x / WBC x   Sq Epi: x / Non Sq Epi: x / Bacteria: x          RADIOLOGY & ADDITIONAL TESTS:  Results Reviewed: no leukocytosis, Cr stable.  Imaging Personally Reviewed:  TTE:  Conclusions:  1. Increased relative wall thickness with normal leftventricular mass index, consistent with concentric leftventricular remodeling.  2. Normal left ventricular systolic function.  3. Mild diastolic dysfunction (Stage I).  4. Estimated pulmonary artery systolic pressure equals 49mm Hg, assuming right atrial pressure equals 8 mm Hg,consistent with mild pulmonary pressures.    Electrocardiogram Personally Reviewed:    COORDINATION OF CARE:  Care Discussed with Consultants/Other Providers [Y]: medicine CONSTANTINO Gottlieb  Prior or Outpatient Records Reviewed [Y/N]:

## 2020-02-29 LAB
ANION GAP SERPL CALC-SCNC: 14 MMOL/L — SIGNIFICANT CHANGE UP (ref 5–17)
BASOPHILS # BLD AUTO: 0.05 K/UL — SIGNIFICANT CHANGE UP (ref 0–0.2)
BASOPHILS NFR BLD AUTO: 0.6 % — SIGNIFICANT CHANGE UP (ref 0–2)
BUN SERPL-MCNC: 24 MG/DL — HIGH (ref 7–23)
CALCIUM SERPL-MCNC: 9.4 MG/DL — SIGNIFICANT CHANGE UP (ref 8.4–10.5)
CHLORIDE SERPL-SCNC: 103 MMOL/L — SIGNIFICANT CHANGE UP (ref 96–108)
CO2 SERPL-SCNC: 25 MMOL/L — SIGNIFICANT CHANGE UP (ref 22–31)
CREAT SERPL-MCNC: 0.64 MG/DL — SIGNIFICANT CHANGE UP (ref 0.5–1.3)
EOSINOPHIL # BLD AUTO: 0.85 K/UL — HIGH (ref 0–0.5)
EOSINOPHIL NFR BLD AUTO: 10.5 % — HIGH (ref 0–6)
GLUCOSE SERPL-MCNC: 104 MG/DL — HIGH (ref 70–99)
HCT VFR BLD CALC: 40.3 % — SIGNIFICANT CHANGE UP (ref 34.5–45)
HGB BLD-MCNC: 12.5 G/DL — SIGNIFICANT CHANGE UP (ref 11.5–15.5)
IMM GRANULOCYTES NFR BLD AUTO: 0.2 % — SIGNIFICANT CHANGE UP (ref 0–1.5)
LYMPHOCYTES # BLD AUTO: 2.12 K/UL — SIGNIFICANT CHANGE UP (ref 1–3.3)
LYMPHOCYTES # BLD AUTO: 26.3 % — SIGNIFICANT CHANGE UP (ref 13–44)
MCHC RBC-ENTMCNC: 30.2 PG — SIGNIFICANT CHANGE UP (ref 27–34)
MCHC RBC-ENTMCNC: 31 GM/DL — LOW (ref 32–36)
MCV RBC AUTO: 97.3 FL — SIGNIFICANT CHANGE UP (ref 80–100)
MONOCYTES # BLD AUTO: 0.65 K/UL — SIGNIFICANT CHANGE UP (ref 0–0.9)
MONOCYTES NFR BLD AUTO: 8.1 % — SIGNIFICANT CHANGE UP (ref 2–14)
NEUTROPHILS # BLD AUTO: 4.38 K/UL — SIGNIFICANT CHANGE UP (ref 1.8–7.4)
NEUTROPHILS NFR BLD AUTO: 54.3 % — SIGNIFICANT CHANGE UP (ref 43–77)
NRBC # BLD: 0 /100 WBCS — SIGNIFICANT CHANGE UP (ref 0–0)
PLATELET # BLD AUTO: 225 K/UL — SIGNIFICANT CHANGE UP (ref 150–400)
POTASSIUM SERPL-MCNC: 3.8 MMOL/L — SIGNIFICANT CHANGE UP (ref 3.5–5.3)
POTASSIUM SERPL-SCNC: 3.8 MMOL/L — SIGNIFICANT CHANGE UP (ref 3.5–5.3)
RBC # BLD: 4.14 M/UL — SIGNIFICANT CHANGE UP (ref 3.8–5.2)
RBC # FLD: 14.9 % — HIGH (ref 10.3–14.5)
SODIUM SERPL-SCNC: 142 MMOL/L — SIGNIFICANT CHANGE UP (ref 135–145)
WBC # BLD: 8.07 K/UL — SIGNIFICANT CHANGE UP (ref 3.8–10.5)
WBC # FLD AUTO: 8.07 K/UL — SIGNIFICANT CHANGE UP (ref 3.8–10.5)

## 2020-02-29 PROCEDURE — 99232 SBSQ HOSP IP/OBS MODERATE 35: CPT

## 2020-02-29 RX ADMIN — PANTOPRAZOLE SODIUM 40 MILLIGRAM(S): 20 TABLET, DELAYED RELEASE ORAL at 06:37

## 2020-02-29 RX ADMIN — Medication 1 TABLET(S): at 11:43

## 2020-02-29 RX ADMIN — OXYCODONE HYDROCHLORIDE 2.5 MILLIGRAM(S): 5 TABLET ORAL at 14:13

## 2020-02-29 RX ADMIN — HEPARIN SODIUM 5000 UNIT(S): 5000 INJECTION INTRAVENOUS; SUBCUTANEOUS at 17:38

## 2020-02-29 RX ADMIN — OXYCODONE HYDROCHLORIDE 2.5 MILLIGRAM(S): 5 TABLET ORAL at 14:43

## 2020-02-29 RX ADMIN — Medication 650 MILLIGRAM(S): at 11:56

## 2020-02-29 RX ADMIN — LOSARTAN POTASSIUM 100 MILLIGRAM(S): 100 TABLET, FILM COATED ORAL at 06:37

## 2020-02-29 RX ADMIN — Medication 650 MILLIGRAM(S): at 11:43

## 2020-02-29 RX ADMIN — HEPARIN SODIUM 5000 UNIT(S): 5000 INJECTION INTRAVENOUS; SUBCUTANEOUS at 06:37

## 2020-02-29 NOTE — PROGRESS NOTE ADULT - PROBLEM SELECTOR PLAN 3
appreciate nutrition eval.  - add ensure 2x daily  - assist with menus, tray prepr and encourage intake

## 2020-02-29 NOTE — PROGRESS NOTE ADULT - SUBJECTIVE AND OBJECTIVE BOX
Patient is a 89y old  Female who presents with a chief complaint of S/P fall at the [facility] with RIGHT hip pain (28 Feb 2020 14:25)      SUBJECTIVE / OVERNIGHT EVENTS:    patient seen and examined.  feels well,no complaints. asking me not to examine her       ROS:  14 point ROS negative in detail except stated as above    MEDICATIONS  (STANDING):  heparin  Injectable 5000 Unit(s) SubCutaneous every 12 hours  losartan 100 milliGRAM(s) Oral daily  multivitamin 1 Tablet(s) Oral daily  pantoprazole    Tablet 40 milliGRAM(s) Oral before breakfast  senna 2 Tablet(s) Oral at bedtime    MEDICATIONS  (PRN):  acetaminophen   Tablet .. 650 milliGRAM(s) Oral every 6 hours PRN Moderate Pain (4 - 6)  oxyCODONE    IR 2.5 milliGRAM(s) Oral every 6 hours PRN Severe Pain (7 - 10)      CAPILLARY BLOOD GLUCOSE        I&O's Summary    28 Feb 2020 07:01  -  29 Feb 2020 07:00  --------------------------------------------------------  IN: 1140 mL / OUT: 500 mL / NET: 640 mL    29 Feb 2020 07:01  -  29 Feb 2020 12:22  --------------------------------------------------------  IN: 240 mL / OUT: 0 mL / NET: 240 mL        PHYSICAL EXAM:  Vital Signs Last 24 Hrs  T(C): 36.3 (29 Feb 2020 10:59), Max: 36.9 (28 Feb 2020 20:06)  T(F): 97.3 (29 Feb 2020 10:59), Max: 98.4 (28 Feb 2020 20:06)  HR: 61 (29 Feb 2020 10:59) (60 - 73)  BP: 125/74 (29 Feb 2020 10:59) (120/70 - 150/73)  BP(mean): --  RR: 22 (29 Feb 2020 10:59) (18 - 22)  SpO2: 93% (29 Feb 2020 10:59) (93% - 98%)      unable to examine     LABS:                        12.5   8.07  )-----------( 225      ( 29 Feb 2020 07:17 )             40.3     02-29    142  |  103  |  24<H>  ----------------------------<  104<H>  3.8   |  25  |  0.64    Ca    9.4      29 Feb 2020 07:20                RADIOLOGY & ADDITIONAL TESTS:    Imaging Personally Reviewed:    Consultant(s) Notes Reviewed:      Care Discussed with Consultants/Other Providers:  CONSTANTINO Aceves

## 2020-03-01 DIAGNOSIS — S72.001G FRACTURE OF UNSPECIFIED PART OF NECK OF RIGHT FEMUR, SUBSEQUENT ENCOUNTER FOR CLOSED FRACTURE WITH DELAYED HEALING: ICD-10-CM

## 2020-03-01 PROCEDURE — 99232 SBSQ HOSP IP/OBS MODERATE 35: CPT

## 2020-03-01 RX ADMIN — HEPARIN SODIUM 5000 UNIT(S): 5000 INJECTION INTRAVENOUS; SUBCUTANEOUS at 17:56

## 2020-03-01 RX ADMIN — OXYCODONE HYDROCHLORIDE 2.5 MILLIGRAM(S): 5 TABLET ORAL at 06:00

## 2020-03-01 RX ADMIN — SENNA PLUS 2 TABLET(S): 8.6 TABLET ORAL at 21:17

## 2020-03-01 RX ADMIN — Medication 1 TABLET(S): at 11:43

## 2020-03-01 RX ADMIN — LOSARTAN POTASSIUM 100 MILLIGRAM(S): 100 TABLET, FILM COATED ORAL at 05:26

## 2020-03-01 RX ADMIN — HEPARIN SODIUM 5000 UNIT(S): 5000 INJECTION INTRAVENOUS; SUBCUTANEOUS at 05:26

## 2020-03-01 RX ADMIN — OXYCODONE HYDROCHLORIDE 2.5 MILLIGRAM(S): 5 TABLET ORAL at 05:26

## 2020-03-01 RX ADMIN — PANTOPRAZOLE SODIUM 40 MILLIGRAM(S): 20 TABLET, DELAYED RELEASE ORAL at 05:27

## 2020-03-01 NOTE — PROGRESS NOTE ADULT - PROBLEM SELECTOR PROBLEM 1
Closed fracture of right hip, initial encounter Closed fracture of right hip with delayed healing, subsequent encounter

## 2020-03-01 NOTE — PROGRESS NOTE ADULT - ATTENDING COMMENTS
Dr. Trisha Duran  Division of Hospital Medicine  [facility]   Pager: [phone]
Dr. Trisha Duran  Division of Hospital Medicine  [facility]   Pager: [phone]
Jessa Winter   Hospitalist

## 2020-03-01 NOTE — PROGRESS NOTE ADULT - PROBLEM SELECTOR PLAN 4
Abdominal US showing stable AAA at 3.2 stable from prior imaging. Abdominal US showing stable AAA at 3.2 stable from prior imaging.  Periodic outpatient monitoring

## 2020-03-01 NOTE — PROGRESS NOTE ADULT - SUBJECTIVE AND OBJECTIVE BOX
Patient is a 89y old  Female who presents with a chief complaint of S/P fall at the [facility] with RIGHT hip pain (29 Feb 2020 12:22)      SUBJECTIVE / OVERNIGHT EVENTS:    Patient is seen with fever.        ADDITIONAL REVIEW OF SYSTEMS: Unable to reliably obtain     MEDICATIONS  (STANDING):  heparin  Injectable 5000 Unit(s) SubCutaneous every 12 hours  losartan 100 milliGRAM(s) Oral daily  multivitamin 1 Tablet(s) Oral daily  pantoprazole    Tablet 40 milliGRAM(s) Oral before breakfast  senna 2 Tablet(s) Oral at bedtime    MEDICATIONS  (PRN):  acetaminophen   Tablet .. 650 milliGRAM(s) Oral every 6 hours PRN Moderate Pain (4 - 6)  oxyCODONE    IR 2.5 milliGRAM(s) Oral every 6 hours PRN Severe Pain (7 - 10)      CAPILLARY BLOOD GLUCOSE        I&O's Summary    29 Feb 2020 07:01  -  01 Mar 2020 07:00  --------------------------------------------------------  IN: 680 mL / OUT: 500 mL / NET: 180 mL    01 Mar 2020 07:01  -  01 Mar 2020 12:00  --------------------------------------------------------  IN: 240 mL / OUT: 0 mL / NET: 240 mL        PHYSICAL EXAM:  Vital Signs Last 24 Hrs  T(C): 36.7 (01 Mar 2020 11:38), Max: 36.8 (01 Mar 2020 00:27)  T(F): 98 (01 Mar 2020 11:38), Max: 98.2 (01 Mar 2020 00:27)  HR: 68 (01 Mar 2020 11:38) (61 - 90)  BP: 138/78 (01 Mar 2020 11:38) (131/61 - 140/72)  BP(mean): --  RR: 20 (01 Mar 2020 11:38) (18 - 20)  SpO2: 95% (01 Mar 2020 11:38) (95% - 97%)    PHYSICAL EXAM:  GENERAL: NAD, well-developed  HEAD:  Atraumatic, Normocephalic  EYES:  conjunctiva and sclera clear  NECK: Supple, No JVD  CHEST/LUNG: Clear to auscultation bilaterally; No wheeze  HEART: Regular rate and rhythm; No murmurs, rubs, or gallops  ABDOMEN: Soft, Nontender, Nondistended; Bowel sounds present  EXTREMITIES:  2+ Peripheral Pulses, No clubbing, cyanosis, or edema  PSYCH: A awake       LABS:                        12.5   8.07  )-----------( 225      ( 29 Feb 2020 07:17 )             40.3     02-29    142  |  103  |  24<H>  ----------------------------<  104<H>  3.8   |  25  |  0.64    Ca    9.4      29 Feb 2020 07:20                  RADIOLOGY & ADDITIONAL TESTS:    Imaging Personally Reviewed:    Electrocardiogram Personally Reviewed:    COORDINATION OF CARE:  Care Discussed with Consultants/Other Providers [Y/N]:  Prior or Outpatient Records Reviewed [Y/N]: Patient is a 89y old  Female who presents with a chief complaint of S/P fall at the [facility] with RIGHT hip pain (29 Feb 2020 12:22)      SUBJECTIVE / OVERNIGHT EVENTS:    Patient is seen with  NO fever, no diarrhea , no pain.        ADDITIONAL REVIEW OF SYSTEMS: Unable to reliably obtain     MEDICATIONS  (STANDING):  heparin  Injectable 5000 Unit(s) SubCutaneous every 12 hours  losartan 100 milliGRAM(s) Oral daily  multivitamin 1 Tablet(s) Oral daily  pantoprazole    Tablet 40 milliGRAM(s) Oral before breakfast  senna 2 Tablet(s) Oral at bedtime    MEDICATIONS  (PRN):  acetaminophen   Tablet .. 650 milliGRAM(s) Oral every 6 hours PRN Moderate Pain (4 - 6)  oxyCODONE    IR 2.5 milliGRAM(s) Oral every 6 hours PRN Severe Pain (7 - 10)      CAPILLARY BLOOD GLUCOSE        I&O's Summary    29 Feb 2020 07:01  -  01 Mar 2020 07:00  --------------------------------------------------------  IN: 680 mL / OUT: 500 mL / NET: 180 mL    01 Mar 2020 07:01  -  01 Mar 2020 12:00  --------------------------------------------------------  IN: 240 mL / OUT: 0 mL / NET: 240 mL        PHYSICAL EXAM:  Vital Signs Last 24 Hrs  T(C): 36.7 (01 Mar 2020 11:38), Max: 36.8 (01 Mar 2020 00:27)  T(F): 98 (01 Mar 2020 11:38), Max: 98.2 (01 Mar 2020 00:27)  HR: 68 (01 Mar 2020 11:38) (61 - 90)  BP: 138/78 (01 Mar 2020 11:38) (131/61 - 140/72)  BP(mean): --  RR: 20 (01 Mar 2020 11:38) (18 - 20)  SpO2: 95% (01 Mar 2020 11:38) (95% - 97%)    PHYSICAL EXAM:  GENERAL: NAD, well-developed  HEAD:  Atraumatic, Normocephalic  EYES:  conjunctiva and sclera clear  NECK: Supple, No JVD  CHEST/LUNG: Clear to auscultation bilaterally; No wheeze  HEART: Regular rate and rhythm; No murmurs, rubs, or gallops  ABDOMEN: Soft, Nontender, Nondistended; Bowel sounds present  EXTREMITIES:  2+ Peripheral Pulses, No clubbing, cyanosis, or edema  PSYCH: Alert and  awake       LABS:                        12.5   8.07  )-----------( 225      ( 29 Feb 2020 07:17 )             40.3     02-29    142  |  103  |  24<H>  ----------------------------<  104<H>  3.8   |  25  |  0.64    Ca    9.4      29 Feb 2020 07:20                  RADIOLOGY & ADDITIONAL TESTS:    Imaging Personally Reviewed:    Electrocardiogram Personally Reviewed:    COORDINATION OF CARE:  Care Discussed with Consultants/Other Providers [Y/N]:  Prior or Outpatient Records Reviewed [Y/N]:

## 2020-03-02 ENCOUNTER — TRANSCRIPTION ENCOUNTER (OUTPATIENT)
Age: 85
End: 2020-03-02

## 2020-03-02 VITALS
HEART RATE: 68 BPM | DIASTOLIC BLOOD PRESSURE: 65 MMHG | RESPIRATION RATE: 18 BRPM | OXYGEN SATURATION: 99 % | TEMPERATURE: 97 F | SYSTOLIC BLOOD PRESSURE: 106 MMHG

## 2020-03-02 PROCEDURE — 76775 US EXAM ABDO BACK WALL LIM: CPT

## 2020-03-02 PROCEDURE — 81003 URINALYSIS AUTO W/O SCOPE: CPT

## 2020-03-02 PROCEDURE — 93005 ELECTROCARDIOGRAM TRACING: CPT

## 2020-03-02 PROCEDURE — 84132 ASSAY OF SERUM POTASSIUM: CPT

## 2020-03-02 PROCEDURE — 84484 ASSAY OF TROPONIN QUANT: CPT

## 2020-03-02 PROCEDURE — 85014 HEMATOCRIT: CPT

## 2020-03-02 PROCEDURE — 73721 MRI JNT OF LWR EXTRE W/O DYE: CPT

## 2020-03-02 PROCEDURE — 97162 PT EVAL MOD COMPLEX 30 MIN: CPT

## 2020-03-02 PROCEDURE — 86850 RBC ANTIBODY SCREEN: CPT

## 2020-03-02 PROCEDURE — 92526 ORAL FUNCTION THERAPY: CPT

## 2020-03-02 PROCEDURE — 86900 BLOOD TYPING SEROLOGIC ABO: CPT

## 2020-03-02 PROCEDURE — 82803 BLOOD GASES ANY COMBINATION: CPT

## 2020-03-02 PROCEDURE — 99285 EMERGENCY DEPT VISIT HI MDM: CPT

## 2020-03-02 PROCEDURE — 93306 TTE W/DOPPLER COMPLETE: CPT

## 2020-03-02 PROCEDURE — 82435 ASSAY OF BLOOD CHLORIDE: CPT

## 2020-03-02 PROCEDURE — 82550 ASSAY OF CK (CPK): CPT

## 2020-03-02 PROCEDURE — 92610 EVALUATE SWALLOWING FUNCTION: CPT

## 2020-03-02 PROCEDURE — 74230 X-RAY XM SWLNG FUNCJ C+: CPT | Mod: 26

## 2020-03-02 PROCEDURE — 72125 CT NECK SPINE W/O DYE: CPT

## 2020-03-02 PROCEDURE — 82330 ASSAY OF CALCIUM: CPT

## 2020-03-02 PROCEDURE — 70450 CT HEAD/BRAIN W/O DYE: CPT

## 2020-03-02 PROCEDURE — 86901 BLOOD TYPING SEROLOGIC RH(D): CPT

## 2020-03-02 PROCEDURE — 82947 ASSAY GLUCOSE BLOOD QUANT: CPT

## 2020-03-02 PROCEDURE — 72131 CT LUMBAR SPINE W/O DYE: CPT

## 2020-03-02 PROCEDURE — 80053 COMPREHEN METABOLIC PANEL: CPT

## 2020-03-02 PROCEDURE — 73521 X-RAY EXAM HIPS BI 2 VIEWS: CPT

## 2020-03-02 PROCEDURE — 85610 PROTHROMBIN TIME: CPT

## 2020-03-02 PROCEDURE — 71045 X-RAY EXAM CHEST 1 VIEW: CPT

## 2020-03-02 PROCEDURE — 85027 COMPLETE CBC AUTOMATED: CPT

## 2020-03-02 PROCEDURE — 72170 X-RAY EXAM OF PELVIS: CPT

## 2020-03-02 PROCEDURE — 83605 ASSAY OF LACTIC ACID: CPT

## 2020-03-02 PROCEDURE — 80048 BASIC METABOLIC PNL TOTAL CA: CPT

## 2020-03-02 PROCEDURE — 99239 HOSP IP/OBS DSCHRG MGMT >30: CPT

## 2020-03-02 PROCEDURE — 92611 MOTION FLUOROSCOPY/SWALLOW: CPT

## 2020-03-02 PROCEDURE — 85730 THROMBOPLASTIN TIME PARTIAL: CPT

## 2020-03-02 PROCEDURE — 73552 X-RAY EXAM OF FEMUR 2/>: CPT

## 2020-03-02 PROCEDURE — 97530 THERAPEUTIC ACTIVITIES: CPT

## 2020-03-02 PROCEDURE — 97116 GAIT TRAINING THERAPY: CPT

## 2020-03-02 PROCEDURE — 84295 ASSAY OF SERUM SODIUM: CPT

## 2020-03-02 PROCEDURE — 74230 X-RAY XM SWLNG FUNCJ C+: CPT

## 2020-03-02 RX ORDER — HEPARIN SODIUM 5000 [USP'U]/ML
5000 INJECTION INTRAVENOUS; SUBCUTANEOUS
Qty: 0 | Refills: 0 | DISCHARGE
Start: 2020-03-02

## 2020-03-02 RX ORDER — SENNA PLUS 8.6 MG/1
2 TABLET ORAL
Qty: 0 | Refills: 0 | DISCHARGE
Start: 2020-03-02

## 2020-03-02 RX ORDER — ACETAMINOPHEN 500 MG
2 TABLET ORAL
Qty: 0 | Refills: 0 | DISCHARGE
Start: 2020-03-02

## 2020-03-02 RX ORDER — OXYCODONE HYDROCHLORIDE 5 MG/1
2.5 TABLET ORAL
Qty: 0 | Refills: 0 | DISCHARGE
Start: 2020-03-02

## 2020-03-02 RX ADMIN — Medication 1 TABLET(S): at 12:21

## 2020-03-02 RX ADMIN — PANTOPRAZOLE SODIUM 40 MILLIGRAM(S): 20 TABLET, DELAYED RELEASE ORAL at 06:25

## 2020-03-02 RX ADMIN — LOSARTAN POTASSIUM 100 MILLIGRAM(S): 100 TABLET, FILM COATED ORAL at 06:25

## 2020-03-02 RX ADMIN — HEPARIN SODIUM 5000 UNIT(S): 5000 INJECTION INTRAVENOUS; SUBCUTANEOUS at 06:25

## 2020-03-02 NOTE — CHART NOTE - NSCHARTNOTEFT_GEN_A_CORE
follow up- pt is cleared by attending MD for discharge to rehab; discussed discharge medication/follow up.  Chery Ornelas(NP)  [facility], [phone]

## 2020-03-02 NOTE — PROGRESS NOTE ADULT - NSHPATTENDINGPLANDISCUSS_GEN_ALL_CORE
patient and medicine CONSTANTINO Gottlieb
patient, friend bedside Shana Burgos, and medicine NP Chery
Medicine CONSTANTINO Gottlieb
CONSTANTINO Wing

## 2020-03-02 NOTE — SWALLOW VFSS/MBS ASSESSMENT ADULT - SLP PERTINENT HISTORY OF CURRENT PROBLEM
patient is a resident at the [facility]--followed by her physician above--history corroborated with patient's niece above--patient with a history of essential HTN, CAD with past stent, PAD, reported gout, past undifferentiated dysphagia, unspecified catabolic state, last admitted to [place] in August 2018, known AAA, with patient with a presumed mechanical fall at the [facility], with the patient noted in patient's closet, attempting to ambulate but unable due to RIGHT hip pain.

## 2020-03-02 NOTE — PROGRESS NOTE ADULT - PROBLEM SELECTOR PLAN 5
Seen by Speech/swallow ; s/p MBS this AM ; reccs soft diet with thin liquids, food cut is small pieces, small sips only.  Nutrition reccs noted

## 2020-03-02 NOTE — PROGRESS NOTE ADULT - PROBLEM SELECTOR PLAN 1
patient would like to avoid surgery if possible.  - MRI of R hip showing R greater trochanteric fx with intertrochanteric extension with no extension through lesser trochanter.  - appreciate ortho evaluation; plan to trial OOB to chair with TTWB RLE - patient tolerated  - pain control  - PT recs: TIFFANIE
patient would like to avoid surgery if possible.  - MRI of R hip showing R greater trochanteric fx with intertrochanteric extension with no extension through lesser trochanter.  - appreciate ortho evaluation; plan to trial OOB to chair with TTWB RLE and evaluate pain  - pain control  - PT recs: TIFFANIE
- MRI of R hip showing R greater trochanteric fx with intertrochanteric extension with no extension through lesser trochanter.  - No sx intervention per Ortho   - PT reccs TIFFANIE ; d/c planning to [place] today  - c/w pain control

## 2020-03-02 NOTE — SWALLOW VFSS/MBS ASSESSMENT ADULT - RECOMMENDED FEEDING/EATING TECHNIQUES
maintain upright posture during/after eating for 30 mins/no straws/allow for swallow between intakes/crush medication (when feasible)/provide rest periods between swallows/small sips/bites/position upright (90 degrees)

## 2020-03-02 NOTE — SWALLOW VFSS/MBS ASSESSMENT ADULT - ORAL PREPARATORY PHASE
oral holding noted. When questioned following transport of bolus, pt states "trying to get it back" Functional prolonged mastication noted impaired lingual control Insufficient mastication/Poor bolus formation oral holding noted. When questioned once bolus transported, pt states "trying to get it back"

## 2020-03-02 NOTE — SWALLOW VFSS/MBS ASSESSMENT ADULT - ESOPHAGEAL STAGE
Mild retention of material noted in the cervical esophagus Trace amount of retention of material noted in the cervical esophagus Trace amount of retention of material in the cervical esophagus

## 2020-03-02 NOTE — SWALLOW VFSS/MBS ASSESSMENT ADULT - NS SWALLOW VFSS REC ASPIR MON
change of breathing pattern/fever/pneumonia/throat clearing/upper respiratory infection/cough/gurgly voice

## 2020-03-02 NOTE — SWALLOW VFSS/MBS ASSESSMENT ADULT - SLP GENERAL OBSERVATIONS
Pt arrived to radiology in IMELDA chair on 2L O2 via NC. Pt awake, alert, and conversant. Pt able to follow simple directives required for the purpose of this exam. Pt arrived to radiology in IMELDA chair on 2L O2 via NC. Pt awake, alert, and conversant. Pt able to follow simple directives required for the purpose of this exam. Prior to examination, pt verbalized not wanting to return to [facility], case management notified.

## 2020-03-02 NOTE — SWALLOW VFSS/MBS ASSESSMENT ADULT - COMMENTS
Ortho consulted on 2/26 for r hip pain. Right greater trochanter racture, plan for MRI to determine options. Pain control, IS, Regular diet for now, NWB pending MRI, DVT Ppx per medicine. On 2/26 per H&P, pt with weight loss and plan for nutrition and speech and swallow evaluation. Abdominal ultrasound planned 2/2 AAA. Echo planned secondary to CAD. 2/27, pt noted to refuse PO Carafate/Lasix/malox. Pt admitted for epipgastric pain, NPO after midnight for possible EGD. Per ortho on 2/27, given that GT Fx does not completely extend through intertrochanteric region to lesser trochanter, will trial patient OOB to chair and evaluate pain.

## 2020-03-02 NOTE — PROGRESS NOTE ADULT - REASON FOR ADMISSION
S/P fall at the [facility] with RIGHT hip pain

## 2020-03-02 NOTE — PROGRESS NOTE ADULT - PROBLEM SELECTOR PLAN 2
not on ASA  - on valsartan 160mg daily at home  - c/w losartan 100mg daily while inpatient  - TTE results is noted ( EF >50%)
not on ASA  - on valsartan 160mg daily at home  - c/w losartan 100mg daily while inpatient  - f/u TTE
not on ASA  - on valsartan 160mg daily at home  - c/w losartan 100mg daily while inpatient  - TTE results is noted ( EF >50%)

## 2020-03-02 NOTE — PROGRESS NOTE ADULT - SUBJECTIVE AND OBJECTIVE BOX
Patient is a 89y old  Female who presents with a chief complaint of S/P fall at the [facility] with RIGHT hip pain (01 Mar 2020 11:59)      SUBJECTIVE / OVERNIGHT EVENTS:    MEDICATIONS  (STANDING):  heparin  Injectable 5000 Unit(s) SubCutaneous every 12 hours  losartan 100 milliGRAM(s) Oral daily  multivitamin 1 Tablet(s) Oral daily  pantoprazole    Tablet 40 milliGRAM(s) Oral before breakfast  senna 2 Tablet(s) Oral at bedtime    MEDICATIONS  (PRN):  acetaminophen   Tablet .. 650 milliGRAM(s) Oral every 6 hours PRN Moderate Pain (4 - 6)  oxyCODONE    IR 2.5 milliGRAM(s) Oral every 6 hours PRN Severe Pain (7 - 10)      Vital Signs Last 24 Hrs  T(C): 36.6 (02 Mar 2020 06:18), Max: 36.7 (01 Mar 2020 11:38)  T(F): 97.8 (02 Mar 2020 06:18), Max: 98 (01 Mar 2020 11:38)  HR: 60 (02 Mar 2020 06:18) (60 - 76)  BP: 150/70 (02 Mar 2020 06:18) (122/56 - 150/70)  BP(mean): --  RR: 18 (02 Mar 2020 06:18) (18 - 20)  SpO2: 98% (02 Mar 2020 06:18) (93% - 98%)  CAPILLARY BLOOD GLUCOSE        I&O's Summary    01 Mar 2020 07:01  -  02 Mar 2020 07:00  --------------------------------------------------------  IN: 480 mL / OUT: 1150 mL / NET: -670 mL        PHYSICAL EXAM:  GENERAL: NAD, well-developed  HEAD:  Atraumatic, Normocephalic  EYES: EOMI, PERRLA, conjunctiva and sclera clear  NECK: Supple, No JVD  CHEST/LUNG: Clear to auscultation bilaterally; No wheeze  HEART: Regular rate and rhythm; No murmurs, rubs, or gallops  ABDOMEN: Soft, Nontender, Nondistended; Bowel sounds present  EXTREMITIES:  2+ Peripheral Pulses, No clubbing, cyanosis, or edema  PSYCH: AAOx3  NEUROLOGY: non-focal  SKIN: No rashes or lesions    LABS:                    RADIOLOGY & ADDITIONAL TESTS:    Imaging Personally Reviewed:    Consultant(s) Notes Reviewed:      Care Discussed with Consultants/Other Providers: Patient is a 89y old  Female who presents with a chief complaint of S/P fall at the [facility] with RIGHT hip pain (01 Mar 2020 11:59)      SUBJECTIVE / OVERNIGHT EVENTS:  Pt seen and examined with family at bedside. Pt denies any new c/o. Reports right sided hip pain. s/p MBS this AM ; reccs soft diet with thin liquids, food cut is small pieces, small sips only.      MEDICATIONS  (STANDING):  heparin  Injectable 5000 Unit(s) SubCutaneous every 12 hours  losartan 100 milliGRAM(s) Oral daily  multivitamin 1 Tablet(s) Oral daily  pantoprazole    Tablet 40 milliGRAM(s) Oral before breakfast  senna 2 Tablet(s) Oral at bedtime    MEDICATIONS  (PRN):  acetaminophen   Tablet .. 650 milliGRAM(s) Oral every 6 hours PRN Moderate Pain (4 - 6)  oxyCODONE    IR 2.5 milliGRAM(s) Oral every 6 hours PRN Severe Pain (7 - 10)      Vital Signs Last 24 Hrs  T(C): 36.6 (02 Mar 2020 06:18), Max: 36.7 (01 Mar 2020 11:38)  T(F): 97.8 (02 Mar 2020 06:18), Max: 98 (01 Mar 2020 11:38)  HR: 60 (02 Mar 2020 06:18) (60 - 76)  BP: 150/70 (02 Mar 2020 06:18) (122/56 - 150/70)  BP(mean): --  RR: 18 (02 Mar 2020 06:18) (18 - 20)  SpO2: 98% (02 Mar 2020 06:18) (93% - 98%)  CAPILLARY BLOOD GLUCOSE        I&O's Summary    01 Mar 2020 07:01  -  02 Mar 2020 07:00  --------------------------------------------------------  IN: 480 mL / OUT: 1150 mL / NET: -670 mL        PHYSICAL EXAM:  GENERAL: NAD, cachetic, anicteric, afebrile  HEAD:  Atraumatic, Normocephalic  EYES: EOMI, PERRLA, conjunctiva and sclera clear  NECK: Supple, No JVD  CHEST/LUNG: Clear to auscultation bilaterally; No wheeze  HEART: Regular rate and rhythm; No murmurs, rubs, or gallops  ABDOMEN: Soft, Nontender, Nondistended; Bowel sounds present  EXTREMITIES:  2+ Peripheral Pulses, No clubbing, cyanosis, or edema  PSYCH: AAOx3  NEUROLOGY: non-focal  SKIN: No rashes or lesions    LABS: reviewed

## 2020-03-02 NOTE — PROGRESS NOTE ADULT - ASSESSMENT
89 year old female, [place] [facility] with PMH of HTN, CAD, PAD, gout, dysphagia, AAA who presents s/p mechanical fall  found to have R hip fracture pending TIFFANIE placement.
89 year old female, [facility] resident with PMH of HTN, CAD, PAD, gout, dysphagia, AAA who presents s/p mechanical fall  found to have R hip fracture.
89F with R greater troch hip fx    Analgesia  DVT ppx  NWB RLE until MRI performed  Pt would like to avoid surgery if possible, will await MRI results prior to final recommendations  FU MRI today  Will discuss with attending
89 year old female, [place] resident with PMH of HTN, CAD, PAD, gout, dysphagia, AAA who presents s/p mechanical fall  found to have R hip fracture pending TIFFANIE placement.

## 2020-03-02 NOTE — SWALLOW VFSS/MBS ASSESSMENT ADULT - ADDITIONAL INFORMATION
Pt with calcification of the anterior wall of the trachea, calcification of laryngeal structures. Mild air distension noted in the cervical region of the esophagus.

## 2020-03-02 NOTE — SWALLOW VFSS/MBS ASSESSMENT ADULT - ORAL PHASE
Delayed oral transit time/Uncontrolled bolus / spillover in hypopharynx/unable to quantify time of delay as oral cavity out of view piecemeal deglutition noted unable to quantify time of delay as oral cavity out of view, piecemeal deglutition noted/Uncontrolled bolus / spillover in hypopharynx/Delayed oral transit time Delayed oral transit time/unable to quantify time of delay as oral cavity out of view, piecemeal deglutition noted unable to quantify time of delay as oral cavity out of view, piecemeal deglutition noted/Uncontrolled bolus / spillover in hypopharynx unable to quantify time of delay as oral cavity out of view, piecemeal deglutition noted/Delayed oral transit time/Uncontrolled bolus / spillover in hypopharynx Uncontrolled bolus / spillover in hypopharynx unable to quantify time of delay as oral cavity out of view, piecemeal deglutition/Delayed oral transit time

## 2020-03-02 NOTE — PROGRESS NOTE ADULT - PROBLEM SELECTOR PLAN 7
Transitions of Care Status:  1.  Name of PCP:  Pravin Roth MD [phone]  2.  PCP Contacted on Admission: [ ] Y    [x] N    3.  PCP contacted at Discharge: [ ] Y    [ ] N    [ ] N/A  4.  Post-Discharge Appointment Date and Location:  5.  Summary of Handoff given to PCP:
Transitions of Care Status:  1.  Name of PCP:  Pravin Roth MD [phone]  2.  PCP Contacted on Admission: [ ] Y    [x] N    3.  PCP contacted at Discharge: [ ] Y    [ ] N    [ ] N/A  4.  Post-Discharge Appointment Date and Location:  5.  Summary of Handoff given to PCP:
Transitions of Care Status:  1.  Name of PCP:  Pravin Roth MD [phone]  2.  PCP Contacted on Admission: [ ] Y    [x] N    3.  PCP contacted at Discharge: [ ] Y    [ ] N    [ ] N/A  4.  Post-Discharge Appointment Date and Location:  5.  Summary of Handoff given to PCP:
Transitions of Care Status:  1.  Name of PCP:  Pravin Roth MD [phone]  2.  PCP Contacted on Admission: [ ] Y    [x] N    3.  PCP contacted at Discharge: [ ] Y    [ ] N    [ ] N/A  4.  Post-Discharge Appointment Date and Location:  5.  Summary of Handoff given to PCP:

## 2020-03-02 NOTE — DISCHARGE NOTE NURSING/CASE MANAGEMENT/SOCIAL WORK - PATIENT PORTAL LINK FT
You can access the FollowMyHealth Patient Portal offered by [facility] by registering at the following website: [email or web address]. By joining [facility]’s FollowMyHealth portal, you will also be able to view your health information using other applications (apps) compatible with our system.

## 2020-03-02 NOTE — SWALLOW VFSS/MBS ASSESSMENT ADULT - DELAYED INITIATION OF THE PHARYNGEAL SWALLOW (IN SECONDS)
1.4 seconds 1.2 seconds, maximum amount of premature spillage to the level of the valleculae 1.6 seconds, maximum amount of premature spillage to the level of the valleculae and over the laryngeal surface of the epiglottis .9 seconds, maximum amount of premature spillage to the level of the valleculae, passive spillover of vallecular residue over the AE folds .3 second swallow trigger .6 seconds, moderate premature spillage to the level of the valleculae, mild amount of passive spillover from the level of the valleculae to the pyriform sinuses on secondary swallow 2.4 seconds

## 2020-03-02 NOTE — SWALLOW VFSS/MBS ASSESSMENT ADULT - PHARYNGEAL PHASE COMMENTS
moderate amount of passive spillover of oral residue to the valleculae, trace amount of passive spillover of vallecular residue to the level of the pyriform sinuses. Incomplete laryngeal closure observed, no laryngeal penetration or aspiration noted.

## 2020-03-02 NOTE — SWALLOW VFSS/MBS ASSESSMENT ADULT - ORAL PHASE COMMENTS
Pt noted to extend head backwards during hard solid trial. Possible behavioral component to assist with oral transport or material.

## 2020-03-02 NOTE — SWALLOW VFSS/MBS ASSESSMENT ADULT - DIAGNOSTIC IMPRESSIONS
Pt presents with oropharyngeal-cervical esophageal dysphagia marked by prolonged mastication and impaired bolus formation/control, delayed initiation of swallow trigger, premature spillage to the level of the valleculae and pyriform sinuses, reduced hyolaryngeal excursion, trace retention of material in the cervical esophagus, trace-mild amounts of pharyngeal stasis post swallow at the BOT, valleculae, and pyriform sinuses. CP bar noted on this exam and consistent with anatomical findings on past MBS in 2018.    Disorders: reduced lingual strength/ROM, reduced BOT retraction to posterior pharyngeal wall, delayed initiation of swallow trigger, impaired hyolaryngeal excursion, reduced laryngeal closure, impaired UES opening. Pt presents with oropharyngeal-cervical esophageal dysphagia marked by prolonged mastication and impaired bolus formation/control, delayed initiation of swallow trigger, premature spillage to the level of the valleculae and pyriform sinuses, reduced hyolaryngeal excursion, trace retention of material in the cervical esophagus, trace-mild amounts of pharyngeal stasis post swallow at the BOT, valleculae, and pyriform sinuses. CP bar noted on this exam and consistent with anatomical findings on past MBS in 2018. There is a behavioral component to the dysphagia marked by piecemeal deglutition and backward extension of head during the oral phase of swallow.    Disorders: reduced lingual strength/ROM, reduced BOT retraction to posterior pharyngeal wall, delayed initiation of swallow trigger, impaired hyolaryngeal excursion, reduced laryngeal closure, impaired UES opening.

## 2020-05-26 NOTE — PROGRESS NOTE ADULT - PROBLEM SELECTOR PROBLEM 5
Due to the national state of emergency related to COVID-19, [facility] home and clinic visits have been temporarily suspended in the interest of protecting the health of our fragile patients and their families. Phone contact will temporarily replace face to face encounters. Individual emergency preparedness actions families can take are reviewed on every telephone call. Also reviewed with parents are which care team should be contacted in the event that their child develops fever, cough, shortness of breath or increased work of breathing. During each telephone interaction, families are assured of the ongoing support of the [facility] staff during this time. The situation is monitored on a daily basis and we will resume home and/or clinic visits as soon as it is safe to do so. Hospice patients nearing end of life will continue to receive home visits from our clinicians and providers who will utilize all appropriate PPE to prevent transmission of COVID-19. [facility] and [address] [place] [address]  Office:  [phone]  Fax: [phone]      NURSING HOME VISIT NOTE / Telephone Contact    Date of Visit: 05/26/20    Diagnosis:    ICD-10-CM ICD-9-CM    1. 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) Z94.1 V42.1    2. Palliative care encounter Z51.5 V66.7    3. 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 Mom stated Yassine Camargo has been doing well. He welcomed a new brother on April 29th and is very happy to have a brother. Rafia states no adjustment difficulties have been encountered thus far. Yassine Camargo continues on all the same medications as he was taking on our previous visit. He attended clinic at [facility] 18 th where they did a US to evaluate readiness for trach revision.   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 Mom stated they have no difficulty obtaining needed medications or groceries. Mom's partner Reshma Jhaveri is handling all errands outside of the home currently. CODE STATUS:  FULL CODE      Primary Caregiver: [place]   Secondary Caregiver: Reshma Jhaveri (sig. Other)     Family Goals for care:   Disease directed intervention, avoid frequent hospitalizations, maintain good quality of life    NUTRITION:  Wt Readings from Last 3 Encounters:   12/22/19 52 lb 4 oz (23.7 kg) (65 %, Z= 0.39)*   03/13/19 44 lb 1.5 oz (20 kg) (43 %, Z= -0.18)*   03/18/16 23 lb 10.5 oz (10.7 kg) (<1 %, Z= -2.75)*     * Growth percentiles are based on CDC (Boys, 2-20 Years) data. FLU SHOT:   YES      [place] PLAY PERFORMANCE SCALE FOR PEDIATRICS (ages 3-16)    Rating: _90_____    Rating   Description   100   Fully active   90   Minor restrictions in physical strenuous play   80   Restricted in strenuous play, tires more easily, otherwise active   70   Both greater restriction of, and less time spent in active play   60   Ambulatory up to 50% of time, limited active play with assistance / supervision   50 Considerable assistance required for any active play, fully able to engage in quiet play   40   Able to initiate quiet activities   30   Needs considerable assistance for quiet activity   20   Limited to very passive activity initiated by others (e.g., TV)   10   Completely disabled, not even passive play         MEDICATION MANAGEMENT:  Current Outpatient Medications   Medication Sig Dispense Refill    albuterol (PROVENTIL VENTOLIN) 2.5 mg /3 mL (0.083 %) nebu       budesonide (PULMICORT) 0.5 mg/2 mL nbsp       montelukast (SINGULAIR) 4 mg chewable tablet Take 4 mg by mouth.  furosemide (LASIX) 10 mg/mL oral solution Take 20 mg by mouth daily.       tacrolimus (PROGRAF) 1 mg/mL susp 1 mg/mL oral suspension (compounded) 2.5 mg by Gastrostomy Tube route two (2) times a day.  amLODIPine (NORVASC) 1 mg/mL 1 mg/mL oral suspension 7.5 mg by Per G Tube route daily.  acetic acid 0.25 % irrigation Apply 1 Applicator to affected area two (2) times a day. Apply to trach site twice a day      magnesium oxide (MAG-OX) 400 mg tablet 600 mg by Per G Tube route two (2) times a day.  mycophenolate mofetil (CELLCEPT) 200 mg/mL suspension 900 mg by Per G Tube route two (2) times a day.  diphenhydrAMINE (BENADRYL) 12.5 mg/5 mL syrup Take 25 mg by mouth two (2) times a day. Indications: inflammation of the nose due to an allergy      acetaminophen (TYLENOL) 160 mg/5 mL (5 mL) solution Take 325 mg by mouth every six (6) hours as needed for Fever or Pain.  clotrimazole (LOTRIMIN) 1 % topical cream Apply 1 Dose to affected area as needed for Skin Irritation. Apply to area under jaw as needed         ACUITY LEVEL:  [] High /  [] Medium  /  [x] Low      ACTION ITEMS:  1. Continue support and education of family      FOLLOW UP:  Weekly / Monthly and PRN for new or uncontrolled symptoms        Thank you for allowing [facility] to participate in this patient and family's care. Please call the [facility] office at [phone] with any questions or concerns. Weight loss

## 2020-08-26 NOTE — PROGRESS NOTE ADULT - PROBLEM SELECTOR PROBLEM 1
Closed fracture of right hip, initial encounter [Menstruating] : menstruating [Regular Cycle Intervals] : have been regular

## 2020-11-17 ENCOUNTER — INPATIENT (INPATIENT)
Facility: HOSPITAL | Age: 85
LOS: 2 days | Discharge: NURSING, REHAB OR LONG-TERM CARE | DRG: 183 | End: 2020-11-20
Attending: INTERNAL MEDICINE | Admitting: INTERNAL MEDICINE
Payer: MEDICARE

## 2020-11-17 VITALS
SYSTOLIC BLOOD PRESSURE: 198 MMHG | RESPIRATION RATE: 20 BRPM | OXYGEN SATURATION: 96 % | HEART RATE: 69 BPM | DIASTOLIC BLOOD PRESSURE: 77 MMHG | HEIGHT: 65 IN | WEIGHT: 125 LBS | TEMPERATURE: 98 F

## 2020-11-17 DIAGNOSIS — S22.42XA MULTIPLE FRACTURES OF RIBS, LEFT SIDE, INITIAL ENCOUNTER FOR CLOSED FRACTURE: ICD-10-CM

## 2020-11-17 DIAGNOSIS — J38.1 POLYP OF VOCAL CORD AND LARYNX: Chronic | ICD-10-CM

## 2020-11-17 LAB
ALBUMIN SERPL ELPH-MCNC: 4 G/DL — SIGNIFICANT CHANGE UP (ref 3.3–5)
ALP SERPL-CCNC: 76 U/L — SIGNIFICANT CHANGE UP (ref 40–120)
ALT FLD-CCNC: 10 U/L — SIGNIFICANT CHANGE UP (ref 10–45)
ANION GAP SERPL CALC-SCNC: 12 MMOL/L — SIGNIFICANT CHANGE UP (ref 5–17)
AST SERPL-CCNC: 19 U/L — SIGNIFICANT CHANGE UP (ref 10–40)
BASOPHILS # BLD AUTO: 0.05 K/UL — SIGNIFICANT CHANGE UP (ref 0–0.2)
BASOPHILS NFR BLD AUTO: 0.5 % — SIGNIFICANT CHANGE UP (ref 0–2)
BILIRUB SERPL-MCNC: 0.4 MG/DL — SIGNIFICANT CHANGE UP (ref 0.2–1.2)
BUN SERPL-MCNC: 17 MG/DL — SIGNIFICANT CHANGE UP (ref 7–23)
CALCIUM SERPL-MCNC: 9.6 MG/DL — SIGNIFICANT CHANGE UP (ref 8.4–10.5)
CHLORIDE SERPL-SCNC: 102 MMOL/L — SIGNIFICANT CHANGE UP (ref 96–108)
CO2 SERPL-SCNC: 27 MMOL/L — SIGNIFICANT CHANGE UP (ref 22–31)
CREAT SERPL-MCNC: 0.73 MG/DL — SIGNIFICANT CHANGE UP (ref 0.5–1.3)
EOSINOPHIL # BLD AUTO: 0.21 K/UL — SIGNIFICANT CHANGE UP (ref 0–0.5)
EOSINOPHIL NFR BLD AUTO: 2 % — SIGNIFICANT CHANGE UP (ref 0–6)
GLUCOSE SERPL-MCNC: 93 MG/DL — SIGNIFICANT CHANGE UP (ref 70–99)
HCT VFR BLD CALC: 38.2 % — SIGNIFICANT CHANGE UP (ref 34.5–45)
HGB BLD-MCNC: 12.2 G/DL — SIGNIFICANT CHANGE UP (ref 11.5–15.5)
IMM GRANULOCYTES NFR BLD AUTO: 0.4 % — SIGNIFICANT CHANGE UP (ref 0–1.5)
LYMPHOCYTES # BLD AUTO: 1.62 K/UL — SIGNIFICANT CHANGE UP (ref 1–3.3)
LYMPHOCYTES # BLD AUTO: 15.4 % — SIGNIFICANT CHANGE UP (ref 13–44)
MCHC RBC-ENTMCNC: 30.3 PG — SIGNIFICANT CHANGE UP (ref 27–34)
MCHC RBC-ENTMCNC: 31.9 GM/DL — LOW (ref 32–36)
MCV RBC AUTO: 94.8 FL — SIGNIFICANT CHANGE UP (ref 80–100)
MONOCYTES # BLD AUTO: 0.76 K/UL — SIGNIFICANT CHANGE UP (ref 0–0.9)
MONOCYTES NFR BLD AUTO: 7.2 % — SIGNIFICANT CHANGE UP (ref 2–14)
NEUTROPHILS # BLD AUTO: 7.84 K/UL — HIGH (ref 1.8–7.4)
NEUTROPHILS NFR BLD AUTO: 74.5 % — SIGNIFICANT CHANGE UP (ref 43–77)
NRBC # BLD: 0 /100 WBCS — SIGNIFICANT CHANGE UP (ref 0–0)
PLATELET # BLD AUTO: 245 K/UL — SIGNIFICANT CHANGE UP (ref 150–400)
POTASSIUM SERPL-MCNC: 4.3 MMOL/L — SIGNIFICANT CHANGE UP (ref 3.5–5.3)
POTASSIUM SERPL-SCNC: 4.3 MMOL/L — SIGNIFICANT CHANGE UP (ref 3.5–5.3)
PROT SERPL-MCNC: 7 G/DL — SIGNIFICANT CHANGE UP (ref 6–8.3)
RBC # BLD: 4.03 M/UL — SIGNIFICANT CHANGE UP (ref 3.8–5.2)
RBC # FLD: 14.6 % — HIGH (ref 10.3–14.5)
SARS-COV-2 RNA SPEC QL NAA+PROBE: SIGNIFICANT CHANGE UP
SODIUM SERPL-SCNC: 141 MMOL/L — SIGNIFICANT CHANGE UP (ref 135–145)
WBC # BLD: 10.52 K/UL — HIGH (ref 3.8–10.5)
WBC # FLD AUTO: 10.52 K/UL — HIGH (ref 3.8–10.5)

## 2020-11-17 PROCEDURE — 73030 X-RAY EXAM OF SHOULDER: CPT | Mod: 26,LT

## 2020-11-17 PROCEDURE — 99285 EMERGENCY DEPT VISIT HI MDM: CPT | Mod: CS,GC

## 2020-11-17 PROCEDURE — 73590 X-RAY EXAM OF LOWER LEG: CPT | Mod: 26,LT

## 2020-11-17 PROCEDURE — 73562 X-RAY EXAM OF KNEE 3: CPT | Mod: 26,LT

## 2020-11-17 PROCEDURE — 73060 X-RAY EXAM OF HUMERUS: CPT | Mod: 26,LT

## 2020-11-17 PROCEDURE — 71250 CT THORAX DX C-: CPT | Mod: 26

## 2020-11-17 PROCEDURE — 73090 X-RAY EXAM OF FOREARM: CPT | Mod: 26,LT

## 2020-11-17 PROCEDURE — 71045 X-RAY EXAM CHEST 1 VIEW: CPT | Mod: 26

## 2020-11-17 PROCEDURE — 73080 X-RAY EXAM OF ELBOW: CPT | Mod: 26,LT

## 2020-11-17 PROCEDURE — 73552 X-RAY EXAM OF FEMUR 2/>: CPT | Mod: 26,LT

## 2020-11-17 PROCEDURE — 70450 CT HEAD/BRAIN W/O DYE: CPT | Mod: 26

## 2020-11-17 PROCEDURE — 72125 CT NECK SPINE W/O DYE: CPT | Mod: 26

## 2020-11-17 PROCEDURE — 72170 X-RAY EXAM OF PELVIS: CPT | Mod: 26

## 2020-11-17 RX ORDER — OXYCODONE HYDROCHLORIDE 5 MG/1
2.5 TABLET ORAL EVERY 6 HOURS
Refills: 0 | Status: DISCONTINUED | OUTPATIENT
Start: 2020-11-17 | End: 2020-11-20

## 2020-11-17 RX ORDER — ACETAMINOPHEN 500 MG
650 TABLET ORAL EVERY 6 HOURS
Refills: 0 | Status: DISCONTINUED | OUTPATIENT
Start: 2020-11-17 | End: 2020-11-20

## 2020-11-17 RX ORDER — VALSARTAN 80 MG/1
1 TABLET ORAL
Qty: 0 | Refills: 0 | DISCHARGE

## 2020-11-17 RX ORDER — PREGABALIN 225 MG/1
1 CAPSULE ORAL
Qty: 0 | Refills: 0 | DISCHARGE

## 2020-11-17 RX ORDER — PANTOPRAZOLE SODIUM 20 MG/1
40 TABLET, DELAYED RELEASE ORAL
Refills: 0 | Status: DISCONTINUED | OUTPATIENT
Start: 2020-11-17 | End: 2020-11-20

## 2020-11-17 RX ORDER — HEPARIN SODIUM 5000 [USP'U]/ML
5000 INJECTION INTRAVENOUS; SUBCUTANEOUS EVERY 8 HOURS
Refills: 0 | Status: DISCONTINUED | OUTPATIENT
Start: 2020-11-17 | End: 2020-11-20

## 2020-11-17 RX ORDER — LIDOCAINE 4 G/100G
1 CREAM TOPICAL ONCE
Refills: 0 | Status: COMPLETED | OUTPATIENT
Start: 2020-11-17 | End: 2020-11-17

## 2020-11-17 RX ORDER — ACETAMINOPHEN 500 MG
975 TABLET ORAL ONCE
Refills: 0 | Status: COMPLETED | OUTPATIENT
Start: 2020-11-17 | End: 2020-11-17

## 2020-11-17 RX ORDER — SENNA PLUS 8.6 MG/1
2 TABLET ORAL AT BEDTIME
Refills: 0 | Status: DISCONTINUED | OUTPATIENT
Start: 2020-11-17 | End: 2020-11-20

## 2020-11-17 RX ADMIN — SENNA PLUS 2 TABLET(S): 8.6 TABLET ORAL at 22:08

## 2020-11-17 RX ADMIN — LIDOCAINE 1 PATCH: 4 CREAM TOPICAL at 11:58

## 2020-11-17 RX ADMIN — Medication 1 TABLET(S): at 18:29

## 2020-11-17 RX ADMIN — HEPARIN SODIUM 5000 UNIT(S): 5000 INJECTION INTRAVENOUS; SUBCUTANEOUS at 22:08

## 2020-11-17 RX ADMIN — OXYCODONE HYDROCHLORIDE 2.5 MILLIGRAM(S): 5 TABLET ORAL at 23:45

## 2020-11-17 RX ADMIN — OXYCODONE HYDROCHLORIDE 2.5 MILLIGRAM(S): 5 TABLET ORAL at 22:09

## 2020-11-17 RX ADMIN — LIDOCAINE 1 PATCH: 4 CREAM TOPICAL at 22:09

## 2020-11-17 RX ADMIN — Medication 975 MILLIGRAM(S): at 14:40

## 2020-11-17 RX ADMIN — LIDOCAINE 1 PATCH: 4 CREAM TOPICAL at 19:38

## 2020-11-17 RX ADMIN — Medication 975 MILLIGRAM(S): at 11:57

## 2020-11-17 NOTE — H&P ADULT - ASSESSMENT
89 y.o. female hx of multiple falls presents from [facility] assisted living s/p unwitnessed nonsyncopal fall. Patient stated she was wearing slippers, getting dressed and then fell onto her left side. Denies hitting her head. Was able to get up with assitance after. Normally ambulates without assitance. EMS found patient hypoxi c to 90%, warmed her extremities and placed her on2L and she improved to 100%. Patient had abrasions on left forearm and left knee. Endorsing pain to left arm and left knee. Denies chest pain, shortness of breath, headache, visual changes, nausea, vomiting.    s/p fall with rib fx  - PT eval  - pain control     HTN  - c/w home meds    GERD  - protonic    DVT px  - sq heparin

## 2020-11-17 NOTE — ED ADULT NURSE REASSESSMENT NOTE - NS ED NURSE REASSESS COMMENT FT1
Report received from KAIA Cordova. Pt AAOx4, NAD, resp nonlabored, skin warm/dry, resting comfortably in bed. Pt denies headache, dizziness, chest pain, palpitations, SOB, abd pain, n/v/d, urinary symptoms, fevers, chills, weakness at this time. Safety and comfort measures provided bed placed in lowest position and side rails raised. Pt oriented to call bell system.

## 2020-11-17 NOTE — ED ADULT NURSE NOTE - NSIMPLEMENTINTERV_GEN_ALL_ED
Implemented All Fall Risk Interventions:  [place] to call system. Call bell, personal items and telephone within reach. Instruct patient to call for assistance. Room bathroom lighting operational. Non-slip footwear when patient is off stretcher. Physically safe environment: no spills, clutter or unnecessary equipment. Stretcher in lowest position, wheels locked, appropriate side rails in place. Provide visual cue, wrist band, yellow gown, etc. Monitor gait and stability. Monitor for mental status changes and reorient to person, place, and time. Review medications for side effects contributing to fall risk. Reinforce activity limits and safety measures with patient and family.

## 2020-11-17 NOTE — H&P ADULT - NSICDXFAMILYHX_GEN_ALL_CORE_FT
FAMILY HISTORY:  Sibling  Still living? Unknown  Family history of cancer, Age at diagnosis: Age Unknown

## 2020-11-17 NOTE — H&P ADULT - NSHPLABSRESULTS_GEN_ALL_CORE
LABS:                        12.2   10.52 )-----------( 245      ( 17 Nov 2020 13:56 )             38.2     11-17    141  |  102  |  17  ----------------------------<  93  4.3   |  27  |  0.73    Ca    9.6      17 Nov 2020 13:56    TPro  7.0  /  Alb  4.0  /  TBili  0.4  /  DBili  x   /  AST  19  /  ALT  10  /  AlkPhos  76  11-17              RADIOLOGY & ADDITIONAL TESTS:

## 2020-11-17 NOTE — ED PROVIDER NOTE - NS ED ROS FT
CONSTITUTIONAL: No fevers, chills, fatigue, dizziness, weakness, unexpected weight change  HEENT: No loss of vision, double vision, blurry vision, nasal congestion, runny nose, sore throat  CV: No chest pain, palpitations  PULM: No cough, shortness of breath  GI: No abdominal pain, nausea, vomiting, diarrhea, constipation  : No dysuria, polyuria, hematuria  SKIN: ABRASION TO LEFT KNEE AND FOREARM  MSK: LEFT SHOULDER AND KNEE PAIN.  NEURO: No headache, paresthesias

## 2020-11-17 NOTE — ED PROVIDER NOTE - PHYSICAL EXAMINATION
GENERAL: elderly female, lying in bed, NAD, hypoxic to 91% improved to 97% on 2L. Otherwise Vital signs are within normal limits  HEENT: NC/AT, conjunctiva noninjected, sclera anicteric, moist mucous membranes,  NECK: Supple, trachea midline  LUNG: Nonlabored respirations  CV: RRR, Pulses- Radial: 2+ b/l  ABDOMEN: Nondistended, notender  MSK: No visible deformities, tender over left elbow and left knee. Tender over left lateral chest wall but no pinpoint tenderness. No tenderness of pelvis.  NEURO: AAOx3 (to person, place, time), no tremor  PSYCH: Normal mood and affect GENERAL: elderly female, lying in bed, NAD, hypoxic to 91% improved to 97% on 2L. Otherwise Vital signs are within normal limits  HEENT: NC/AT, conjunctiva noninjected, sclera anicteric, moist mucous membranes,  NECK: Supple, trachea midline, full range of motion  LUNG: Nonlabored respirations, CTA b/l  CV: RRR, Pulses- Radial: 2+ bilateral, DP/PT 2+ b/l  ABDOMEN: Nondistended, nontender  MSK: No visible deformities, tender over left elbow and left knee. Tender over left lateral chest wall but no pinpoint tenderness. No tenderness of pelvis.  NEURO: AAOx3 (to person, place, time), no tremor  SKIN: Skin abrasions left forearm and left knee. No visible foreign bodies. Darkening of skin anterior tib bilateral consistent with PVD  PSYCH: Normal mood and affect

## 2020-11-17 NOTE — ED PROVIDER NOTE - CARE PLAN
Principal Discharge DX:	Closed fracture of multiple ribs of left side, initial encounter  Goal:	3rd, 4th

## 2020-11-17 NOTE — ED ADULT NURSE NOTE - OBJECTIVE STATEMENT
Pt is a 89 y/o F, PMH HTN, PVD, BIBEMS from [place] assisted living facility s/p fall. Pt states she went to get something to drink, was not wearing any footwear with traction, slipped onto her L side and hit her head. Pt denies LOC. Pt noted to have approx 2 inch circular skin tear to L elbow and L upper forearm. Pt endorses b/l rib pain and L knee pain. No ecchymosis noted to pt rib area. Pt noted to be 91% on RA, improvement to 97% with 2L NC. Pt denies being on O2 at home. Pt denies headache, dizziness, chest pain, palpitations, cough, SOB, abdominal pain, n/v/d, fevers, chills at this time. Pt is currently A&Ox3 (as per EMS A&Ox3 at baseline), moves all extremities. Pt changed into hospital gown, resting comfortably in bed with call bell at bedside and safety maintained. See neuro flow sheet in pt chart.

## 2020-11-17 NOTE — ED PROVIDER NOTE - OBJECTIVE STATEMENT
89 y.o. female hx of multiple falls presents from [facility] assisted living s/p unwitnessed nonsyncopal fall. Patient stated she was wearing slippers, getting dressed and then fell onto her left side. Denies hitting her head. 89 y.o. female hx of multiple falls presents from [facility] assisted living s/p unwitnessed nonsyncopal fall. Patient stated she was wearing slippers, getting dressed and then fell onto her left side. Denies hitting her head. Was able to 89 y.o. female hx of multiple falls presents from [facility] assisted living s/p unwitnessed nonsyncopal fall. Patient stated she was wearing slippers, getting dressed and then fell onto her left side. Denies hitting her head. Was able to get up with assitance after. Normally ambulates without assitance. EMS found patient hypoxi c to 90%, warmed her extremities and placed her on2L and she improved to 100%. Patient had abrasions on left forearm and left knee. Endorsing pain to left arm and left knee. Denies chest pain, shortness of breath, headache, visual changes, nausea, vomiting.

## 2020-11-17 NOTE — H&P ADULT - HISTORY OF PRESENT ILLNESS
89 y.o. female hx of multiple falls presents from [facility] assisted living s/p unwitnessed nonsyncopal fall. Patient stated she was wearing slippers, getting dressed and then fell onto her left side. Denies hitting her head. Was able to get up with assitance after. Normally ambulates without assitance. EMS found patient hypoxi c to 90%, warmed her extremities and placed her on2L and she improved to 100%. Patient had abrasions on left forearm and left knee. Endorsing pain to left arm and left knee. Denies chest pain, shortness of breath, headache, visual changes, nausea, vomiting.

## 2020-11-18 ENCOUNTER — TRANSCRIPTION ENCOUNTER (OUTPATIENT)
Age: 85
End: 2020-11-18

## 2020-11-18 LAB
ANION GAP SERPL CALC-SCNC: 13 MMOL/L — SIGNIFICANT CHANGE UP (ref 5–17)
BUN SERPL-MCNC: 16 MG/DL — SIGNIFICANT CHANGE UP (ref 7–23)
CALCIUM SERPL-MCNC: 9.5 MG/DL — SIGNIFICANT CHANGE UP (ref 8.4–10.5)
CHLORIDE SERPL-SCNC: 104 MMOL/L — SIGNIFICANT CHANGE UP (ref 96–108)
CO2 SERPL-SCNC: 25 MMOL/L — SIGNIFICANT CHANGE UP (ref 22–31)
CREAT SERPL-MCNC: 0.77 MG/DL — SIGNIFICANT CHANGE UP (ref 0.5–1.3)
FOLATE SERPL-MCNC: >20 NG/ML — SIGNIFICANT CHANGE UP
GLUCOSE SERPL-MCNC: 77 MG/DL — SIGNIFICANT CHANGE UP (ref 70–99)
HCT VFR BLD CALC: 35.4 % — SIGNIFICANT CHANGE UP (ref 34.5–45)
HGB BLD-MCNC: 11.3 G/DL — LOW (ref 11.5–15.5)
MAGNESIUM SERPL-MCNC: 2.2 MG/DL — SIGNIFICANT CHANGE UP (ref 1.6–2.6)
MCHC RBC-ENTMCNC: 30.4 PG — SIGNIFICANT CHANGE UP (ref 27–34)
MCHC RBC-ENTMCNC: 31.9 GM/DL — LOW (ref 32–36)
MCV RBC AUTO: 95.2 FL — SIGNIFICANT CHANGE UP (ref 80–100)
NRBC # BLD: 0 /100 WBCS — SIGNIFICANT CHANGE UP (ref 0–0)
PHOSPHATE SERPL-MCNC: 3.8 MG/DL — SIGNIFICANT CHANGE UP (ref 2.5–4.5)
PLATELET # BLD AUTO: 235 K/UL — SIGNIFICANT CHANGE UP (ref 150–400)
POTASSIUM SERPL-MCNC: 4 MMOL/L — SIGNIFICANT CHANGE UP (ref 3.5–5.3)
POTASSIUM SERPL-SCNC: 4 MMOL/L — SIGNIFICANT CHANGE UP (ref 3.5–5.3)
RBC # BLD: 3.72 M/UL — LOW (ref 3.8–5.2)
RBC # FLD: 15 % — HIGH (ref 10.3–14.5)
SODIUM SERPL-SCNC: 142 MMOL/L — SIGNIFICANT CHANGE UP (ref 135–145)
TSH SERPL-MCNC: 2.59 UIU/ML — SIGNIFICANT CHANGE UP (ref 0.27–4.2)
VIT B12 SERPL-MCNC: 459 PG/ML — SIGNIFICANT CHANGE UP (ref 232–1245)
WBC # BLD: 6.93 K/UL — SIGNIFICANT CHANGE UP (ref 3.8–10.5)
WBC # FLD AUTO: 6.93 K/UL — SIGNIFICANT CHANGE UP (ref 3.8–10.5)

## 2020-11-18 RX ORDER — SENNA PLUS 8.6 MG/1
2 TABLET ORAL
Qty: 0 | Refills: 0 | DISCHARGE
Start: 2020-11-18

## 2020-11-18 RX ORDER — OXYCODONE HYDROCHLORIDE 5 MG/1
2.5 TABLET ORAL
Qty: 0 | Refills: 0 | DISCHARGE
Start: 2020-11-18

## 2020-11-18 RX ADMIN — OXYCODONE HYDROCHLORIDE 2.5 MILLIGRAM(S): 5 TABLET ORAL at 05:32

## 2020-11-18 RX ADMIN — Medication 650 MILLIGRAM(S): at 10:55

## 2020-11-18 RX ADMIN — Medication 650 MILLIGRAM(S): at 11:25

## 2020-11-18 RX ADMIN — HEPARIN SODIUM 5000 UNIT(S): 5000 INJECTION INTRAVENOUS; SUBCUTANEOUS at 05:33

## 2020-11-18 RX ADMIN — Medication 1 TABLET(S): at 11:02

## 2020-11-18 RX ADMIN — SENNA PLUS 2 TABLET(S): 8.6 TABLET ORAL at 22:34

## 2020-11-18 RX ADMIN — HEPARIN SODIUM 5000 UNIT(S): 5000 INJECTION INTRAVENOUS; SUBCUTANEOUS at 22:34

## 2020-11-18 RX ADMIN — HEPARIN SODIUM 5000 UNIT(S): 5000 INJECTION INTRAVENOUS; SUBCUTANEOUS at 17:26

## 2020-11-18 RX ADMIN — PANTOPRAZOLE SODIUM 40 MILLIGRAM(S): 20 TABLET, DELAYED RELEASE ORAL at 05:32

## 2020-11-18 NOTE — DISCHARGE NOTE PROVIDER - HOSPITAL COURSE
89 y.o. female hx of multiple falls presents from [facility] assisted living s/p unwitnessed nonsyncopal fall. Patient stated she was wearing slippers, getting dressed and then fell onto her left side. Denies hitting her head. Was able to get up with assitance after. Normally ambulates without assitance. EMS found patient hypoxi c to 90%, warmed her extremities and placed her on2L and she improved to 100%. Patient had abrasions on left forearm and left knee. Endorsing pain to left arm and left knee. Denies chest pain, shortness of breath, headache, visual changes, nausea, vomiting. Patient had a ct chest done which show fracture of left 3rd and 4 th rib. Patient was seen by Physical therapy and advised TIFFANIE.  Patient tolerated PT with pain management.     89 y.o. female hx of multiple falls presents from [facility] assisted living s/p unwitnessed nonsyncopal fall. Patient stated she was wearing slippers, getting dressed and then fell onto her left side. Denies hitting her head. Was able to get up with assitance after. Normally ambulates without assitance. EMS found patient hypoxi c to 90%, warmed her extremities and placed her on2L and she improved to 100%. Patient had abrasions on left forearm and left knee. Endorsing pain to left arm and left knee. Denies chest pain, shortness of breath, headache, visual changes, nausea, vomiting. Patient had a ct chest done which show fracture of left 3rd and 4 th rib. Patient was seen by Physical therapy and advised TIFFANIE.  Patient tolerated PT with pain management. Patient cleared for discharge to rehab     89 y.o. female hx of multiple falls presents from [facility] assisted living s/p unwitnessed nonsyncopal fall. Patient stated she was wearing slippers, getting dressed and then fell onto her   left side. Denies hitting her head. Was able to get up with assitance after. Normally ambulates without assitance. EMS found patient hypoxi c to 90%, warmed her extremities and   placed her on2L and she improved to 100%. Patient had abrasions on left forearm and left knee. Endorsing pain to left arm and left knee. Denies chest pain, shortness of breath,   headache, visual changes, nausea, vomiting. Patient had a ct chest done which show fracture of left 3rd and 4 th rib. Patient was seen by Physical therapy and advised TIFFANIE.  Patient   tolerated PT with pain management. Patient cleared for discharge to rehab     89 y.o. female hx of multiple falls presents from [facility] assisted living s/p unwitnessed nonsyncopal fall. Patient stated she was wearing slippers, getting dressed and then fell onto her   left side. Denies hitting her head. Was able to get up with assitance after. Normally ambulates without assitance. EMS found patient hypoxi c to 90%, warmed her extremities and   placed her on2L and she improved to 100%. Patient had abrasions on left forearm and left knee. Endorsing pain to left arm and left knee. Denies chest pain, shortness of breath,   headache, visual changes, nausea, vomiting. Patient had a ct chest done which show fracture of left 3rd and 4 th rib. Patient was seen by Physical therapy and advised TIFFANIE.  Patient   tolerated PT with pain management. Patient cleared for discharge to rehab. Discharge medication reconciliation DW dr Mclain.

## 2020-11-18 NOTE — DISCHARGE NOTE PROVIDER - CARE PROVIDER_API CALL
Jens Leary  follow up with Rehab physician and after d/c form rehab follow up with the [facility] physician  Phone: (   )    -  Fax: (   )    -  Follow Up Time:

## 2020-11-18 NOTE — DISCHARGE NOTE PROVIDER - NSDCCPCAREPLAN_GEN_ALL_CORE_FT
PRINCIPAL DISCHARGE DIAGNOSIS  Diagnosis: Closed fracture of multiple ribs of left side, initial encounter  Assessment and Plan of Treatment: fracture of the 3rd and fourth rib, pain managment and PT.       PRINCIPAL DISCHARGE DIAGNOSIS  Diagnosis: Closed fracture of multiple ribs of left side, initial encounter  Assessment and Plan of Treatment: fracture of the 3rd and fourth rib, pain managment and PT ar rehab      SECONDARY DISCHARGE DIAGNOSES  Diagnosis: Constipation in female  Assessment and Plan of Treatment: senna daily and monitor BM     PRINCIPAL DISCHARGE DIAGNOSIS  Diagnosis: Closed fracture of multiple ribs of left side, initial encounter  Assessment and Plan of Treatment: fracture of the 3rd and fourth rib, pain managment and PT ar rehab      SECONDARY DISCHARGE DIAGNOSES  Diagnosis: Falls  Assessment and Plan of Treatment: Fall precautions    Diagnosis: HTN (hypertension)  Assessment and Plan of Treatment:     Diagnosis: Constipation in female  Assessment and Plan of Treatment: senna daily and monitor BM     PRINCIPAL DISCHARGE DIAGNOSIS  Diagnosis: Closed fracture of multiple ribs of left side, initial encounter  Assessment and Plan of Treatment: fracture of the 3rd and fourth rib, pain managment and PT ar rehab      SECONDARY DISCHARGE DIAGNOSES  Diagnosis: Falls  Assessment and Plan of Treatment: Fall precautions    Diagnosis: HTN (hypertension)  Assessment and Plan of Treatment: Low salt diet  Activity as tolerated.  Take all medication as prescribed.  Follow up with your medical doctor for routine blood pressure monitoring at your next visit.  Notify your doctor if you have any of the following symptoms:   Dizziness, Lightheadedness, Blurry vision, Headache, Chest pain, Shortness of breath      Diagnosis: Constipation in female  Assessment and Plan of Treatment: senna daily and monitor BM

## 2020-11-18 NOTE — DISCHARGE NOTE PROVIDER - PROVIDER TOKENS
FREE:[LAST:[Dr],FIRST:[Jens],PHONE:[(   )    -],FAX:[(   )    -],ADDRESS:[follow up with Rehab physician and after d/c form rehab follow up with the [facility] physician]]

## 2020-11-18 NOTE — DISCHARGE NOTE PROVIDER - NSDCMRMEDTOKEN_GEN_ALL_CORE_FT
acetaminophen 325 mg oral tablet: 2 tab(s) orally every 6 hours, As needed, Moderate Pain (4 - 6)  Multiple Vitamins oral tablet: 1 tab(s) orally once a day  oxyCODONE 5 mg/5 mL oral solution: 2.5 milliliter(s) orally every 6 hours, As needed, Moderate Pain (4 - 6)  pantoprazole 40 mg oral delayed release tablet: 1 tab(s) orally once a day (before a meal)  senna oral tablet: 2 tab(s) orally once a day (at bedtime)  valsartan 160 mg oral tablet: 1 tab(s) orally once a day  Vitamin B-12 1000 mcg oral tablet: 1 tab(s) orally once a day

## 2020-11-18 NOTE — PHYSICAL THERAPY INITIAL EVALUATION ADULT - PERTINENT HX OF CURRENT PROBLEM, REHAB EVAL
89 y.o. female hx of multiple falls presents from [facility] assisted living s/p unwitnessed nonsyncopal fall. Patient stated she was wearing slippers, getting dressed and then fell onto her left side. Denies hitting her head. Was able to get up with assistance after.  CT C spine, L shoulder, L pelvis, L knee, L forearm - no acute fx/dislocation. CT Chest-acute fx L 3rd,4th ribs

## 2020-11-18 NOTE — PROGRESS NOTE ADULT - ASSESSMENT
89 y.o. female hx of multiple falls presents from [facility] assisted living s/p unwitnessed nonsyncopal fall. Patient stated she was wearing slippers, getting dressed and then fell onto her left side. Denies hitting her head. Was able to get up with assitance after. Normally ambulates without assitance. EMS found patient hypoxi c to 90%, warmed her extremities and placed her on2L and she improved to 100%. Patient had abrasions on left forearm and left knee. Endorsing pain to left arm and left knee. Denies chest pain, shortness of breath, headache, visual changes, nausea, vomiting.    s/p fall with rib fx  - PT eval  - pain control     HTN  - c/w home meds    GERD  - protonic    DVT px  - sq heparin    dispo  - TIFFANIE

## 2020-11-18 NOTE — PROGRESS NOTE ADULT - SUBJECTIVE AND OBJECTIVE BOX
Patient is a 89y old  Female who presents with a chief complaint of s/p fall (17 Nov 2020 14:25)      SUBJECTIVE / OVERNIGHT EVENTS:  No chest pain. No shortness of breath. No complaints. No events overnight. good apetitte.    MEDICATIONS  (STANDING):  heparin   Injectable 5000 Unit(s) SubCutaneous every 8 hours  multivitamin 1 Tablet(s) Oral daily  pantoprazole    Tablet 40 milliGRAM(s) Oral before breakfast  senna 2 Tablet(s) Oral at bedtime    MEDICATIONS  (PRN):  acetaminophen   Tablet .. 650 milliGRAM(s) Oral every 6 hours PRN Temp greater or equal to 38C (100.4F), Mild Pain (1 - 3)  oxyCODONE    Solution 2.5 milliGRAM(s) Oral every 6 hours PRN Moderate Pain (4 - 6)      Vital Signs Last 24 Hrs  T(C): 36.3 (18 Nov 2020 09:42), Max: 36.6 (17 Nov 2020 20:03)  T(F): 97.3 (18 Nov 2020 09:42), Max: 97.8 (17 Nov 2020 20:03)  HR: 61 (18 Nov 2020 10:25) (57 - 67)  BP: 135/62 (18 Nov 2020 10:25) (129/72 - 175/72)  BP(mean): 102 (17 Nov 2020 14:50) (102 - 102)  RR: 18 (18 Nov 2020 10:25) (16 - 18)  SpO2: 93% (18 Nov 2020 10:25) (93% - 100%)  CAPILLARY BLOOD GLUCOSE        I&O's Summary      PHYSICAL EXAM:  GENERAL: NAD, well-developed  HEAD:  Atraumatic, Normocephalic  EYES: EOMI, PERRLA, conjunctiva and sclera clear  NECK: Supple, No JVD  CHEST/LUNG: Clear to auscultation bilaterally; No wheeze  HEART: Regular rate and rhythm; No murmurs, rubs, or gallops  ABDOMEN: Soft, Nontender, Nondistended; Bowel sounds present  EXTREMITIES:  2+ Peripheral Pulses, No clubbing, cyanosis, or edema  PSYCH: AAOx3  NEUROLOGY: non-focal  SKIN: No rashes or lesions    LABS:                        11.3   6.93  )-----------( 235      ( 18 Nov 2020 07:24 )             35.4     11-18    142  |  104  |  16  ----------------------------<  77  4.0   |  25  |  0.77    Ca    9.5      18 Nov 2020 07:19  Phos  3.8     11-18  Mg     2.2     11-18    TPro  7.0  /  Alb  4.0  /  TBili  0.4  /  DBili  x   /  AST  19  /  ALT  10  /  AlkPhos  76  11-17              RADIOLOGY & ADDITIONAL TESTS:    Imaging Personally Reviewed:    Consultant(s) Notes Reviewed:      Care Discussed with Consultants/Other Providers:

## 2020-11-19 LAB
SARS-COV-2 IGG SERPL QL IA: NEGATIVE — SIGNIFICANT CHANGE UP
SARS-COV-2 IGM SERPL IA-ACNC: <0.1 INDEX — SIGNIFICANT CHANGE UP
SARS-COV-2 RNA SPEC QL NAA+PROBE: SIGNIFICANT CHANGE UP

## 2020-11-19 RX ADMIN — HEPARIN SODIUM 5000 UNIT(S): 5000 INJECTION INTRAVENOUS; SUBCUTANEOUS at 15:40

## 2020-11-19 RX ADMIN — HEPARIN SODIUM 5000 UNIT(S): 5000 INJECTION INTRAVENOUS; SUBCUTANEOUS at 07:09

## 2020-11-19 RX ADMIN — HEPARIN SODIUM 5000 UNIT(S): 5000 INJECTION INTRAVENOUS; SUBCUTANEOUS at 22:12

## 2020-11-19 RX ADMIN — SENNA PLUS 2 TABLET(S): 8.6 TABLET ORAL at 22:12

## 2020-11-19 RX ADMIN — PANTOPRAZOLE SODIUM 40 MILLIGRAM(S): 20 TABLET, DELAYED RELEASE ORAL at 07:09

## 2020-11-19 RX ADMIN — Medication 1 TABLET(S): at 15:40

## 2020-11-19 RX ADMIN — OXYCODONE HYDROCHLORIDE 2.5 MILLIGRAM(S): 5 TABLET ORAL at 16:07

## 2020-11-19 RX ADMIN — Medication 650 MILLIGRAM(S): at 15:41

## 2020-11-19 NOTE — PROGRESS NOTE ADULT - SUBJECTIVE AND OBJECTIVE BOX
Patient is a 89y old  Female who presents with a chief complaint of s/p fall (18 Nov 2020 17:53)      SUBJECTIVE / OVERNIGHT EVENTS:  No chest pain. No shortness of breath. No complaints. No events overnight.     MEDICATIONS  (STANDING):  heparin   Injectable 5000 Unit(s) SubCutaneous every 8 hours  multivitamin 1 Tablet(s) Oral daily  pantoprazole    Tablet 40 milliGRAM(s) Oral before breakfast  senna 2 Tablet(s) Oral at bedtime    MEDICATIONS  (PRN):  acetaminophen   Tablet .. 650 milliGRAM(s) Oral every 6 hours PRN Temp greater or equal to 38C (100.4F), Mild Pain (1 - 3)  oxyCODONE    Solution 2.5 milliGRAM(s) Oral every 6 hours PRN Moderate Pain (4 - 6)      Vital Signs Last 24 Hrs  T(C): 36.5 (19 Nov 2020 08:49), Max: 36.6 (18 Nov 2020 15:57)  T(F): 97.7 (19 Nov 2020 08:49), Max: 97.8 (18 Nov 2020 15:57)  HR: 64 (19 Nov 2020 08:49) (64 - 70)  BP: 186/77 (19 Nov 2020 08:49) (147/73 - 186/77)  BP(mean): --  RR: 18 (19 Nov 2020 08:49) (18 - 18)  SpO2: 93% (19 Nov 2020 08:49) (93% - 98%)  CAPILLARY BLOOD GLUCOSE        I&O's Summary    18 Nov 2020 07:01  -  19 Nov 2020 07:00  --------------------------------------------------------  IN: 150 mL / OUT: 0 mL / NET: 150 mL        PHYSICAL EXAM:  GENERAL: NAD, well-developed  HEAD:  Atraumatic, Normocephalic  EYES: EOMI, PERRLA, conjunctiva and sclera clear  NECK: Supple, No JVD  CHEST/LUNG: Clear to auscultation bilaterally; No wheeze  HEART: Regular rate and rhythm; No murmurs, rubs, or gallops  ABDOMEN: Soft, Nontender, Nondistended; Bowel sounds present  EXTREMITIES:  2+ Peripheral Pulses, No clubbing, cyanosis, or edema  PSYCH: AAOx1  NEUROLOGY: non-focal  SKIN: No rashes or lesions    LABS:                        11.3   6.93  )-----------( 235      ( 18 Nov 2020 07:24 )             35.4     11-18    142  |  104  |  16  ----------------------------<  77  4.0   |  25  |  0.77    Ca    9.5      18 Nov 2020 07:19  Phos  3.8     11-18  Mg     2.2     11-18    TPro  7.0  /  Alb  4.0  /  TBili  0.4  /  DBili  x   /  AST  19  /  ALT  10  /  AlkPhos  76  11-17              RADIOLOGY & ADDITIONAL TESTS:    Imaging Personally Reviewed:    Consultant(s) Notes Reviewed:      Care Discussed with Consultants/Other Providers:

## 2020-11-19 NOTE — PROGRESS NOTE ADULT - ASSESSMENT
89 y.o. female hx of multiple falls presents from [facility] assisted living s/p unwitnessed nonsyncopal fall. Patient stated she was wearing slippers, getting dressed and then fell onto her left side. Denies hitting her head. Was able to get up with assitance after. Normally ambulates without assitance. EMS found patient hypoxi c to 90%, warmed her extremities and placed her on2L and she improved to 100%. Patient had abrasions on left forearm and left knee. Endorsing pain to left arm and left knee. Denies chest pain, shortness of breath, headache, visual changes, nausea, vomiting.    s/p fall with rib fx  - PT eval  - pain control     HTN  - c/w home meds    GERD  - protonic    DVT px  - sq heparin    dispo  - TIFFANIE today

## 2020-11-20 ENCOUNTER — TRANSCRIPTION ENCOUNTER (OUTPATIENT)
Age: 85
End: 2020-11-20

## 2020-11-20 VITALS
RESPIRATION RATE: 18 BRPM | DIASTOLIC BLOOD PRESSURE: 61 MMHG | TEMPERATURE: 98 F | SYSTOLIC BLOOD PRESSURE: 145 MMHG | OXYGEN SATURATION: 93 % | HEART RATE: 69 BPM

## 2020-11-20 PROCEDURE — 85027 COMPLETE CBC AUTOMATED: CPT

## 2020-11-20 PROCEDURE — 73590 X-RAY EXAM OF LOWER LEG: CPT

## 2020-11-20 PROCEDURE — 86769 SARS-COV-2 COVID-19 ANTIBODY: CPT

## 2020-11-20 PROCEDURE — 72170 X-RAY EXAM OF PELVIS: CPT

## 2020-11-20 PROCEDURE — 84100 ASSAY OF PHOSPHORUS: CPT

## 2020-11-20 PROCEDURE — 71250 CT THORAX DX C-: CPT

## 2020-11-20 PROCEDURE — 73080 X-RAY EXAM OF ELBOW: CPT

## 2020-11-20 PROCEDURE — U0003: CPT

## 2020-11-20 PROCEDURE — 80053 COMPREHEN METABOLIC PANEL: CPT

## 2020-11-20 PROCEDURE — 72125 CT NECK SPINE W/O DYE: CPT

## 2020-11-20 PROCEDURE — 73552 X-RAY EXAM OF FEMUR 2/>: CPT

## 2020-11-20 PROCEDURE — 84443 ASSAY THYROID STIM HORMONE: CPT

## 2020-11-20 PROCEDURE — 73090 X-RAY EXAM OF FOREARM: CPT

## 2020-11-20 PROCEDURE — 73060 X-RAY EXAM OF HUMERUS: CPT

## 2020-11-20 PROCEDURE — 82607 VITAMIN B-12: CPT

## 2020-11-20 PROCEDURE — 70450 CT HEAD/BRAIN W/O DYE: CPT

## 2020-11-20 PROCEDURE — 83735 ASSAY OF MAGNESIUM: CPT

## 2020-11-20 PROCEDURE — 82746 ASSAY OF FOLIC ACID SERUM: CPT

## 2020-11-20 PROCEDURE — 99285 EMERGENCY DEPT VISIT HI MDM: CPT | Mod: 25

## 2020-11-20 PROCEDURE — 71045 X-RAY EXAM CHEST 1 VIEW: CPT

## 2020-11-20 PROCEDURE — 97110 THERAPEUTIC EXERCISES: CPT

## 2020-11-20 PROCEDURE — 80048 BASIC METABOLIC PNL TOTAL CA: CPT

## 2020-11-20 PROCEDURE — 73030 X-RAY EXAM OF SHOULDER: CPT

## 2020-11-20 PROCEDURE — 97162 PT EVAL MOD COMPLEX 30 MIN: CPT

## 2020-11-20 PROCEDURE — 85025 COMPLETE CBC W/AUTO DIFF WBC: CPT

## 2020-11-20 PROCEDURE — 73562 X-RAY EXAM OF KNEE 3: CPT

## 2020-11-20 RX ORDER — VALSARTAN 80 MG/1
160 TABLET ORAL DAILY
Refills: 0 | Status: DISCONTINUED | OUTPATIENT
Start: 2020-11-20 | End: 2020-11-20

## 2020-11-20 RX ADMIN — VALSARTAN 160 MILLIGRAM(S): 80 TABLET ORAL at 10:48

## 2020-11-20 RX ADMIN — OXYCODONE HYDROCHLORIDE 2.5 MILLIGRAM(S): 5 TABLET ORAL at 04:17

## 2020-11-20 RX ADMIN — OXYCODONE HYDROCHLORIDE 2.5 MILLIGRAM(S): 5 TABLET ORAL at 03:20

## 2020-11-20 RX ADMIN — PANTOPRAZOLE SODIUM 40 MILLIGRAM(S): 20 TABLET, DELAYED RELEASE ORAL at 05:18

## 2020-11-20 RX ADMIN — Medication 1 TABLET(S): at 11:08

## 2020-11-20 NOTE — PROGRESS NOTE ADULT - SUBJECTIVE AND OBJECTIVE BOX
Patient is a 89y old  Female who presents with a chief complaint of s/p fall (20 Nov 2020 10:00)      SUBJECTIVE / OVERNIGHT EVENTS:  No chest pain. No shortness of breath. No complaints. No events overnight.     MEDICATIONS  (STANDING):  heparin   Injectable 5000 Unit(s) SubCutaneous every 8 hours  multivitamin 1 Tablet(s) Oral daily  pantoprazole    Tablet 40 milliGRAM(s) Oral before breakfast  senna 2 Tablet(s) Oral at bedtime  valsartan 160 milliGRAM(s) Oral daily    MEDICATIONS  (PRN):  acetaminophen   Tablet .. 650 milliGRAM(s) Oral every 6 hours PRN Temp greater or equal to 38C (100.4F), Mild Pain (1 - 3)  oxyCODONE    Solution 2.5 milliGRAM(s) Oral every 6 hours PRN Moderate Pain (4 - 6)      Vital Signs Last 24 Hrs  T(C): 36.9 (20 Nov 2020 12:02), Max: 36.9 (20 Nov 2020 12:02)  T(F): 98.4 (20 Nov 2020 12:02), Max: 98.4 (20 Nov 2020 12:02)  HR: 69 (20 Nov 2020 12:02) (61 - 76)  BP: 145/61 (20 Nov 2020 12:02) (145/61 - 184/80)  BP(mean): --  RR: 18 (20 Nov 2020 12:02) (18 - 18)  SpO2: 93% (20 Nov 2020 12:02) (91% - 94%)  CAPILLARY BLOOD GLUCOSE        I&O's Summary    19 Nov 2020 07:01  -  20 Nov 2020 07:00  --------------------------------------------------------  IN: 280 mL / OUT: 50 mL / NET: 230 mL        PHYSICAL EXAM:  GENERAL: NAD, well-developed  HEAD:  Atraumatic, Normocephalic  EYES: EOMI, PERRLA, conjunctiva and sclera clear  NECK: Supple, No JVD  CHEST/LUNG: Clear to auscultation bilaterally; No wheeze  HEART: Regular rate and rhythm; No murmurs, rubs, or gallops  ABDOMEN: Soft, Nontender, Nondistended; Bowel sounds present  EXTREMITIES:  2+ Peripheral Pulses, No clubbing, cyanosis, or edema  PSYCH: AAOx1 to 2  NEUROLOGY: non-focal  SKIN: No rashes or lesions    LABS:                    RADIOLOGY & ADDITIONAL TESTS:    Imaging Personally Reviewed:    Consultant(s) Notes Reviewed:      Care Discussed with Consultants/Other Providers:

## 2020-11-20 NOTE — DIETITIAN INITIAL EVALUATION ADULT. - PATIENT MEETS CRITERIA FOR MALNUTRITION
PRIMARY CARE VISIT    Patient name : Bran Galvan   MRN number: [id number]   YOB: 1970       Reason for visit:   Chief Complaint   Patient presents with   • Follow-up          Quality  Adult Wellness CI height documented, discussion of regular exercise, exercising regularly, printed information given for activities, discussion of nutritional quality of diet, patient education given about proper diet, alcohol use, considered quitting drinking, not getting angry when talked to about drinking, not having a drink or two in the morning to get going, not drinking alcohol regularly, and feeling guilty about it, colonoscopy performed: 10/06/2015, colonoscopy normal, no tobacco use, did not provide intervention and counseling in regards to tobacco use, mammogram performed: 5/07/2017, pap smear performed: 07/11/2017, does not have feelings of hopelessness (PHQ-2), no Anhedonia (PHQ-2), not referred to local mental health center, not taking medication for depression, no monitoring patient, preventive medicine therapy for influenza, no preventive medicine therapy for pneumococcal, pain scale level reviewed, has not fallen within the last 12 months, able to walk, no surrogate decision maker documented, taking aspirin, discussion of risks and benefits of Aspirin and no medications withdrawn because of contraindication.  and Diabetes CI height documented, recent medical exam by an Ophthalmologist: 01/05/2017, no tobacco use, did not provide intervention and counseling in regards to tobacco use, does not have feelings of hopelessness (PHQ-2), no anhedonia (PHQ-2), not referred to [facility], not taking medication for depression, no monitoring patient, a foot inspection performed, right foot was examined - Pedal Pulse, left foot was examined - Pedal Pulse, Monofilament Wire Test of the right foot was performed, Monofilament Wire Test of the left foot was performed, preventative medicine test  results documented/reviewed for Hemoglobin A1c, preventive medicine results documented/reviewed for Microalbuminuria and ACE inhibitor therapy prescribed. (pt BS was 133 per pt)        History of Present Illness     47 yo m here for FU visit and annual physical  No acute complaints      Review of Systems       Review of Systems   Constitutional: Negative for chills, fatigue and fever.   HENT: Negative for congestion, ear discharge, ear pain, postnasal drip, rhinorrhea, sinus pressure, sneezing, sore throat and tinnitus.    Eyes: Negative for discharge and redness.   Respiratory: Negative for cough, chest tightness, shortness of breath and wheezing.    Cardiovascular: Negative for chest pain, palpitations and leg swelling.   Gastrointestinal: Negative for abdominal pain, blood in stool, constipation, diarrhea, nausea and vomiting.   Endocrine: Negative for polydipsia.   Genitourinary: Negative for dysuria, flank pain, hematuria and urgency.   Musculoskeletal: Negative for arthralgias, back pain, joint swelling, myalgias, neck pain and neck stiffness.   Skin: Negative for rash.   Allergic/Immunologic: Negative for food allergies.   Neurological: Negative for dizziness, seizures, syncope, weakness, numbness and headaches.   Hematological: Negative for adenopathy.   Psychiatric/Behavioral: Negative for hallucinations. The patient is not nervous/anxious.         Allergies  ALLERGIES:  No Known Allergies    Current Meds    Current Outpatient Medications   Medication Sig Dispense Refill   • amlodipine-benazepril (LOTREL) 5-20 MG per capsule Take 1 capsule by mouth daily. 30 capsule 3     No current facility-administered medications for this visit.            Past Medical History  History reviewed. No pertinent past medical history.    Surgical History  History reviewed. No pertinent surgical history.    Family History  History reviewed. No pertinent family history.    Social History  Social History     Tobacco Use   •  Smoking status: Never Smoker   • Smokeless tobacco: Never Used   Substance Use Topics   • Alcohol use: Yes     Comment: socially    • Drug use: No        Review    Bran has no past medical history on file.  Bran has Essential (primary) hypertension; Annual physical exam; and Bilateral impacted cerumen on their problem list.  Bran has no past surgical history on file.  His family history is not on file.  Bran reports that  has never smoked. he has never used smokeless tobacco. He reports that he drinks alcohol. He reports that he does not use drugs.  Bran has a current medication list which includes the following prescription(s): amlodipine-benazepril.  Bran   Current Outpatient Medications   Medication Sig Dispense Refill   • amlodipine-benazepril (LOTREL) 5-20 MG per capsule Take 1 capsule by mouth daily. 30 capsule 3     No current facility-administered medications for this visit.         Vitals    Visit Vitals  /80   Pulse 82   Temp 97.6 °F (36.4 °C)   Resp 20   Ht 5' 10\" (1.778 m)   Wt 80 kg (176 lb 5.9 oz)   SpO2 97%   BMI 25.31 kg/m²       Physical Exam   Constitutional: He is oriented to person, place, and time. He appears well-developed and well-nourished.   HENT:   Head: Normocephalic.   Mouth/Throat: Oropharynx is clear and moist.   Bilateral cerumen impaction   Eyes: Conjunctivae and EOM are normal. Pupils are equal, round, and reactive to light.   Neck: Normal range of motion. Neck supple. No tracheal deviation present.   Cardiovascular: Normal rate, regular rhythm, normal heart sounds and intact distal pulses.   Pulmonary/Chest: Effort normal and breath sounds normal. He has no wheezes.   Abdominal: Soft. Bowel sounds are normal. There is no tenderness.   Musculoskeletal: Normal range of motion. He exhibits no edema.   Neurological: He is alert and oriented to person, place, and time.   Skin: Skin is warm and dry.   Psychiatric: He has a normal mood and affect. His behavior  is normal.        Results/Data  No visits with results within 4 Week(s) from this visit.   Latest known visit with results is:   Lab Services on 01/27/2018   Component Date Value Ref Range Status   • COLOR 01/27/2018 YELLOW  YELLOW   Final   • APPEARANCE 01/27/2018 CLEAR    Final   • GLUCOSE(URINE) 01/27/2018 NEGATIVE  NEGATIVE mg/dl Final   • BILIRUBIN 01/27/2018 NEGATIVE  NEGATIVE   Final   • KETONES 01/27/2018 NEGATIVE  NEGATIVE mg/dl Final   • SPECIFIC GRAVITY 01/27/2018 1.019  1.005 - 1.030   Final   • RBC-URINE 01/27/2018 NEGATIVE  NEGATIVE   Final   • pH 01/27/2018 6.0  5.0 - 7.0 Units Final   • PROTEIN(URINE) 01/27/2018 30* NEGATIVE mg/dl Final   • UROBILINOGEN 01/27/2018 0.2  0.0 - 1.0 mg/dl Final   • NITRITE 01/27/2018 NEGATIVE  NEGATIVE   Final   • WBC-URINE 01/27/2018 NEGATIVE  NEGATIVE   Final   • SPECIMEN TYPE 01/27/2018 URINE, CLEAN CATCH/MIDSTREAM    Final   • Squamous EPI'S 01/27/2018 NONE SEEN  0 - 5 /HPF Final   • RBC 01/27/2018 1 to 3  0 - 3 /HPF Final   • WBC 01/27/2018 NONE SEEN  0 - 5 /HPF Final   • BACTERIA 01/27/2018 NONE SEEN  NONE SEEN /HPF Final   • Hyaline Casts 01/27/2018 NONE SEEN  0 - 5 /LPF Final   • MUCOUS 01/27/2018 PRESENT    Final   • Testosterone Male 01/27/2018 494  280.0 - 1,100.0 ng/dl Final    ADULT MALE REFERENCE RANGE   • PSA, Total 01/27/2018 1.73  <4.01 ng/ml Final    Comment:    SUGGESTED AGE SPECIFIC REFERENCE RANGES     AGE                NG/ML  40-49              0.01-2.50  50-59              0.01-3.50  60-69              0.01-4.50  70-79              0.01-6.50     REFERENCE: JOURNAL OF UROLOGY 155, [id number]     [employer] Chemiluminescence     • WHITE BLOOD COUNT 01/27/2018 4.5  4.2 - 11.0 K/mcL Final   • RED CELL COUNT 01/27/2018 4.63  4.50 - 5.90 mil/mcL Final   • HEMOGLOBIN 01/27/2018 14.5  13.0 - 17.0 g/dl Final   • HEMATOCRIT 01/27/2018 44.2  39.0 - 51.0 % Final   • MEAN CORPUSCULAR VOLUME 01/27/2018 95.5  78.0 - 100.0 fL Final   • MEAN CORPUSCULAR  HEMOGLOBIN 01/27/2018 31.3  26.0 - 34.0 pg Final   • MEAN CORPUSCULAR HGB CONC 01/27/2018 32.8  32.0 - 36.5 g/dl Final   • RDW-CV 01/27/2018 13.2  11.0 - 15.0 % Final   • PLATELET COUNT 01/27/2018 280  140 - 450 K/mcL Final   • Neutrophil 01/27/2018 43  % Final   • LYMPH 01/27/2018 38  % Final   • MONO 01/27/2018 14  % Final   • EOSIN 01/27/2018 5  % Final   • BASO 01/27/2018 0  % Final   • Absolute Neutrophil 01/27/2018 1.9  1.8 - 7.7 K/mcL Final   • Absolute Lymph 01/27/2018 1.7  1.0 - 4.8 K/mcL Final   • Absolute Mono 01/27/2018 0.6  0.3 - 0.9 K/mcL Final   • Absolute Eos 01/27/2018 0.2  0.1 - 0.5 K/mcL Final   • Absolute Baso 01/27/2018 0  0.0 - 0.3 K/mcL Final   • Sodium 01/27/2018 139  135 - 145 mmol/L Final   • Potassium 01/27/2018 4.7  3.4 - 5.1 mmol/L Final   • Chloride 01/27/2018 102  98 - 107 mmol/L Final   • Carbon Dioxide 01/27/2018 28  21 - 32 mmol/L Final   • Anion Gap 01/27/2018 14  10 - 20 mmol/L Final   • Glucose 01/27/2018 79  65 - 99 mg/dl Final   • BUN 01/27/2018 18  6 - 20 mg/dl Final   • Creatinine 01/27/2018 1.43* 0.67 - 1.17 mg/dl Final   • GFR Estimate,  01/27/2018 67    Final    eGFR 60 - 89 mL/min/1.73m2 = Mild decrease in kidney function.   • GFR Estimate, Non  01/27/2018 58    Final    eGFR 30-59 mL/min/1.73m2 = Moderate decrease in kidney function. Stage 3 CKD (chronic kidney disease) or moderate kidney disease.   • BUN/Creatinine Ratio 01/27/2018 13  7 - 25   Final   • TOTAL BILIRUBIN 01/27/2018 0.4  0.2 - 1.0 mg/dl Final   • AST/SGOT 01/27/2018 23  <38 Units/L Final   • ALK PHOSPHATASE 01/27/2018 130* 45 - 117 Units/L Final   • Albumin 01/27/2018 3.7  3.6 - 5.1 g/dl Final   • TOTAL PROTEIN 01/27/2018 8.0  6.4 - 8.2 g/dl Final   • GLOBULIN 01/27/2018 4.3* 2.0 - 4.0 g/dl Final   • A/G Ratio, Serum 01/27/2018 0.9* 1.0 - 2.4   Final   • CALCIUM 01/27/2018 9.3  8.4 - 10.2 mg/dl Final   • ALT/SGPT 01/27/2018 23  <79 Units/L Final   • FASTING STATUS  01/27/2018 UNKNOWN  hrs Final   • CHOLESTEROL 01/27/2018 150  <200 mg/dl Final    Comment:    Desirable            <200  Borderline High      200 to 239  High                 >=240        • HDL 01/27/2018 42  >39 mg/dl Final    Comment: Low            <40  Borderline Low 40 to 49  Near Optimal   50 to 59  Optimal        >=60     • TRIGLYCERIDE 01/27/2018 55  <150 mg/dl Final    Comment: Normal                   <150  Borderline High          150 to 199  High                     200 to 499  Very High                >=500     • CALCULATED LDL 01/27/2018 97  <130 mg/dl Final    Comment: OPTIMAL               <100  NEAR OPTIMAL          100-129  BORDERLINE HIGH       130-159  HIGH                  160-189  VERY HIGH             >=190     • CALCULATED NON HDL 01/27/2018 108  mg/dl Final    Comment:    Therapeutic Target:  CHD and risk equivalents <130  Multiple risk factors    <160  0 to 1 risk factors      <190        • CHOL/HDL 01/27/2018 3.6  <4.5   Final   • TSH 01/27/2018 0.614  0.350 - 5.000 mcUnits/mL Final   • DIFF TYPE 01/27/2018 AUTOMATED DIFFERENTIAL    Final   • Fasting Status 01/27/2018 UNKNOWN  hrs Final   • VITAMIND, 25 HYDROXY 01/27/2018 50.8  30.0 - 100.0 ng/ml Final    Comment: <20  ng/mL=Vitamin D deficiency  20-29  ng/mL=Vitamin D insufficiency   ng/mL=Optimal Vitamin D  >150 ng/mL=Possible toxicity         Assessment/ PLAN    Essential (primary) hypertension    Annual physical exam  - CBC & AUTO DIFFERENTIAL; Future  - COMPREHENSIVE METABOLIC PANEL; Future  - LIPID PANEL WITH REFLEX; Future  - TESTOSTERONE TOTAL-MALE; Future  - THYROID STIMULATING HORMONE; Future  - PROSTATE SPECIFIC ANTIGEN; Future  - URINALYSIS & REFLEX MICRO; Future    Bilateral impacted cerumen    Plan    Labs ordered    Nurse ear wash    Colonoscopy class         RETURN TO OFFICE  Return in about 6 months (around 7/16/2019).        Shelly Haile MD   yes

## 2020-11-20 NOTE — DIETITIAN INITIAL EVALUATION ADULT. - ORAL INTAKE PTA/DIET HISTORY
RD unable to obtain intake Hx as pt confused. No information available regarding intake and diet at assisted living. Per previous RD note on 2/27 pt c Hx of poor PO intake. Unknown whether pt following therapeutic diet. Noted with allergy to walnuts c unknown reaction. Pt seen for MBS on 3/2/2020 with recommendation from SLP, "Soft diet with thin liquids, food cut up into small pieces, single sips only." Unknown if pt currently c chewing or swallowing issues. Per H&P, pt took multivitamin.

## 2020-11-20 NOTE — DIETITIAN INITIAL EVALUATION ADULT. - COLLABORATION WITH OTHER PROVIDERS
2) Recommend speech and swallow evaluation in setting of Hx swallowing issues. 3) Malnutrition and BMI <19 kg/m2 placed, provider aware.

## 2020-11-20 NOTE — PROGRESS NOTE ADULT - ASSESSMENT
89 y.o. female hx of multiple falls presents from [facility] assisted living s/p unwitnessed nonsyncopal fall. Patient stated she was wearing slippers, getting dressed and then fell onto her left side. Denies hitting her head. Was able to get up with assitance after. Normally ambulates without assitance. EMS found patient hypoxi c to 90%, warmed her extremities and placed her on2L and she improved to 100%. Patient had abrasions on left forearm and left knee. Endorsing pain to left arm and left knee. Denies chest pain, shortness of breath, headache, visual changes, nausea, vomiting.    s/p fall with rib fx  - PT eval  - pain control     HTN  - c/w home meds    GERD  - protonic    DVT px  - sq heparin    dispo  - TIFFANIE today

## 2020-11-20 NOTE — DIETITIAN INITIAL EVALUATION ADULT. - CONTINUE CURRENT NUTRITION CARE PLAN
yes/1) Continue current Regular diet without therapeutic restrictions to optimize protein-energy intake. Consistency deferred to SLP and provider.

## 2020-11-20 NOTE — DIETITIAN INITIAL EVALUATION ADULT. - OTHER INFO
Dosing wt: 98.1 lbs. Daily wt in lbs: 86.4 (11/18), 98.1 (11/17). Per previous RD note, pt c UBW of 106 lbs (2/27/2020). Current wt indicated a 19.6 lb wt loss x 9 months. Unknown intentional vs unintentional; likely unintentional in setting of advanced age c previous report of poor appetite and intake.     Per RN, pt is currently eating well, taking most of meals. No known intolerance to Regular food texture. No known N/V, diarrhea, or constipation. Last BM 11/19. Pt previously took Ensure Enlive x2 daily, recommend continue this admission.

## 2020-11-20 NOTE — DIETITIAN INITIAL EVALUATION ADULT. - REASON INDICATOR FOR ASSESSMENT
Pt seen for BMI <19 kg/m2  Source: Medical record and RN (pt asleep and confused, RN asked RD not to wake pt due to confusion)

## 2020-11-20 NOTE — CHART NOTE - TREATMENT: THE FOLLOWING DIET HAS BEEN RECOMMENDED
Diet, Regular:   Supplement Feeding Modality:  Oral  Ensure Enlive Cans or Servings Per Day:  2       Frequency:  Daily (11-20-20 @ 11:10) [Pending Verification By Attending]  Diet, Regular (11-17-20 @ 14:33) [Active]

## 2020-11-20 NOTE — PROVIDER CONTACT NOTE (OTHER) - ACTION/TREATMENT ORDERED:
as per NP enhanced supervision, no medication b/c pt 89 yo
continue to monitor BP
continue to monitor BP
recheck BP; continue to monitor pt.

## 2020-11-20 NOTE — DIETITIAN INITIAL EVALUATION ADULT. - LAB (SPECIFY)
Continue to trend nutrition related labs. Monitor K, Phos, and Mg for refeeding given significant wt loss and low BMI.

## 2020-11-20 NOTE — DIETITIAN INITIAL EVALUATION ADULT. - ENTER FROM (ML/KG)
Reviewed record in preparation for visit and have obtained necessary documentation. Identified pt with two pt identifiers(name and ). Chief Complaint   Patient presents with    Hypertension    Thyroid Problem    Cholesterol Problem       Health Maintenance Due   Topic Date Due    Hepatitis C Test  1951    DTaP/Tdap/Td  (1 - Tdap) 1972    Stool testing for trace blood  2001    Shingles Vaccine  2011    Glaucoma Screening   2016    Pneumococcal Vaccine (1 of 2 - PCV13) 2016       Mr. Pena has a reminder for a \"due or due soon\" health maintenance. I have asked that he discuss this further with his primary care provider for follow-up on this health maintenance. Coordination of Care Questionnaire:  :     1) Have you been to an emergency room, urgent care clinic since your last visit? Hospitalized since your last visit? 2) Have you seen or consulted any other health care providers outside of [address] [place] [address] since your last visit? (Include any pap smears or colon screenings in this section.)    3) In the event something were to happen to you and you were unable to speak on your behalf, do you have an Advance Directive/ Living Will in place stating your wishes? Do you have an Advance Directive on file? 4) Are you interested in receiving information on Advance Directives? Patient is accompanied by I have received verbal consent from [address] [place] [address] to discuss any/all medical information while they are present in the room.
Name band;
32

## 2020-11-20 NOTE — DISCHARGE NOTE NURSING/CASE MANAGEMENT/SOCIAL WORK - PATIENT PORTAL LINK FT
You can access the FollowMyHealth Patient Portal offered by [facility] by registering at the following website: [email or web address]. By joining [employer]’s FollowMyHealth portal, you will also be able to view your health information using other applications (apps) compatible with our system.

## 2021-02-01 NOTE — PROVIDER CONTACT NOTE (OTHER) - ASSESSMENT
pt asymptomic for htn; pt resting comfortably in bed
pt asymptomatic for htn; ot resting comfortably in bed at this time
pt asymptomatic for htn; pt resting comfortably in bed
Orbicularis Oris Muscle Flap Text: The defect edges were debeveled with a #15 scalpel blade.  Given that the defect affected the competency of the oral sphincter an obicularis oris muscle flap was deemed most appropriate to restore this competency and normal muscle function.  Using a sterile surgical marker, an appropriate flap was drawn incorporating the defect. The area thus outlined was incised with a #15 scalpel blade.

## 2021-02-15 NOTE — PATIENT PROFILE ADULT. - FALL HARM RISK TYPE OF ASSESSMENT
H&P- Lucero Scruggs 1975, 39 y o  male MRN: [id number]    Unit/Bed#: -01 Encounter: [id number]    Primary Care Provider: Alyson Fuentes DO   Date and time admitted to hospital: 2/14/2021  2:30 PM        * Hypertensive urgency  Assessment & Plan  · Patient presented to ED for left shoulder pain and radiation to chest  · Incidentally noted elevated blood pressure initially 222/124  · In setting of noncompliance as patient reports he has not been taking his home medications  · Despite multiple IV hypertension medications no real change in blood pressure from over 200s over 100s  · Seen by critical care for evaluation suggesting restarting home medications and rechecking blood pressure 2 hours later  · CT head negative  · Chest x-ray revealing AICD in place  · EKG x2 revealing normal sinus rhythm, prolonged QTC  · After receiving p o  Coreg 25 mg blood pressure did decrease a little bit to 188/98  · Upon approach remaining in 190s over 100  · Patient also given p o  Nifedipine 90 mg and p o  Hydralazine 75 mg in the ED  · Will continue to closely monitor blood pressure q 1 hour x6 hours then q 2 hours for now  · Will resume home meds including Coreg 25 mg p o  B i d , hydralazine 75 mg p o  Q 8 hours, nifedipine 90 mg p o  Q d  · Educated on importance of compliance with medication outpatient  · P r n  IV labetalol 10 mg q 6 hours also added with parameters  · No sign of end-organ damage at this time, patient without complaints, heart rate stable  · Case management consult for help with medication for discharge    Hypocalcemia  Assessment & Plan  · On BMP 6 6, ionized calcium 0 74  · Chronic issue and patient does report noncompliance with medication at home  · Two doses IV gluconate 1 g given in ED along with 1 dose IV magnesium  · Recheck BMP for a m  · Continue home calcitriol 1 g q d    · Appreciate nephrology consult for dialysis    Chest pain  Assessment & Plan  · Initially on arrival complaining of left shoulder pain radiation with chest pain as well  · Upon evaluation patient denies any chest pain reports that did resolve quickly upon arrival several hours ago  · SUMMER 1  · Initial troponin negative  · Trend troponin 2 more times with EKG  · Initial EKGs revealing normal sinus rhythm, prolonged QTC  · Monitor on telemetry  · If any elevation troponin or onset of new chest pain consider cardiology consult    QT prolongation  Assessment & Plan  · QT initially 526, decreased slightly and upon current recheck increased to 550  · Continue to monitor EKGs closely  · Patient given 1 dose IV magnesium 2 g in the ED  · Avoid QT prolonging agents such as Zofran  · Monitor electrolytes    Shoulder pain  Assessment & Plan  · Initial report of left shoulder pain which has since resolved status post trigger point injection  · Follow-up outpatient with PCP    Anemia due to chronic kidney disease, on chronic dialysis [facility])  Assessment & Plan  · Lab Results   Component Value Date    EGFR 3 02/14/2021    EGFR 4 01/27/2021    EGFR 4 01/11/2021    CREATININE 17 86 (H) 02/14/2021    CREATININE 14 13 (H) 01/27/2021    CREATININE 13 71 (H) 01/11/2021   · Hemoglobin currently 9 7  · Does appear slightly above baseline which is around 8-9  · No active bleeding  · Monitor CBC    End-stage renal disease on hemodialysis  Assessment & Plan  Lab Results   Component Value Date    EGFR 3 02/14/2021    EGFR 4 01/27/2021    EGFR 4 01/11/2021    CREATININE 17 86 (H) 02/14/2021    CREATININE 14 13 (H) 01/27/2021    CREATININE 13 71 (H) 01/11/2021   · Creatinine currently 17 86,   · Baseline per review chart around 14-15  · Last dialysis session on Friday, due for dialysis today 2/15  · Appreciate nephrology consult for dialysis  · Monitor BMP    Acute congestive heart failure [facility])  Assessment & Plan  Wt Readings from Last 3 Encounters:   02/14/21 107 kg (236 lb 15 9 oz)   01/26/21 99 8 kg (220 lb 0 3 oz)   01/11/21 99 8 kg (220 lb) · No signs of fluid overload at this time  · Cardiac diet  · Continue home Coreg              VTE Prophylaxis: Heparin  / sequential compression device   Code Status:  Level 1, full code  POLST: POLST form is not discussed and not completed at this time  Anticipated Length of Stay:  Patient will be admitted on an Observation basis with an anticipated length of stay of  less than 2 midnights  Justification for Hospital Stay:  See above    Total Time for Visit, including Counseling / Coordination of Care: 1 hour  Greater than 50% of this total time spent on direct patient counseling and coordination of care  Chief Complaint:      Chief Complaint   Patient presents with    Weakness - Generalized     Patient present to ED with co generalized weakness that started approximately 1 month ago  Patient states "I have pain in my shoulder that goes down my arm into my chest "        History of Present Illness:    Maksim Royal is a 39 y o  male PMH not limited to hypertension, end-stage renal disease on hemodialysis, history of ACS, history of hypocalcemia status post parathyroidectomy who presents with complaint of left shoulder pain and chest pain  During conversation he reports both pains or currently resolved with the help of a trigger point injection in the ED reports chest pain quickly resolved after arrival in the ED  Also noted incidentally to have elevated blood pressure and hypocalcemia and patient reports he has not been taking his home medications  Currently denies any symptoms at all including headaches or dizziness or chest pain  Did educate patient on compliance with outpatient medications due to hypertensive urgency  Patient constricted during conversation and would mostly not yes or no to questions  Review of Systems:    Review of Systems   Constitutional: Negative for fatigue  Respiratory: Negative for shortness of breath      Cardiovascular: Negative for palpitations and leg swelling  Chest pain initially on arrival which has resolved   Gastrointestinal: Negative for abdominal pain  Musculoskeletal:        Initially in the ED per ED provider know admitted to left shoulder pain which has now resolved during conversation   Neurological: Negative for dizziness and headaches  Past Medical and Surgical History:     Past Medical History:   Diagnosis Date    Chronic kidney disease     Hypertension     Pressure injury of skin     Renal disorder     dialysis        Past Surgical History:   Procedure Laterality Date    CARDIAC DEFIBRILLATOR PLACEMENT      IR AV FISTULAGRAM/GRAFTOGRAM  1/18/2019    IR TUNNELED CENTRAL LINE PLACEMENT  10/29/2020    [place] EXPLORE PARATHYROID GLANDS N/A 10/22/2020    Procedure: Three gland PARATHYROIDECTOMY, four gland exploration, intraoperative PTH monitoring;  Surgeon: Alexys Davis MD;  [facility];  Service: Surgical Oncology    SPLIT THICKNESS SKIN GRAFT Left 12/2/2020    Procedure: SKIN GRAFT SPLIT THICKNESS (STSG)  LEFT ARM;  Surgeon: Miranda Russ MD;  [facility];  Service: Plastics    WOUND DEBRIDEMENT Left 12/2/2020    Procedure: DEBRIDEMENT OF LEFT ARM WOUND;  Surgeon: Miranda Russ MD;  [facility];  Service: Plastics       Meds/Allergies:    Prior to Admission medications    Medication Sig Start Date End Date Taking? Authorizing Provider   bacitracin-polymyxin b (POLYSPORIN) ointment Apply to xeroform and then apply to skin graft on left arm 12/15/20 12/15/21 Yes Miranda Russ MD   Bismuth Tribromoph-Petrolatum (Xeroform Petrolat Gauze 5"x9") MISC Apply 1 each topically daily Cut small piece, apply bacitracin ointment to small piece and then apply dressing to left arm skin graft site  Can save remainder of xeroform to use   12/15/20  Yes Miranda Russ MD   calcitriol (ROCALTROL) 0 5 MCG capsule Take 2 capsules (1 mcg total) by mouth daily 10/30/20  Yes Davide Lomeli PA-C   diazepam (Valium) 5 mg tablet Take 1 tablet (5 mg total) by mouth every 8 (eight) hours as needed for muscle spasms 1/26/21  Yes Earlene Chaudhry PA-C   lidocaine (LIDODERM) 5 % Apply 1 patch topically daily Remove & Discard patch within 12 hours or as directed by MD 1/27/21  Yes Ana Curtis DO   NIFEdipine (PROCARDIA XL) 90 mg 24 hr tablet Take 90 mg by mouth daily 5/5/20 5/5/21 Yes Historical Provider, MD   carvedilol (COREG) 25 mg tablet Take 1 tablet (25 mg total) by mouth 2 (two) times a day with meals 7/6/20 11/25/20  Annie Garcia MD   hydrALAZINE (APRESOLINE) 25 mg tablet Take 3 tablets (75 mg total) by mouth every 8 (eight) hours 7/6/20 11/25/20  Annie Garcia MD     I have reviewed home medications per review of chart and PDMP    Allergies: No Known Allergies    Social History:     Marital Status: /Civil Union   Patient Pre-hospital Level of Mobility:  Independent  Patient Pre-hospital Diet Restrictions: n/a  Substance Use History:   Social History     Substance and Sexual Activity   Alcohol Use Not Currently    Frequency: Never     Social History     Tobacco Use   Smoking Status Former Smoker    Quit date: 6/15/2010    Years since quitting: 10 6   Smokeless Tobacco Never Used     Social History     Substance and Sexual Activity   Drug Use Never       Family History:    History reviewed  No pertinent family history  Physical Exam:     Vitals:   Blood Pressure: (!) 209/124 (02/15/21 0146)  Pulse: 78 (02/15/21 0115)  Temperature: 98 4 °F (36 9 °C) (02/15/21 0146)  Temp Source: Oral (02/14/21 1435)  Respirations: 15 (02/15/21 0115)  Height: 5' 10" (177 8 cm) (02/14/21 1435)  Weight - Scale: 107 kg (236 lb 15 9 oz) (02/14/21 1435)  SpO2: 97 % (02/15/21 0115)    Physical Exam  Vitals signs and nursing note reviewed  Constitutional:       General: He is not in acute distress  Appearance: Normal appearance  He is not toxic-appearing  Comments: Constricted in affect    Mostly nodding yes or no to questions appears guarded  HENT:      Head: Normocephalic  Eyes:      Conjunctiva/sclera: Conjunctivae normal    Cardiovascular:      Rate and Rhythm: Normal rate and regular rhythm  Heart sounds: Normal heart sounds  Pulmonary:      Effort: Pulmonary effort is normal  No respiratory distress  Breath sounds: Normal breath sounds  No stridor  No wheezing or rales  Abdominal:      General: Abdomen is flat  Bowel sounds are normal  There is no distension  Palpations: Abdomen is soft  Tenderness: There is no abdominal tenderness  There is no guarding  Musculoskeletal:         General: No tenderness  Right lower leg: No edema  Left lower leg: No edema  Skin:     General: Skin is warm and dry  Coloration: Skin is not pale  Findings: No erythema  Neurological:      Mental Status: He is alert  Mental status is at baseline  Psychiatric:         Behavior: Behavior normal       Comments: Constricted affect  Guarded  Additional Data:     Lab Results: I have personally reviewed pertinent reports  Results from last 7 days   Lab Units 02/14/21  1454   WBC Thousand/uL 4 58   HEMOGLOBIN g/dL 9 7*   HEMATOCRIT % 31 2*   PLATELETS Thousands/uL 244   NEUTROS PCT % 58   LYMPHS PCT % 29   MONOS PCT % 9   EOS PCT % 3     Results from last 7 days   Lab Units 02/14/21  1454   SODIUM mmol/L 137   POTASSIUM mmol/L 5 2   CHLORIDE mmol/L 94*   CO2 mmol/L 22   BUN mg/dL 113*   CREATININE mg/dL 17 86*   ANION GAP mmol/L 21*   CALCIUM mg/dL 6 6*   GLUCOSE RANDOM mg/dL 114                       Imaging: I have personally reviewed pertinent reports  and I have personally reviewed pertinent films in PACS    CT head without contrast   Final Result by Wilmer Jorge MD (02/14 2234)      No acute intracranial hemorrhage, midline shift, or mass effect                    Workstation performed: [id number]         XR chest 1 view portable    (Results Pending)       EKG, Pathology, and Other Studies Reviewed on Admission:   · EKG:  Normal sinus rhythm, prolonged QTC    Allscripts / Epic Records Reviewed: Yes     ** Please Note: This note has been constructed using a voice recognition system   ** Transfer to Different Level of Care

## 2021-04-10 ENCOUNTER — EMERGENCY (EMERGENCY)
Facility: HOSPITAL | Age: 86
LOS: 1 days | Discharge: HOME | End: 2021-04-10
Attending: EMERGENCY MEDICINE
Payer: MEDICARE

## 2021-04-10 VITALS
OXYGEN SATURATION: 96 % | SYSTOLIC BLOOD PRESSURE: 116 MMHG | RESPIRATION RATE: 24 BRPM | TEMPERATURE: 98 F | HEART RATE: 85 BPM | HEIGHT: 65 IN | DIASTOLIC BLOOD PRESSURE: 65 MMHG | WEIGHT: 130.07 LBS

## 2021-04-10 DIAGNOSIS — J38.1 POLYP OF VOCAL CORD AND LARYNX: Chronic | ICD-10-CM

## 2021-04-10 LAB
APTT BLD: 27.6 SEC — SIGNIFICANT CHANGE UP (ref 27.5–35.5)
BASE EXCESS BLDV CALC-SCNC: 6.9 MMOL/L — HIGH (ref -2–2)
BASOPHILS # BLD AUTO: 0.05 K/UL — SIGNIFICANT CHANGE UP (ref 0–0.2)
BASOPHILS NFR BLD AUTO: 0.5 % — SIGNIFICANT CHANGE UP (ref 0–2)
CA-I SERPL-SCNC: 1.15 MMOL/L — SIGNIFICANT CHANGE UP (ref 1.12–1.3)
CHLORIDE BLDV-SCNC: 106 MMOL/L — SIGNIFICANT CHANGE UP (ref 96–108)
CO2 BLDV-SCNC: 33 MMOL/L — HIGH (ref 22–30)
EOSINOPHIL # BLD AUTO: 0.19 K/UL — SIGNIFICANT CHANGE UP (ref 0–0.5)
EOSINOPHIL NFR BLD AUTO: 2 % — SIGNIFICANT CHANGE UP (ref 0–6)
GAS PNL BLDV: 137 MMOL/L — SIGNIFICANT CHANGE UP (ref 135–145)
GAS PNL BLDV: SIGNIFICANT CHANGE UP
GAS PNL BLDV: SIGNIFICANT CHANGE UP
GLUCOSE BLDV-MCNC: 138 MG/DL — HIGH (ref 70–99)
HCO3 BLDV-SCNC: 32 MMOL/L — HIGH (ref 21–29)
HCT VFR BLD CALC: 37.5 % — SIGNIFICANT CHANGE UP (ref 34.5–45)
HCT VFR BLDA CALC: 38 % — LOW (ref 39–50)
HGB BLD CALC-MCNC: 12.4 G/DL — SIGNIFICANT CHANGE UP (ref 11.5–15.5)
HGB BLD-MCNC: 11.7 G/DL — SIGNIFICANT CHANGE UP (ref 11.5–15.5)
HOROWITZ INDEX BLDV+IHG-RTO: SIGNIFICANT CHANGE UP
IMM GRANULOCYTES NFR BLD AUTO: 0.2 % — SIGNIFICANT CHANGE UP (ref 0–1.5)
INR BLD: 1.02 RATIO — SIGNIFICANT CHANGE UP (ref 0.88–1.16)
LACTATE BLDV-MCNC: 1.4 MMOL/L — SIGNIFICANT CHANGE UP (ref 0.7–2)
LYMPHOCYTES # BLD AUTO: 2.1 K/UL — SIGNIFICANT CHANGE UP (ref 1–3.3)
LYMPHOCYTES # BLD AUTO: 22.6 % — SIGNIFICANT CHANGE UP (ref 13–44)
MCHC RBC-ENTMCNC: 28.2 PG — SIGNIFICANT CHANGE UP (ref 27–34)
MCHC RBC-ENTMCNC: 31.2 GM/DL — LOW (ref 32–36)
MCV RBC AUTO: 90.4 FL — SIGNIFICANT CHANGE UP (ref 80–100)
MONOCYTES # BLD AUTO: 0.59 K/UL — SIGNIFICANT CHANGE UP (ref 0–0.9)
MONOCYTES NFR BLD AUTO: 6.3 % — SIGNIFICANT CHANGE UP (ref 2–14)
NEUTROPHILS # BLD AUTO: 6.35 K/UL — SIGNIFICANT CHANGE UP (ref 1.8–7.4)
NEUTROPHILS NFR BLD AUTO: 68.4 % — SIGNIFICANT CHANGE UP (ref 43–77)
NRBC # BLD: 0 /100 WBCS — SIGNIFICANT CHANGE UP (ref 0–0)
OTHER CELLS CSF MANUAL: 6 ML/DL — LOW (ref 18–22)
PCO2 BLDV: 49 MMHG — HIGH (ref 39–42)
PH BLDV: 7.43 — SIGNIFICANT CHANGE UP (ref 7.35–7.45)
PLATELET # BLD AUTO: 315 K/UL — SIGNIFICANT CHANGE UP (ref 150–400)
PO2 BLDV: 23 MMHG — LOW (ref 25–45)
POTASSIUM BLDV-SCNC: 4.2 MMOL/L — SIGNIFICANT CHANGE UP (ref 3.5–5.3)
PROTHROM AB SERPL-ACNC: 12.2 SEC — SIGNIFICANT CHANGE UP (ref 10.6–13.6)
RBC # BLD: 4.15 M/UL — SIGNIFICANT CHANGE UP (ref 3.8–5.2)
RBC # FLD: 15.8 % — HIGH (ref 10.3–14.5)
SAO2 % BLDV: 37 % — LOW (ref 67–88)
WBC # BLD: 9.3 K/UL — SIGNIFICANT CHANGE UP (ref 3.8–10.5)
WBC # FLD AUTO: 9.3 K/UL — SIGNIFICANT CHANGE UP (ref 3.8–10.5)

## 2021-04-10 PROCEDURE — 71045 X-RAY EXAM CHEST 1 VIEW: CPT | Mod: 26

## 2021-04-10 PROCEDURE — 93010 ELECTROCARDIOGRAM REPORT: CPT

## 2021-04-10 PROCEDURE — 99285 EMERGENCY DEPT VISIT HI MDM: CPT | Mod: CS,GC

## 2021-04-10 NOTE — ED PROVIDER NOTE - PROGRESS NOTE DETAILS
ED Sign Out 7AM, pending repeat BMP after IVF/PO for eval for ARF ? to dehydration, close reassessments, d/w PCP/family for dispo decision / shared decision making. -- Johnson Qiu MD Luis Eduardo PGY2: Cr improved, will give more fluid and continue to trend Luis Eduardo PGY2: Cr continues to improve. Discussed return precautions and results with dirk Tripp. Cristian aware patient returning. Pending transpo.

## 2021-04-10 NOTE — ED ADULT NURSE NOTE - ED STAT RN HANDOFF DETAILS
Report given to RN Rozina. Aware of POC. No questions at this time. Report given to KAIA Mitchell. Aware of POC. No questions at this time.

## 2021-04-10 NOTE — ED ADULT TRIAGE NOTE - CHIEF COMPLAINT QUOTE
from the [facility] - paperwork states "anxiety and elevated hr 115" however EMS states staff told them the family believes she is dehydrated.

## 2021-04-10 NOTE — ED PROVIDER NOTE - PATIENT PORTAL LINK FT
You can access the FollowMyHealth Patient Portal offered by [facility] by registering at the following website: [email or web address]. By joining [employer]’s FollowMyHealth portal, you will also be able to view your health information using other applications (apps) compatible with our system.

## 2021-04-10 NOTE — ED PROVIDER NOTE - CLINICAL SUMMARY MEDICAL DECISION MAKING FREE TEXT BOX
Ye Guadalupe MD (PGY-1): The patient is a 89y Female with pmhx of HTN, PVD, and dementia BIBEMS from [facility] for dehydration and an episode of tachycardia with a heart rate of 115. DDx includes ACS, underlying infection, arrythmia. Ekg, cxr, trop, ctap, u/a, labs. Pt is not tachycardic right now, vitals wnl. If everything comes back normal, pt can be DCed back to [facility]. ): The patient is a 89y Female with pmhx of HTN, PVD, and dementia BIBEMS from [facility] for dehydration and an episode of tachycardia with a heart rate of 115. DDx includes ACS, underlying infection, arrythmia. Ekg, cxr, trop, ctap, u/a, labs. Pt is not tachycardic right now, vitals wnl. If everything comes back normal, pt can be DCed back to [facility]. ZR

## 2021-04-10 NOTE — ED PROVIDER NOTE - PHYSICAL EXAMINATION
Gen: NAD, A&Ox1. Non-toxic appearing  HEENT: Normocephalic and atraumatic. EOMI, no nasal discharge, mucous membranes moist, no scleral icterus.  CV: Regular rate and rhythm, +S1/S2, no M/R/G. No significant lower extremity edema. Radial and DP pulses present and symmetrical. Capillary refill less than 2 seconds.  Resp: Normal effort and rate. CTAB, no rales, rhonchi, or wheezes.  GI: Abdomen soft, non-distended, moderately TTP diffusely. No masses appreciated. Bowel sounds present.  MSK: No open wounds and no bruising  Neuro: Not following commands, not speaking in full sentences, moving extremities spontaneously.  Psych: Appropriate mood, cooperative

## 2021-04-10 NOTE — ED PROVIDER NOTE - OBJECTIVE STATEMENT
89 y old f with history of hypertension .anxiety PVD , send from  [facility] adult home with increase h rate 115 , pt denies any chest pain or SOB , no fever or chills The patient is a 89y Female with pmhx of HTN, PVD, and dementia BIBEMS from [facility] for dehydration and an episode of tachycardia with a heart rate of 115. Pt is poor historian due to baseline dementia. Pt does not know why she is in the hospital. Per EMS report, pt did not have any chest pain, SOB, fever, or chills. Multiple medical allergies.

## 2021-04-10 NOTE — ED ADULT NURSE REASSESSMENT NOTE - NS ED NURSE REASSESS COMMENT FT1
Pt remains in the ED. Resting comfortably in bed. Awake and alert. Breathing spontaneously and unlabored. On cardiac monitor, NSR. Pt straight cathed for urine. Sterile technique maintained. 2 RN present at the bedside. Urine collected and sent to lab. Tolerated well. Awaiting dispo.

## 2021-04-10 NOTE — ED ADULT NURSE NOTE - OBJECTIVE STATEMENT
89y F pmhx of mild dementia baseline mental status unknown by EMS presents to the ED from the [facility] - paperwork states "anxiety and elevated hr 115" however EMS states staff told them the family believes she is dehydrated. 89y F pmhx of mild dementia baseline mental status unknown by EMS presents to the ED from the [facility] - paperwork states  "anxiety and elevated hr 115" however EMS states staff told them the family believes she is dehydrated. Pt whimpering/moaning in bed, when asked if she is in pain or if something was bothering her, pt states "you are bothering me". On assessment, A&Ox2 (disoriented to time). Breathing spontaneously and unlabored on Room air. No Peripheral edema. Cap refill 2s. No JVD. Peripheral pulses strong and equal bilaterally. On cardiac monitor. Abdomen soft, nontender, nondistended, negative CVA tenderness, positive bowel sounds in all four quadrants. Pt has blanchable redness noted to the sacrum. IV placed 20g in R forearm. Labs drawn and sent. Pt safety maintained. Call bell within reach. Side rails in upward position. Pt awaiting dispo. Paperwork placed in chart.

## 2021-04-11 VITALS
OXYGEN SATURATION: 97 % | RESPIRATION RATE: 20 BRPM | DIASTOLIC BLOOD PRESSURE: 105 MMHG | TEMPERATURE: 98 F | SYSTOLIC BLOOD PRESSURE: 168 MMHG | HEART RATE: 93 BPM

## 2021-04-11 LAB
ALBUMIN SERPL ELPH-MCNC: 3.5 G/DL — SIGNIFICANT CHANGE UP (ref 3.3–5)
ALBUMIN SERPL ELPH-MCNC: 4.2 G/DL — SIGNIFICANT CHANGE UP (ref 3.3–5)
ALP SERPL-CCNC: 65 U/L — SIGNIFICANT CHANGE UP (ref 40–120)
ALP SERPL-CCNC: 78 U/L — SIGNIFICANT CHANGE UP (ref 40–120)
ALT FLD-CCNC: 6 U/L — LOW (ref 10–45)
ALT FLD-CCNC: 7 U/L — LOW (ref 10–45)
ANION GAP SERPL CALC-SCNC: 12 MMOL/L — SIGNIFICANT CHANGE UP (ref 5–17)
ANION GAP SERPL CALC-SCNC: 13 MMOL/L — SIGNIFICANT CHANGE UP (ref 5–17)
ANION GAP SERPL CALC-SCNC: 13 MMOL/L — SIGNIFICANT CHANGE UP (ref 5–17)
APPEARANCE UR: CLEAR — SIGNIFICANT CHANGE UP
AST SERPL-CCNC: 14 U/L — SIGNIFICANT CHANGE UP (ref 10–40)
AST SERPL-CCNC: 15 U/L — SIGNIFICANT CHANGE UP (ref 10–40)
BACTERIA # UR AUTO: 0 — SIGNIFICANT CHANGE UP
BILIRUB SERPL-MCNC: 0.3 MG/DL — SIGNIFICANT CHANGE UP (ref 0.2–1.2)
BILIRUB SERPL-MCNC: 0.4 MG/DL — SIGNIFICANT CHANGE UP (ref 0.2–1.2)
BILIRUB UR-MCNC: NEGATIVE — SIGNIFICANT CHANGE UP
BUN SERPL-MCNC: 39 MG/DL — HIGH (ref 7–23)
BUN SERPL-MCNC: 44 MG/DL — HIGH (ref 7–23)
BUN SERPL-MCNC: 55 MG/DL — HIGH (ref 7–23)
CALCIUM SERPL-MCNC: 10.6 MG/DL — HIGH (ref 8.4–10.5)
CALCIUM SERPL-MCNC: 9.3 MG/DL — SIGNIFICANT CHANGE UP (ref 8.4–10.5)
CALCIUM SERPL-MCNC: 9.3 MG/DL — SIGNIFICANT CHANGE UP (ref 8.4–10.5)
CHLORIDE SERPL-SCNC: 102 MMOL/L — SIGNIFICANT CHANGE UP (ref 96–108)
CHLORIDE SERPL-SCNC: 103 MMOL/L — SIGNIFICANT CHANGE UP (ref 96–108)
CHLORIDE SERPL-SCNC: 104 MMOL/L — SIGNIFICANT CHANGE UP (ref 96–108)
CO2 SERPL-SCNC: 23 MMOL/L — SIGNIFICANT CHANGE UP (ref 22–31)
CO2 SERPL-SCNC: 25 MMOL/L — SIGNIFICANT CHANGE UP (ref 22–31)
CO2 SERPL-SCNC: 26 MMOL/L — SIGNIFICANT CHANGE UP (ref 22–31)
COLOR SPEC: YELLOW — SIGNIFICANT CHANGE UP
CREAT SERPL-MCNC: 0.9 MG/DL — SIGNIFICANT CHANGE UP (ref 0.5–1.3)
CREAT SERPL-MCNC: 0.97 MG/DL — SIGNIFICANT CHANGE UP (ref 0.5–1.3)
CREAT SERPL-MCNC: 1.34 MG/DL — HIGH (ref 0.5–1.3)
DIFF PNL FLD: NEGATIVE — SIGNIFICANT CHANGE UP
EPI CELLS # UR: 1 /HPF — SIGNIFICANT CHANGE UP
GLUCOSE SERPL-MCNC: 139 MG/DL — HIGH (ref 70–99)
GLUCOSE SERPL-MCNC: 85 MG/DL — SIGNIFICANT CHANGE UP (ref 70–99)
GLUCOSE SERPL-MCNC: 92 MG/DL — SIGNIFICANT CHANGE UP (ref 70–99)
GLUCOSE UR QL: NEGATIVE — SIGNIFICANT CHANGE UP
HYALINE CASTS # UR AUTO: 4 /LPF — HIGH (ref 0–2)
KETONES UR-MCNC: SIGNIFICANT CHANGE UP
LEUKOCYTE ESTERASE UR-ACNC: NEGATIVE — SIGNIFICANT CHANGE UP
NITRITE UR-MCNC: NEGATIVE — SIGNIFICANT CHANGE UP
PH UR: 5.5 — SIGNIFICANT CHANGE UP (ref 5–8)
POTASSIUM SERPL-MCNC: 4.1 MMOL/L — SIGNIFICANT CHANGE UP (ref 3.5–5.3)
POTASSIUM SERPL-MCNC: 4.4 MMOL/L — SIGNIFICANT CHANGE UP (ref 3.5–5.3)
POTASSIUM SERPL-MCNC: 4.6 MMOL/L — SIGNIFICANT CHANGE UP (ref 3.5–5.3)
POTASSIUM SERPL-SCNC: 4.1 MMOL/L — SIGNIFICANT CHANGE UP (ref 3.5–5.3)
POTASSIUM SERPL-SCNC: 4.4 MMOL/L — SIGNIFICANT CHANGE UP (ref 3.5–5.3)
POTASSIUM SERPL-SCNC: 4.6 MMOL/L — SIGNIFICANT CHANGE UP (ref 3.5–5.3)
PROT SERPL-MCNC: 6.5 G/DL — SIGNIFICANT CHANGE UP (ref 6–8.3)
PROT SERPL-MCNC: 7.8 G/DL — SIGNIFICANT CHANGE UP (ref 6–8.3)
PROT UR-MCNC: ABNORMAL
RBC CASTS # UR COMP ASSIST: 1 /HPF — SIGNIFICANT CHANGE UP (ref 0–4)
SARS-COV-2 RNA SPEC QL NAA+PROBE: SIGNIFICANT CHANGE UP
SODIUM SERPL-SCNC: 140 MMOL/L — SIGNIFICANT CHANGE UP (ref 135–145)
SODIUM SERPL-SCNC: 140 MMOL/L — SIGNIFICANT CHANGE UP (ref 135–145)
SODIUM SERPL-SCNC: 141 MMOL/L — SIGNIFICANT CHANGE UP (ref 135–145)
SP GR SPEC: 1.02 — SIGNIFICANT CHANGE UP (ref 1.01–1.02)
TROPONIN T, HIGH SENSITIVITY RESULT: 28 NG/L — SIGNIFICANT CHANGE UP (ref 0–51)
TROPONIN T, HIGH SENSITIVITY RESULT: 32 NG/L — SIGNIFICANT CHANGE UP (ref 0–51)
TSH SERPL-MCNC: 2.93 UIU/ML — SIGNIFICANT CHANGE UP (ref 0.27–4.2)
UROBILINOGEN FLD QL: NEGATIVE — SIGNIFICANT CHANGE UP
WBC UR QL: 1 /HPF — SIGNIFICANT CHANGE UP (ref 0–5)

## 2021-04-11 PROCEDURE — 85730 THROMBOPLASTIN TIME PARTIAL: CPT

## 2021-04-11 PROCEDURE — 96374 THER/PROPH/DIAG INJ IV PUSH: CPT | Mod: XU

## 2021-04-11 PROCEDURE — 85014 HEMATOCRIT: CPT

## 2021-04-11 PROCEDURE — 84100 ASSAY OF PHOSPHORUS: CPT

## 2021-04-11 PROCEDURE — 84484 ASSAY OF TROPONIN QUANT: CPT

## 2021-04-11 PROCEDURE — 85025 COMPLETE CBC W/AUTO DIFF WBC: CPT

## 2021-04-11 PROCEDURE — 82803 BLOOD GASES ANY COMBINATION: CPT

## 2021-04-11 PROCEDURE — 82947 ASSAY GLUCOSE BLOOD QUANT: CPT

## 2021-04-11 PROCEDURE — U0003: CPT

## 2021-04-11 PROCEDURE — 74177 CT ABD & PELVIS W/CONTRAST: CPT

## 2021-04-11 PROCEDURE — 84443 ASSAY THYROID STIM HORMONE: CPT

## 2021-04-11 PROCEDURE — 74177 CT ABD & PELVIS W/CONTRAST: CPT | Mod: 26,ME

## 2021-04-11 PROCEDURE — 85018 HEMOGLOBIN: CPT

## 2021-04-11 PROCEDURE — 81001 URINALYSIS AUTO W/SCOPE: CPT

## 2021-04-11 PROCEDURE — 82435 ASSAY OF BLOOD CHLORIDE: CPT

## 2021-04-11 PROCEDURE — 83690 ASSAY OF LIPASE: CPT

## 2021-04-11 PROCEDURE — 80048 BASIC METABOLIC PNL TOTAL CA: CPT

## 2021-04-11 PROCEDURE — 80053 COMPREHEN METABOLIC PANEL: CPT

## 2021-04-11 PROCEDURE — 83880 ASSAY OF NATRIURETIC PEPTIDE: CPT

## 2021-04-11 PROCEDURE — G1004: CPT

## 2021-04-11 PROCEDURE — U0005: CPT

## 2021-04-11 PROCEDURE — 85610 PROTHROMBIN TIME: CPT

## 2021-04-11 PROCEDURE — 99284 EMERGENCY DEPT VISIT MOD MDM: CPT | Mod: 25

## 2021-04-11 PROCEDURE — 84132 ASSAY OF SERUM POTASSIUM: CPT

## 2021-04-11 PROCEDURE — 82330 ASSAY OF CALCIUM: CPT

## 2021-04-11 PROCEDURE — 96376 TX/PRO/DX INJ SAME DRUG ADON: CPT

## 2021-04-11 PROCEDURE — 83605 ASSAY OF LACTIC ACID: CPT

## 2021-04-11 PROCEDURE — 71045 X-RAY EXAM CHEST 1 VIEW: CPT

## 2021-04-11 PROCEDURE — 83735 ASSAY OF MAGNESIUM: CPT

## 2021-04-11 PROCEDURE — 84295 ASSAY OF SERUM SODIUM: CPT

## 2021-04-11 PROCEDURE — 87086 URINE CULTURE/COLONY COUNT: CPT

## 2021-04-11 RX ORDER — SODIUM CHLORIDE 9 MG/ML
1000 INJECTION, SOLUTION INTRAVENOUS ONCE
Refills: 0 | Status: COMPLETED | OUTPATIENT
Start: 2021-04-11 | End: 2021-04-11

## 2021-04-11 RX ADMIN — Medication 0.5 MILLIGRAM(S): at 02:44

## 2021-04-11 RX ADMIN — SODIUM CHLORIDE 1000 MILLILITER(S): 9 INJECTION, SOLUTION INTRAVENOUS at 05:59

## 2021-04-11 RX ADMIN — SODIUM CHLORIDE 1000 MILLILITER(S): 9 INJECTION, SOLUTION INTRAVENOUS at 10:03

## 2021-04-11 RX ADMIN — Medication 0.5 MILLIGRAM(S): at 02:00

## 2021-04-11 NOTE — ED ADULT NURSE REASSESSMENT NOTE - NS ED NURSE REASSESS COMMENT FT1
MD Stevens states pt okay to be discharged with BP. Updated report called to KAIA Lantigua @ [facility]. Grzegorz states okay to receive pt at this time.

## 2021-04-11 NOTE — ED ADULT NURSE REASSESSMENT NOTE - NS ED NURSE REASSESS COMMENT FT1
Pt attempted CT with ativan. Pt still not able to complete CT. More Ativan ordered for pt. 0230: Pt attempted CT with ativan. Pt still not able to complete CT. More Ativan ordered for pt.

## 2021-04-11 NOTE — ED ADULT NURSE REASSESSMENT NOTE - NS ED NURSE REASSESS COMMENT FT1
MD states pt okay to be discharged at this time. Pt awaiting non-emergent transportation home. Report given to KAIA Lantigua @ [facility].

## 2021-04-11 NOTE — ED ADULT NURSE REASSESSMENT NOTE - NS ED NURSE REASSESS COMMENT FT1
Pt is awake and alert, resting comfortably in stretcher. Pt placed in room in front of nurses station. Nonslip sock & fall risk bracelet on, bed in lowest position, side rails up. Call bell within reach, comfort & safety provided.

## 2021-04-11 NOTE — ED ADULT NURSE REASSESSMENT NOTE - NS ED NURSE REASSESS COMMENT FT1
Pt attempting to climb out of bed. Pt moved to room in front of nurses station. Red nonslip socks & fall risk bracelet on, bed in lowest position, side rails up, call bell within reach. Comfort & safety provided.

## 2021-04-11 NOTE — ED ADULT NURSE REASSESSMENT NOTE - NS ED NURSE REASSESS COMMENT FT1
received report from KAIA Chavarria. Pt is awake and alert, resting comfortably in stretcher. Pt is A&Ox1, oriented to self. IV fluids running at this time. Pt denies pain, CP, SOB, N/V/D, HA at this time. Call bell within reach, comfort & safety provided.

## 2021-04-12 LAB
CULTURE RESULTS: NO GROWTH — SIGNIFICANT CHANGE UP
SPECIMEN SOURCE: SIGNIFICANT CHANGE UP

## 2021-06-16 NOTE — DIETITIAN INITIAL EVALUATION ADULT. - ADD RECOMMEND
4) Continue to provide micronutrient. 5) Continue to trend labs, weight, skin integrity, and intake.
neg/UCG

## 2021-07-08 NOTE — ED PROVIDER NOTE - ATTENDING CONTRIBUTION TO CARE
Render Risk Assessment In Note?: yes pt is a 89 y/o female Detail Level: Simple Additional Notes: Gave sample of eucerin roughness Patient Management Risk Assessment: Moderate Additional Notes: Recommended amlactin if eucerin doesn’twork pt is a 89 y/o female patient is a resident at the [facility] HTN, CAD with past stent, PAD, reported gout, past undifferentiated dysphagia, with a presumed mechanical fall said the floor was wet. pt with no head injury, no loc c/o chest wall tend, b/l with no signs of injury or crepitance, lungs cta b/l, analgesia, films ordered. pt with skin tear to l forearm with from of elbow, no bony tend, b/l shoulder from with no tenderness. abd soft, nt, from of b/l hips with knee abrasion noted l sided, films ordered.

## 2022-10-18 ENCOUNTER — TRANSCRIPTION ENCOUNTER (OUTPATIENT)
Age: 87
End: 2022-10-18

## 2023-05-09 NOTE — H&P ADULT - ASSESSMENT
History/Exam/Medical Decision Making
89F w/ hx of HTN, CAD s/p BREANNA decades ago?, PAD, gout? p/w fall and persistent pain

## 2023-07-07 NOTE — PATIENT PROFILE ADULT. - PRO INTERPRETER NEED 2
[FreeTextEntry1] : Osteoporosis, post menopausal\par \par DEXA from 11/2021 reviewed: OP\par \par Unable to tolerate oral bisphosphonates 2nd GERD\par \par - plan to get Prolia today\par - repeat DEXA 11/2023\par - calcium and vitamin d supplementation. \par - regular exercise: aerobic and resistance\par - fall prevention\par - limitation of alcohol and caffeine intake\par - encouraged compliance with medications and f/u\par \par Discussed treatment plan with the patient and her friend. The patient was given the opportunity to ask questions and all questions were answered to their satisfaction. English

## 2024-01-01 NOTE — DIETITIAN INITIAL EVALUATION ADULT. - OBTAIN WEEKLY WEIGHT
symptoms   Assess for jaundice risk and/or signs and symptoms     Problem: Discharge Planning  Goal: Discharge to home or other facility with appropriate resources  Outcome: Progressing  Flowsheets (Taken 2024 3053)  Discharge to home or other facility with appropriate resources: Identify barriers to discharge with patient and caregiver      yes

## 2025-02-24 NOTE — PHYSICAL THERAPY INITIAL EVALUATION ADULT - BED MOBILITY TRAINING, PT EVAL
Health Maintenance Due   Topic Date Due    Depression Screen  Never done    HIV screen  Never done    Hepatitis C screen  Never done    Hepatitis B vaccine (1 of 3 - 19+ 3-dose series) Never done    DTaP/Tdap/Td vaccine (1 - Tdap) 01/02/2008    Cervical cancer screen  Never done    Lipids  Never done    Breast cancer screen  Never done    Colorectal Cancer Screen  Never done    Flu vaccine (1) Never done    COVID-19 Vaccine (1 - 2024-25 season) Never done      
Behavior normal.         Thought Content: Thought content normal.         Judgment: Judgment normal.               Immunization History   Administered Date(s) Administered    Td, unspecified formulation 01/01/2008         Health Maintenance   Topic Date Due    Depression Screen  Never done    HIV screen  Never done    Hepatitis C screen  Never done    Hepatitis B vaccine (1 of 3 - 19+ 3-dose series) Never done    DTaP/Tdap/Td vaccine (1 - Tdap) 01/02/2008    Cervical cancer screen  Never done    Lipids  Never done    Breast cancer screen  Never done    Colorectal Cancer Screen  Never done    Flu vaccine (1) Never done    COVID-19 Vaccine (1 - 2024-25 season) Never done    Hepatitis A vaccine  Aged Out    Hib vaccine  Aged Out    HPV vaccine  Aged Out    Polio vaccine  Aged Out    Meningococcal (ACWY) vaccine  Aged Out    Pneumococcal 0-49 years Vaccine  Aged Out         ASSESSMENT       Diagnosis Orders   1. Bronchitis  promethazine-dextromethorphan (PROMETHAZINE-DM) 6.25-15 MG/5ML syrup    amoxicillin-clavulanate (AUGMENTIN) 875-125 MG per tablet    XR CHEST (2 VW)    CBC with Auto Differential      2. Rhonchi  amoxicillin-clavulanate (AUGMENTIN) 875-125 MG per tablet    XR CHEST (2 VW)    CBC with Auto Differential      3. Screening, lipid  Lipid Panel      4. Screening for diabetes mellitus  Comprehensive Metabolic Panel      5. Screening mammogram for breast cancer  [facility] DIGITAL SCREEN BILATERAL              PLAN       Requested Prescriptions     Signed Prescriptions Disp Refills    promethazine-dextromethorphan (PROMETHAZINE-DM) 6.25-15 MG/5ML syrup 118 mL 0     Sig: Take 5 mLs by mouth 4 times daily as needed for Cough    amoxicillin-clavulanate (AUGMENTIN) 875-125 MG per tablet 20 tablet 0     Sig: Take 1 tablet by mouth 2 times daily for 10 days       Orders Placed This Encounter   Procedures    XR CHEST (2 VW)     Standing Status:   Future     Standing Expiration Date:   2/24/2026     Order Specific 
GOAL: pt will perform all aspects of bed mobility CGA in 2 weeks